# Patient Record
Sex: FEMALE | Race: WHITE | Employment: UNEMPLOYED | URBAN - METROPOLITAN AREA
[De-identification: names, ages, dates, MRNs, and addresses within clinical notes are randomized per-mention and may not be internally consistent; named-entity substitution may affect disease eponyms.]

---

## 2017-02-18 LAB
HEPATITIS B SURFACE ANTIGEN INTERPRETATION: NORMAL
HEPATITIS C ANTIBODY INTERPRETATION: NORMAL
RPR: NORMAL

## 2017-02-20 LAB — HIV-1 AND HIV-2 ANTIBODIES: NORMAL

## 2017-05-12 ENCOUNTER — OFFICE VISIT (OUTPATIENT)
Dept: FAMILY MEDICINE CLINIC | Age: 27
End: 2017-05-12

## 2017-05-12 VITALS
SYSTOLIC BLOOD PRESSURE: 112 MMHG | HEART RATE: 72 BPM | WEIGHT: 166 LBS | RESPIRATION RATE: 18 BRPM | DIASTOLIC BLOOD PRESSURE: 66 MMHG | TEMPERATURE: 98 F | BODY MASS INDEX: 28.34 KG/M2 | HEIGHT: 64 IN

## 2017-05-12 DIAGNOSIS — K21.9 GASTROESOPHAGEAL REFLUX DISEASE WITHOUT ESOPHAGITIS: ICD-10-CM

## 2017-05-12 DIAGNOSIS — R13.19 OTHER DYSPHAGIA: ICD-10-CM

## 2017-05-12 DIAGNOSIS — F32.89 ATYPICAL DEPRESSION: Primary | ICD-10-CM

## 2017-05-12 PROCEDURE — 99214 OFFICE O/P EST MOD 30 MIN: CPT | Performed by: FAMILY MEDICINE

## 2017-05-12 RX ORDER — QUETIAPINE 200 MG/1
200 TABLET, FILM COATED, EXTENDED RELEASE ORAL DAILY
Qty: 30 TABLET | Refills: 0 | Status: SHIPPED | OUTPATIENT
Start: 2017-05-12 | End: 2017-06-16

## 2017-06-16 DIAGNOSIS — F32.89 ATYPICAL DEPRESSION: ICD-10-CM

## 2017-06-16 RX ORDER — QUETIAPINE 150 MG/1
150 TABLET, FILM COATED, EXTENDED RELEASE ORAL DAILY
Qty: 30 TABLET | Refills: 5 | Status: SHIPPED | OUTPATIENT
Start: 2017-06-16 | End: 2017-09-15 | Stop reason: SDUPTHER

## 2017-06-16 RX ORDER — OMEPRAZOLE 20 MG/1
20 CAPSULE, DELAYED RELEASE ORAL DAILY
Qty: 30 CAPSULE | Refills: 5 | Status: SHIPPED | OUTPATIENT
Start: 2017-06-16 | End: 2018-01-19 | Stop reason: SDUPTHER

## 2017-09-15 ENCOUNTER — OFFICE VISIT (OUTPATIENT)
Dept: FAMILY MEDICINE CLINIC | Age: 27
End: 2017-09-15

## 2017-09-15 VITALS
HEART RATE: 90 BPM | RESPIRATION RATE: 16 BRPM | SYSTOLIC BLOOD PRESSURE: 114 MMHG | HEIGHT: 64 IN | BODY MASS INDEX: 29.53 KG/M2 | TEMPERATURE: 98.6 F | DIASTOLIC BLOOD PRESSURE: 76 MMHG | WEIGHT: 173 LBS

## 2017-09-15 DIAGNOSIS — F32.89 ATYPICAL DEPRESSION: ICD-10-CM

## 2017-09-15 DIAGNOSIS — R73.9 HYPERGLYCEMIA: ICD-10-CM

## 2017-09-15 DIAGNOSIS — R53.83 MALAISE AND FATIGUE: ICD-10-CM

## 2017-09-15 DIAGNOSIS — B36.0 TINEA VERSICOLOR: ICD-10-CM

## 2017-09-15 DIAGNOSIS — F32.A DEPRESSION, UNSPECIFIED DEPRESSION TYPE: ICD-10-CM

## 2017-09-15 DIAGNOSIS — R53.81 MALAISE AND FATIGUE: ICD-10-CM

## 2017-09-15 DIAGNOSIS — G47.10 HYPERSOMNIA: Primary | ICD-10-CM

## 2017-09-15 LAB
BASOPHILS ABSOLUTE: 0.1 K/UL (ref 0–0.2)
BASOPHILS RELATIVE PERCENT: 0.7 %
EOSINOPHILS ABSOLUTE: 0.2 K/UL (ref 0–0.6)
EOSINOPHILS RELATIVE PERCENT: 3 %
HCT VFR BLD CALC: 44.4 % (ref 36–48)
HEMOGLOBIN: 15 G/DL (ref 12–16)
LYMPHOCYTES ABSOLUTE: 2.2 K/UL (ref 1–5.1)
LYMPHOCYTES RELATIVE PERCENT: 32.6 %
MCH RBC QN AUTO: 30.1 PG (ref 26–34)
MCHC RBC AUTO-ENTMCNC: 33.9 G/DL (ref 31–36)
MCV RBC AUTO: 88.9 FL (ref 80–100)
MONOCYTES ABSOLUTE: 0.5 K/UL (ref 0–1.3)
MONOCYTES RELATIVE PERCENT: 7.6 %
NEUTROPHILS ABSOLUTE: 3.8 K/UL (ref 1.7–7.7)
NEUTROPHILS RELATIVE PERCENT: 56.1 %
PDW BLD-RTO: 13.7 % (ref 12.4–15.4)
PLATELET # BLD: 231 K/UL (ref 135–450)
PMV BLD AUTO: 9.5 FL (ref 5–10.5)
RBC # BLD: 4.99 M/UL (ref 4–5.2)
TSH SERPL DL<=0.05 MIU/L-ACNC: 7.2 UIU/ML (ref 0.27–4.2)
WBC # BLD: 6.8 K/UL (ref 4–11)

## 2017-09-15 PROCEDURE — 99214 OFFICE O/P EST MOD 30 MIN: CPT | Performed by: FAMILY MEDICINE

## 2017-09-15 RX ORDER — KETOCONAZOLE 20 MG/ML
SHAMPOO TOPICAL
Qty: 2 BOTTLE | Refills: 3 | Status: ON HOLD | OUTPATIENT
Start: 2017-09-15 | End: 2018-04-05 | Stop reason: ALTCHOICE

## 2017-09-15 RX ORDER — QUETIAPINE 150 MG/1
150 TABLET, FILM COATED, EXTENDED RELEASE ORAL DAILY
Qty: 30 TABLET | Refills: 5 | Status: SHIPPED | OUTPATIENT
Start: 2017-09-15 | End: 2018-01-19 | Stop reason: SDUPTHER

## 2017-09-15 ASSESSMENT — PATIENT HEALTH QUESTIONNAIRE - PHQ9
SUM OF ALL RESPONSES TO PHQ QUESTIONS 1-9: 0
1. LITTLE INTEREST OR PLEASURE IN DOING THINGS: 0
SUM OF ALL RESPONSES TO PHQ9 QUESTIONS 1 & 2: 0
2. FEELING DOWN, DEPRESSED OR HOPELESS: 0

## 2017-09-16 LAB
ESTIMATED AVERAGE GLUCOSE: 108.3 MG/DL
HBA1C MFR BLD: 5.4 %

## 2017-09-18 DIAGNOSIS — E03.9 ACQUIRED HYPOTHYROIDISM: Primary | ICD-10-CM

## 2017-09-18 RX ORDER — LEVOTHYROXINE SODIUM 0.05 MG/1
50 TABLET ORAL DAILY
Qty: 30 TABLET | Refills: 3 | Status: SHIPPED | OUTPATIENT
Start: 2017-09-18 | End: 2018-01-19 | Stop reason: SDUPTHER

## 2017-12-07 LAB
ABO/RH: NORMAL
ANTIBODY SCREEN: NORMAL
HEPATITIS C ANTIBODY INTERPRETATION: NORMAL

## 2017-12-08 LAB
BASOPHILS ABSOLUTE: 0 K/UL (ref 0–0.2)
BASOPHILS RELATIVE PERCENT: 0.5 %
EOSINOPHILS ABSOLUTE: 0.2 K/UL (ref 0–0.6)
EOSINOPHILS RELATIVE PERCENT: 1.7 %
HCT VFR BLD CALC: 41.3 % (ref 36–48)
HEMOGLOBIN: 13.5 G/DL (ref 12–16)
HEPATITIS B SURFACE ANTIGEN INTERPRETATION: NORMAL
HIV-1 AND HIV-2 ANTIBODIES: NORMAL
LYMPHOCYTES ABSOLUTE: 2 K/UL (ref 1–5.1)
LYMPHOCYTES RELATIVE PERCENT: 22.7 %
MCH RBC QN AUTO: 29.3 PG (ref 26–34)
MCHC RBC AUTO-ENTMCNC: 32.6 G/DL (ref 31–36)
MCV RBC AUTO: 89.9 FL (ref 80–100)
MONOCYTES ABSOLUTE: 0.8 K/UL (ref 0–1.3)
MONOCYTES RELATIVE PERCENT: 8.8 %
NEUTROPHILS ABSOLUTE: 5.8 K/UL (ref 1.7–7.7)
NEUTROPHILS RELATIVE PERCENT: 66.3 %
PDW BLD-RTO: 13.4 % (ref 12.4–15.4)
PLATELET # BLD: 258 K/UL (ref 135–450)
PMV BLD AUTO: 9.2 FL (ref 5–10.5)
RBC # BLD: 4.59 M/UL (ref 4–5.2)
RPR: NORMAL
RUBELLA ANTIBODY IGG: 107.3 IU/ML
WBC # BLD: 8.8 K/UL (ref 4–11)

## 2018-01-19 ENCOUNTER — OFFICE VISIT (OUTPATIENT)
Dept: FAMILY MEDICINE CLINIC | Age: 28
End: 2018-01-19

## 2018-01-19 VITALS
WEIGHT: 180 LBS | RESPIRATION RATE: 16 BRPM | SYSTOLIC BLOOD PRESSURE: 116 MMHG | TEMPERATURE: 98.3 F | HEART RATE: 104 BPM | BODY MASS INDEX: 28.93 KG/M2 | HEIGHT: 66 IN | DIASTOLIC BLOOD PRESSURE: 80 MMHG

## 2018-01-19 DIAGNOSIS — F32.89 ATYPICAL DEPRESSION: ICD-10-CM

## 2018-01-19 DIAGNOSIS — E03.9 ACQUIRED HYPOTHYROIDISM: ICD-10-CM

## 2018-01-19 DIAGNOSIS — Z33.1 PREGNANCY, INCIDENTAL: Primary | ICD-10-CM

## 2018-01-19 LAB
T4 FREE: 0.9 NG/DL (ref 0.9–1.8)
TSH SERPL DL<=0.05 MIU/L-ACNC: 0.48 UIU/ML (ref 0.27–4.2)

## 2018-01-19 PROCEDURE — G8419 CALC BMI OUT NRM PARAM NOF/U: HCPCS | Performed by: FAMILY MEDICINE

## 2018-01-19 PROCEDURE — 99214 OFFICE O/P EST MOD 30 MIN: CPT | Performed by: FAMILY MEDICINE

## 2018-01-19 PROCEDURE — 1036F TOBACCO NON-USER: CPT | Performed by: FAMILY MEDICINE

## 2018-01-19 PROCEDURE — G8482 FLU IMMUNIZE ORDER/ADMIN: HCPCS | Performed by: FAMILY MEDICINE

## 2018-01-19 PROCEDURE — G8427 DOCREV CUR MEDS BY ELIG CLIN: HCPCS | Performed by: FAMILY MEDICINE

## 2018-01-19 RX ORDER — LEVOTHYROXINE SODIUM 0.05 MG/1
50 TABLET ORAL DAILY
Qty: 90 TABLET | Refills: 1 | Status: SHIPPED | OUTPATIENT
Start: 2018-01-19 | End: 2018-07-25 | Stop reason: SDUPTHER

## 2018-01-19 RX ORDER — QUETIAPINE 150 MG/1
150 TABLET, FILM COATED, EXTENDED RELEASE ORAL DAILY
Qty: 30 TABLET | Refills: 5 | Status: SHIPPED | OUTPATIENT
Start: 2018-01-19 | End: 2018-05-02

## 2018-01-19 RX ORDER — OMEPRAZOLE 20 MG/1
20 CAPSULE, DELAYED RELEASE ORAL DAILY
Qty: 30 CAPSULE | Refills: 5 | Status: SHIPPED | OUTPATIENT
Start: 2018-01-19 | End: 2018-05-02

## 2018-01-19 NOTE — PROGRESS NOTES
Subjective:     Chief Complaint   Patient presents with    Check-Up     4 month check up       Glenn Salazar is a 32 y.o. female who presents for follow-up of thyroid, 10 weeks EGA, vomiting getting better, been to ER with dehydration    Review of systems:  Patient denies, chest pain, shortness of breath or significant leg swelling. Patient's medications, allergies, past medical, surgical, social and family histories were reviewed and updated as appropriate. Current Outpatient Prescriptions on File Prior to Visit   Medication Sig Dispense Refill    acetaminophen (APAP EXTRA STRENGTH) 500 MG tablet Take 2 tablets by mouth every 6 hours as needed for Pain DO NOT TAKE WITH OTHER MEDICATIONS CONTAINING ACETAMINOPHEN. 30 tablet 0    levothyroxine (SYNTHROID) 50 MCG tablet Take 1 tablet by mouth Daily 30 tablet 3    ketoconazole (NIZORAL) 2 % shampoo Apply to body, leave on about 15 minutes. May repeat in every 2 weeks. 2 Bottle 3    QUEtiapine (SEROQUEL XR) 150 MG TB24 extended release tablet Take 1 tablet by mouth daily 30 tablet 5    omeprazole (PRILOSEC) 20 MG delayed release capsule Take 1 capsule by mouth daily 30 capsule 5    albuterol sulfate HFA (VENTOLIN HFA) 108 (90 BASE) MCG/ACT inhaler Inhale 2 puffs into the lungs daily as needed for Wheezing or Shortness of Breath 1 Inhaler 3     No current facility-administered medications on file prior to visit. Allergies   Allergen Reactions    Latex Rash    Fluoxetine Nausea Only    Percocet [Oxycodone-Acetaminophen] Nausea Only    Tramadol Other (See Comments)     Hallucinations.  Wellbutrin [Bupropion Hcl] Nausea Only    Zoloft [Sertraline Hcl]      overstim    Shellfish-Derived Products Swelling and Rash     Past Medical History:   Diagnosis Date    Abnormal Pap smear     Colposcopy; HPV    ADHD (attention deficit hyperactivity disorder)     no meds currently.     Anxiety     Currently taking Fluoxitine    Asthma     Daily asthma attacks recently w/pregnancy;  uses Albuterol inhaler QID with pregnancy.  Back pain, chronic     Diabetes mellitus (HCC)     GDM on glyburide    Endometriosis     Febrile convulsions (simple), unspecified     Febrile seizure as an infant one time    Generalized anxiety disorder     was taking prozac early in pregnancy.  History of chicken pox 87    Irritable bowel     Positive PPD, treated     negative chest xray.     Pre-eclampsia in third trimester 2012     Past Surgical History:   Procedure Laterality Date    BREAST SURGERY      reduction     SECTION       Family History   Problem Relation Age of Onset    Mental Illness Father      bipolar    Heart Disease Father 43     MI x 3    Cancer Sister      cervical    Heart Failure Maternal Grandmother     Asthma Maternal Grandmother      Health Maintenance   Topic Date Due    TSH testing  09/15/2018    Cervical cancer screen  2020    DTaP/Tdap/Td vaccine (7 - Td) 12/10/2022    Flu vaccine  Completed    Pneumococcal med risk  Completed    HIV screen  Completed     TSH (uIU/mL)   Date Value   09/15/2017 7.20 (H)     Cholesterol, Total   Date Value Ref Range Status   2015 148 0 - 199 mg/dL Final     LDL Calculated   Date Value Ref Range Status   2015 93 <100 mg/dL Final     HDL   Date Value Ref Range Status   2015 41 40 - 60 mg/dL Final     Triglycerides   Date Value Ref Range Status   2015 69 0 - 150 mg/dL Final     Lab Results   Component Value Date    GLUCOSE 95 2018     Lab Results   Component Value Date     2018    K 3.8 2018    CREATININE <0.5 (L) 2018     Lab Results   Component Value Date    WBC 12.0 (H) 2018    HGB 13.3 2018    HCT 39.3 2018    MCV 86.5 2018     2018     Lab Results   Component Value Date    ALT 21 2018    AST 14 (L) 2018    ALKPHOS 56 2018    BILITOT 0.4 2018     Lab Results

## 2018-01-30 ENCOUNTER — HOSPITAL ENCOUNTER (OUTPATIENT)
Dept: OTHER | Age: 28
Discharge: HOME | End: 2018-01-30
Attending: OBSTETRICS & GYNECOLOGY | Admitting: OBSTETRICS & GYNECOLOGY

## 2018-01-30 LAB
A/G RATIO: 1.4 (ref 1.1–2.2)
ALBUMIN SERPL-MCNC: 3.8 G/DL (ref 3.4–5)
ALP BLD-CCNC: 47 U/L (ref 40–129)
ALT SERPL-CCNC: 11 U/L (ref 10–40)
ANION GAP SERPL CALCULATED.3IONS-SCNC: 13 MMOL/L (ref 3–16)
AST SERPL-CCNC: 13 U/L (ref 15–37)
BILIRUB SERPL-MCNC: <0.2 MG/DL (ref 0–1)
BUN BLDV-MCNC: 7 MG/DL (ref 7–20)
CALCIUM SERPL-MCNC: 8.7 MG/DL (ref 8.3–10.6)
CHLORIDE BLD-SCNC: 97 MMOL/L (ref 99–110)
CO2: 24 MMOL/L (ref 21–32)
CREAT SERPL-MCNC: <0.5 MG/DL (ref 0.6–1.1)
GFR AFRICAN AMERICAN: >60
GFR NON-AFRICAN AMERICAN: >60
GLOBULIN: 2.8 G/DL
GLUCOSE BLD-MCNC: 188 MG/DL (ref 70–99)
POTASSIUM SERPL-SCNC: 3.8 MMOL/L (ref 3.5–5.1)
PROTEIN 24 HOUR URINE: 0.13 G/24HR
SODIUM BLD-SCNC: 134 MMOL/L (ref 136–145)
TOTAL PROTEIN: 6.6 G/DL (ref 6.4–8.2)
URIC ACID, SERUM: 2.7 MG/DL (ref 2.6–6)

## 2018-01-31 LAB
24HR URINE VOLUME (ML): 2175 ML
CREAT SERPL-MCNC: <0.5 MG/DL (ref 0.6–1.2)
CREATININE 24 HOUR URINE: 1.5 G/24HR (ref 0.6–1.5)
CREATININE CLEARANCE: 187 ML/MIN (ref 88–128)
Lab: 24 HR

## 2018-02-23 ENCOUNTER — TELEPHONE (OUTPATIENT)
Dept: FAMILY MEDICINE CLINIC | Age: 28
End: 2018-02-23

## 2018-02-23 NOTE — TELEPHONE ENCOUNTER
Pt called and wanted to know how long the whooping cough vaccine is good for. She is currently pregnant and her ob said they are good for life and she also heard they wear good for 5 years and she just wants to know so if her family members need them they can get one.

## 2018-03-05 ENCOUNTER — HOSPITAL ENCOUNTER (OUTPATIENT)
Dept: OTHER | Age: 28
Discharge: HOME | End: 2018-03-05
Attending: OBSTETRICS & GYNECOLOGY | Admitting: OBSTETRICS & GYNECOLOGY

## 2018-03-05 LAB
GLUCOSE FASTING: 98 MG/DL
GLUCOSE TOLERANCE TEST 1 HOUR: 176 MG/DL
GLUCOSE TOLERANCE TEST 2 HOUR: 163 MG/DL
GLUCOSE TOLERANCE TEST 3 HOUR: 103 MG/DL

## 2018-03-27 ENCOUNTER — HOSPITAL ENCOUNTER (OUTPATIENT)
Dept: DIABETES SERVICES | Age: 28
Discharge: HOME | End: 2018-03-27
Attending: OBSTETRICS & GYNECOLOGY | Admitting: OBSTETRICS & GYNECOLOGY

## 2018-03-27 DIAGNOSIS — O99.810 ABNORMAL GLUCOSE COMPLICATING PREGNANCY: ICD-10-CM

## 2018-03-27 ASSESSMENT — PATIENT HEALTH QUESTIONNAIRE - PHQ9
SUM OF ALL RESPONSES TO PHQ9 QUESTIONS 1 & 2: 0
SUM OF ALL RESPONSES TO PHQ QUESTIONS 1-9: 0
1. LITTLE INTEREST OR PLEASURE IN DOING THINGS: 0

## 2018-04-05 PROBLEM — M54.9 BACK PAIN: Status: ACTIVE | Noted: 2018-04-05

## 2018-04-17 LAB
A/G RATIO: 1.6 (ref 1.1–2.2)
ALBUMIN SERPL-MCNC: 3.6 G/DL (ref 3.4–5)
ALP BLD-CCNC: 62 U/L (ref 40–129)
ALT SERPL-CCNC: 23 U/L (ref 10–40)
AMYLASE: 43 U/L (ref 25–115)
ANION GAP SERPL CALCULATED.3IONS-SCNC: 15 MMOL/L (ref 3–16)
AST SERPL-CCNC: 16 U/L (ref 15–37)
BILIRUB SERPL-MCNC: <0.2 MG/DL (ref 0–1)
BUN BLDV-MCNC: 7 MG/DL (ref 7–20)
CALCIUM SERPL-MCNC: 8.5 MG/DL (ref 8.3–10.6)
CHLORIDE BLD-SCNC: 101 MMOL/L (ref 99–110)
CO2: 23 MMOL/L (ref 21–32)
CREAT SERPL-MCNC: <0.5 MG/DL (ref 0.6–1.1)
GFR AFRICAN AMERICAN: >60
GFR NON-AFRICAN AMERICAN: >60
GLOBULIN: 2.3 G/DL
GLUCOSE BLD-MCNC: 136 MG/DL (ref 70–99)
LIPASE: 28 U/L (ref 13–60)
POTASSIUM SERPL-SCNC: 4.2 MMOL/L (ref 3.5–5.1)
SODIUM BLD-SCNC: 139 MMOL/L (ref 136–145)
TOTAL PROTEIN: 5.9 G/DL (ref 6.4–8.2)

## 2018-04-23 ENCOUNTER — HOSPITAL ENCOUNTER (OUTPATIENT)
Dept: ULTRASOUND IMAGING | Age: 28
Discharge: HOME | End: 2018-04-23
Attending: OBSTETRICS & GYNECOLOGY | Admitting: OBSTETRICS & GYNECOLOGY

## 2018-04-23 DIAGNOSIS — R10.11 RIGHT UPPER QUADRANT PAIN: ICD-10-CM

## 2018-05-02 ENCOUNTER — OFFICE VISIT (OUTPATIENT)
Dept: FAMILY MEDICINE CLINIC | Age: 28
End: 2018-05-02

## 2018-05-02 VITALS
HEART RATE: 106 BPM | WEIGHT: 203 LBS | HEIGHT: 66 IN | SYSTOLIC BLOOD PRESSURE: 116 MMHG | DIASTOLIC BLOOD PRESSURE: 78 MMHG | BODY MASS INDEX: 32.62 KG/M2 | RESPIRATION RATE: 16 BRPM | TEMPERATURE: 98.7 F

## 2018-05-02 DIAGNOSIS — E03.9 ACQUIRED HYPOTHYROIDISM: Primary | ICD-10-CM

## 2018-05-02 DIAGNOSIS — E03.9 ACQUIRED HYPOTHYROIDISM: ICD-10-CM

## 2018-05-02 DIAGNOSIS — J45.40 MODERATE PERSISTENT ASTHMA WITHOUT COMPLICATION: ICD-10-CM

## 2018-05-02 DIAGNOSIS — F32.89 ATYPICAL DEPRESSION: ICD-10-CM

## 2018-05-02 DIAGNOSIS — Z33.1 PREGNANCY, INCIDENTAL: ICD-10-CM

## 2018-05-02 LAB
T3 TOTAL: 1.82 NG/ML (ref 0.8–2)
T4 FREE: 1 NG/DL (ref 0.9–1.8)
TSH SERPL DL<=0.05 MIU/L-ACNC: 0.72 UIU/ML (ref 0.27–4.2)

## 2018-05-02 PROCEDURE — G8417 CALC BMI ABV UP PARAM F/U: HCPCS | Performed by: FAMILY MEDICINE

## 2018-05-02 PROCEDURE — 99214 OFFICE O/P EST MOD 30 MIN: CPT | Performed by: FAMILY MEDICINE

## 2018-05-02 PROCEDURE — 1036F TOBACCO NON-USER: CPT | Performed by: FAMILY MEDICINE

## 2018-05-02 PROCEDURE — G8427 DOCREV CUR MEDS BY ELIG CLIN: HCPCS | Performed by: FAMILY MEDICINE

## 2018-05-02 RX ORDER — QUETIAPINE FUMARATE 50 MG/1
TABLET, EXTENDED RELEASE ORAL
Qty: 21 TABLET | Refills: 0 | Status: SHIPPED | OUTPATIENT
Start: 2018-05-02 | End: 2018-06-13

## 2018-05-02 RX ORDER — NEBULIZER ACCESSORIES
1 KIT MISCELLANEOUS DAILY PRN
Qty: 1 KIT | Refills: 0 | Status: SHIPPED | OUTPATIENT
Start: 2018-05-02 | End: 2018-12-23

## 2018-05-03 ENCOUNTER — TELEPHONE (OUTPATIENT)
Dept: FAMILY MEDICINE CLINIC | Age: 28
End: 2018-05-03

## 2018-05-03 DIAGNOSIS — J45.20 ASTHMA, MILD INTERMITTENT, WELL-CONTROLLED: Primary | ICD-10-CM

## 2018-05-04 RX ORDER — ALBUTEROL SULFATE 2.5 MG/3ML
2.5 SOLUTION RESPIRATORY (INHALATION) EVERY 6 HOURS PRN
Qty: 50 VIAL | Refills: 3 | Status: SHIPPED | OUTPATIENT
Start: 2018-05-04 | End: 2018-12-23 | Stop reason: ALTCHOICE

## 2018-06-09 PROBLEM — R10.9 ABDOMINAL PAIN: Status: ACTIVE | Noted: 2018-06-09

## 2018-06-13 ENCOUNTER — OFFICE VISIT (OUTPATIENT)
Dept: FAMILY MEDICINE CLINIC | Age: 28
End: 2018-06-13

## 2018-06-13 VITALS
OXYGEN SATURATION: 98 % | SYSTOLIC BLOOD PRESSURE: 116 MMHG | WEIGHT: 204 LBS | RESPIRATION RATE: 16 BRPM | HEART RATE: 104 BPM | HEIGHT: 66 IN | DIASTOLIC BLOOD PRESSURE: 74 MMHG | BODY MASS INDEX: 32.78 KG/M2

## 2018-06-13 DIAGNOSIS — O24.414 INSULIN CONTROLLED GESTATIONAL DIABETES MELLITUS (GDM) IN SECOND TRIMESTER: ICD-10-CM

## 2018-06-13 DIAGNOSIS — F32.A DEPRESSION, UNSPECIFIED DEPRESSION TYPE: Primary | ICD-10-CM

## 2018-06-13 DIAGNOSIS — Z33.1 PREGNANCY, INCIDENTAL: ICD-10-CM

## 2018-06-13 DIAGNOSIS — F51.01 PRIMARY INSOMNIA: ICD-10-CM

## 2018-06-13 PROCEDURE — 1036F TOBACCO NON-USER: CPT | Performed by: FAMILY MEDICINE

## 2018-06-13 PROCEDURE — 99213 OFFICE O/P EST LOW 20 MIN: CPT | Performed by: FAMILY MEDICINE

## 2018-06-13 PROCEDURE — G8427 DOCREV CUR MEDS BY ELIG CLIN: HCPCS | Performed by: FAMILY MEDICINE

## 2018-06-13 PROCEDURE — G8417 CALC BMI ABV UP PARAM F/U: HCPCS | Performed by: FAMILY MEDICINE

## 2018-06-13 RX ORDER — INSULIN HUMAN 100 [IU]/ML
INJECTION, SUSPENSION SUBCUTANEOUS
COMMUNITY
Start: 2018-05-16 | End: 2018-10-10

## 2018-06-13 RX ORDER — BLOOD-GLUCOSE METER
KIT MISCELLANEOUS
COMMUNITY
Start: 2018-06-06 | End: 2018-10-10

## 2018-06-13 RX ORDER — INSULIN LISPRO 100 [IU]/ML
INJECTION, SOLUTION INTRAVENOUS; SUBCUTANEOUS
COMMUNITY
Start: 2018-05-16 | End: 2018-10-10

## 2018-06-29 DIAGNOSIS — J45.20 ASTHMA, MILD INTERMITTENT, WELL-CONTROLLED: ICD-10-CM

## 2018-06-29 RX ORDER — ALBUTEROL SULFATE 90 UG/1
2 AEROSOL, METERED RESPIRATORY (INHALATION) DAILY PRN
Qty: 1 INHALER | Refills: 3 | Status: SHIPPED | OUTPATIENT
Start: 2018-06-29 | End: 2019-04-18

## 2018-07-05 LAB — TSH SERPL DL<=0.05 MIU/L-ACNC: 0.91 UIU/ML (ref 0.27–4.2)

## 2018-07-16 ENCOUNTER — OFFICE VISIT (OUTPATIENT)
Dept: FAMILY MEDICINE CLINIC | Age: 28
End: 2018-07-16

## 2018-07-16 VITALS
SYSTOLIC BLOOD PRESSURE: 122 MMHG | DIASTOLIC BLOOD PRESSURE: 84 MMHG | OXYGEN SATURATION: 99 % | RESPIRATION RATE: 18 BRPM | BODY MASS INDEX: 34.39 KG/M2 | HEIGHT: 66 IN | WEIGHT: 214 LBS | HEART RATE: 116 BPM | TEMPERATURE: 99.1 F

## 2018-07-16 DIAGNOSIS — O24.414 INSULIN CONTROLLED GESTATIONAL DIABETES MELLITUS (GDM) IN SECOND TRIMESTER: ICD-10-CM

## 2018-07-16 DIAGNOSIS — Z23 NEED FOR TETANUS BOOSTER: ICD-10-CM

## 2018-07-16 DIAGNOSIS — F32.A DEPRESSION, UNSPECIFIED DEPRESSION TYPE: Primary | ICD-10-CM

## 2018-07-16 DIAGNOSIS — F41.9 ANXIETY: ICD-10-CM

## 2018-07-16 DIAGNOSIS — J45.40 MODERATE PERSISTENT ASTHMA WITHOUT COMPLICATION: ICD-10-CM

## 2018-07-16 PROBLEM — M54.9 BACK PAIN: Status: RESOLVED | Noted: 2018-04-05 | Resolved: 2018-07-16

## 2018-07-16 PROBLEM — R10.9 ABDOMINAL PAIN: Status: RESOLVED | Noted: 2018-06-09 | Resolved: 2018-07-16

## 2018-07-16 PROCEDURE — 99214 OFFICE O/P EST MOD 30 MIN: CPT | Performed by: FAMILY MEDICINE

## 2018-07-16 PROCEDURE — G8417 CALC BMI ABV UP PARAM F/U: HCPCS | Performed by: FAMILY MEDICINE

## 2018-07-16 PROCEDURE — 1036F TOBACCO NON-USER: CPT | Performed by: FAMILY MEDICINE

## 2018-07-16 PROCEDURE — G8427 DOCREV CUR MEDS BY ELIG CLIN: HCPCS | Performed by: FAMILY MEDICINE

## 2018-07-16 NOTE — PROGRESS NOTES
Scribe documentation   I, Fabrizio Howell , am scribing for Chely York MD. Electronically signed by Fabrizio Howell  on 7/16/2018 at 1:45 PM    CHART REVIEW  Health Maintenance   Topic Date Due    Flu vaccine (1) 09/01/2018    TSH testing  07/05/2019    Cervical cancer screen  02/17/2020    DTaP/Tdap/Td vaccine (7 - Td) 12/10/2022    Pneumococcal med risk  Completed    HIV screen  Completed     Social History     Social History    Marital status: Single     Spouse name: N/A    Number of children: N/A    Years of education: N/A     Social History Main Topics    Smoking status: Never Smoker    Smokeless tobacco: Never Used      Comment: congratulated on nonsmoking status.  Alcohol use No    Drug use: No    Sexual activity: Yes     Partners: Male     Birth control/ protection: IUD     Other Topics Concern    None     Social History Narrative    Exercise: walks 7 time(s) per week    Seatbelt use: Always    Sexual activity: single partner, contraception - IUD           Prior to Visit Medications    Medication Sig Taking?  Authorizing Provider   albuterol sulfate HFA (VENTOLIN HFA) 108 (90 Base) MCG/ACT inhaler Inhale 2 puffs into the lungs daily as needed for Wheezing or Shortness of Breath Yes Saurabh Crystal MD   FREESTYLE LITE strip  Yes Historical Provider, MD   HUMALOG KWIKPEN 100 UNIT/ML pen  Yes Historical Provider, MD   HUMULIN N KWIKPEN 100 UNIT/ML injection pen  Yes Historical Provider, MD   melatonin 3 MG TABS tablet Take 3 mg by mouth daily Yes Historical Provider, MD   albuterol (PROVENTIL) (2.5 MG/3ML) 0.083% nebulizer solution Take 3 mLs by nebulization every 6 hours as needed for Wheezing Yes Saurabh Crystal MD   mometasone (ASMANEX 30 METERED DOSES) 220 MCG/INH inhaler Inhale 1 puff into the lungs daily Yes Saurabh Crystal MD   Respiratory Therapy Supplies (NEBULIZER/TUBING/MOUTHPIECE) KIT 1 kit by Does not apply route daily as needed (wheeze) Machine and supplies Yes Saurabh Crystal MD   Prenatal Vit-Fe Fumarate-FA (PRENATAL VITAMIN) 27-0.8 MG TABS Take 1 tablet by mouth daily Yes Historical Provider, MD   levothyroxine (SYNTHROID) 50 MCG tablet Take 1 tablet by mouth Daily Yes Magaly Sy MD      Patient Active Problem List   Diagnosis    GERD (gastroesophageal reflux disease)    Contraceptive management    Irritable bowel    Depression    Insomnia    Back pain, chronic    ADHD (attention deficit hyperactivity disorder)    Allergic rhinitis    Needs flu shot    Dysmenorrhea    Menorrhagia    PMS (premenstrual syndrome)    Anxiety    Tinea versicolor    Atypical depression    Herpes stomatitis    Acquired hypothyroidism    Pregnancy, incidental    Moderate persistent asthma without complication    Insulin controlled gestational diabetes mellitus (GDM) in second trimester      Cholesterol, Total   Date Value Ref Range Status   08/06/2015 148 0 - 199 mg/dL Final     LDL Calculated   Date Value Ref Range Status   08/06/2015 93 <100 mg/dL Final     HDL   Date Value Ref Range Status   08/06/2015 41 40 - 60 mg/dL Final     Triglycerides   Date Value Ref Range Status   08/06/2015 69 0 - 150 mg/dL Final     Lab Results   Component Value Date    GLUCOSE 136 (H) 04/16/2018     Lab Results   Component Value Date     04/16/2018    K 4.2 04/16/2018    CREATININE <0.5 (L) 04/16/2018     Lab Results   Component Value Date    WBC 9.8 05/15/2018    HGB 12.8 05/15/2018    HCT 37.8 05/15/2018    MCV 87.1 05/15/2018     05/15/2018     Lab Results   Component Value Date    ALT 23 04/16/2018    AST 16 04/16/2018    ALKPHOS 62 04/16/2018    BILITOT <0.2 04/16/2018     TSH (uIU/mL)   Date Value   07/05/2018 0.91     Lab Results   Component Value Date    LABA1C 5.4 09/15/2017     VISIT NOTE  Subjective:     Chief Complaint   Patient presents with    Depression     1 mon f/u      Norberto Girard is a 25 y.o. female who presents for follow up of mood issue.    occ panic, uses distraction and breathing  Sleeps 4 h (pregnant)  EGA 37 weeks  On melatonin and insulin    HISTORY  Are you working with a psychologist / psychiatrist?  No  Have you felt your symptoms are better, worse, or unchanged since your last visit   unchanged  Mood is fair. Sleep is good. Stressors: custody issues  Current symptoms include: fatigue, worry more than necessary, irritable. Patient denies depression symptoms of: suicidal intention  Patient denies anxiety symptoms of: losing control. Review of Systems  Pertinent items are noted in HPI. Patient's medications, allergies, past medical, surgical, social and family histories were reviewed and updated as appropriate. Objective:   PHYSICAL EXAM  /84 (Site: Right Arm, Position: Sitting, Cuff Size: Small Adult)   Pulse 116   Temp 99.1 °F (37.3 °C) (Oral)   Resp 18   Ht 5' 6\" (1.676 m)   Wt 214 lb (97.1 kg)   LMP 10/15/2017 (Approximate)   SpO2 99%   BMI 34.54 kg/m² Weight is increased. Blood pressure is Excellent. BP Readings from Last 3 Encounters:   07/16/18 122/84   06/13/18 116/74   06/09/18 (!) 101/55     Wt Readings from Last 3 Encounters:   07/16/18 214 lb (97.1 kg)   07/10/18 210 lb (95.3 kg)   06/13/18 204 lb (92.5 kg)     GENERAL: well-developed, well-nourished, alert, no distress, calm  PSYCH:  full facial expressions, good grooming, good insight, normal perception, normal reasoning and normal speech pattern and content and normal thought patterns    The time spent counseling patient was 10 minutes of 15 minute appointment. Reviewed concept of anxiety as biochemical imbalance of neurotransmitters and rationale for treatment. Instructed patient to contact office or on-call physician promptly should condition worsen or any new symptoms appear and provided on-call telephone numbers. IF THE PATIENT HAS ANY SUICIDAL OR HOMICIDAL IDEATIONS, CALL THE OFFICE, DISCUSS WITH A SUPPORT MEMBER, OR GO TO THE ER IMMEDIATELY.  Patient was agreeable with this

## 2018-07-16 NOTE — PROGRESS NOTES
MEDICAL STUDENT VISIT NOTE  CHART REVIEW  Health Maintenance   Topic Date Due    Flu vaccine (1) 09/01/2018    TSH testing  07/05/2019    Cervical cancer screen  02/17/2020    DTaP/Tdap/Td vaccine (7 - Td) 12/10/2022    Pneumococcal med risk  Completed    HIV screen  Completed     Social History     Social History    Marital status: Single     Spouse name: N/A    Number of children: N/A    Years of education: N/A     Social History Main Topics    Smoking status: Never Smoker    Smokeless tobacco: Never Used      Comment: congratulated on nonsmoking status.  Alcohol use No    Drug use: No    Sexual activity: Yes     Partners: Male     Birth control/ protection: IUD     Other Topics Concern    None     Social History Narrative    Exercise: walks 7 time(s) per week    Seatbelt use: Always    Sexual activity: single partner, contraception - IUD           Prior to Visit Medications    Medication Sig Taking?  Authorizing Provider   albuterol sulfate HFA (VENTOLIN HFA) 108 (90 Base) MCG/ACT inhaler Inhale 2 puffs into the lungs daily as needed for Wheezing or Shortness of Breath Yes Nakia Pham MD   FREESTYLE LITE strip  Yes Historical Provider, MD   HUMALOG KWIKPEN 100 UNIT/ML pen  Yes Historical Provider, MD   HUMULIN N KWIKPEN 100 UNIT/ML injection pen  Yes Historical Provider, MD   melatonin 3 MG TABS tablet Take 3 mg by mouth daily Yes Historical Provider, MD   albuterol (PROVENTIL) (2.5 MG/3ML) 0.083% nebulizer solution Take 3 mLs by nebulization every 6 hours as needed for Wheezing Yes Nakia Pham MD   mometasone (ASMANEX 30 METERED DOSES) 220 MCG/INH inhaler Inhale 1 puff into the lungs daily Yes Nakia Pham MD   Respiratory Therapy Supplies (NEBULIZER/TUBING/MOUTHPIECE) KIT 1 kit by Does not apply route daily as needed (wheeze) Machine and supplies Yes Nakia Pham MD   Prenatal Vit-Fe Fumarate-FA (PRENATAL VITAMIN) 27-0.8 MG TABS Take 1 tablet by mouth daily Yes Historical Provider, MD levothyroxine (SYNTHROID) 50 MCG tablet Take 1 tablet by mouth Daily Yes Lior Harding MD      Patient Active Problem List   Diagnosis    GERD (gastroesophageal reflux disease)    Contraceptive management    Irritable bowel    Depression    Insomnia    Back pain, chronic    Asthma, mild intermittent, well-controlled    ADHD (attention deficit hyperactivity disorder)    Allergic rhinitis    Needs flu shot    Dysmenorrhea    Menorrhagia    PMS (premenstrual syndrome)    Anxiety    Tinea versicolor    Atypical depression    Herpes stomatitis    Acquired hypothyroidism    Back pain    Pregnancy, incidental    Moderate persistent asthma without complication    Abdominal pain    Insulin controlled gestational diabetes mellitus (GDM) in second trimester      Cholesterol, Total   Date Value Ref Range Status   08/06/2015 148 0 - 199 mg/dL Final     LDL Calculated   Date Value Ref Range Status   08/06/2015 93 <100 mg/dL Final     HDL   Date Value Ref Range Status   08/06/2015 41 40 - 60 mg/dL Final     Triglycerides   Date Value Ref Range Status   08/06/2015 69 0 - 150 mg/dL Final     Lab Results   Component Value Date    GLUCOSE 136 (H) 04/16/2018     Lab Results   Component Value Date     04/16/2018    K 4.2 04/16/2018    CREATININE <0.5 (L) 04/16/2018     Lab Results   Component Value Date    WBC 9.8 05/15/2018    HGB 12.8 05/15/2018    HCT 37.8 05/15/2018    MCV 87.1 05/15/2018     05/15/2018     Lab Results   Component Value Date    ALT 23 04/16/2018    AST 16 04/16/2018    ALKPHOS 62 04/16/2018    BILITOT <0.2 04/16/2018     TSH (uIU/mL)   Date Value   07/05/2018 0.91     Lab Results   Component Value Date    LABA1C 5.4 09/15/2017       Subjective:     Chief Complaint   Patient presents with    Depression     1 mon f/u      Guanako Dodson is a 25 y.o. female who presents for follow up of mood issue.      HISTORY  Are you working with a psychologist / psychiatrist?  No  Have you

## 2018-07-25 DIAGNOSIS — E03.9 ACQUIRED HYPOTHYROIDISM: ICD-10-CM

## 2018-07-26 RX ORDER — LEVOTHYROXINE SODIUM 0.05 MG/1
TABLET ORAL
Qty: 30 TABLET | Refills: 3 | Status: SHIPPED | OUTPATIENT
Start: 2018-07-26 | End: 2018-10-10 | Stop reason: SDUPTHER

## 2018-10-10 ENCOUNTER — OFFICE VISIT (OUTPATIENT)
Dept: FAMILY MEDICINE CLINIC | Age: 28
End: 2018-10-10
Payer: COMMERCIAL

## 2018-10-10 VITALS
HEIGHT: 66 IN | HEART RATE: 112 BPM | TEMPERATURE: 98.5 F | SYSTOLIC BLOOD PRESSURE: 116 MMHG | BODY MASS INDEX: 31.82 KG/M2 | RESPIRATION RATE: 16 BRPM | DIASTOLIC BLOOD PRESSURE: 70 MMHG | WEIGHT: 198 LBS

## 2018-10-10 DIAGNOSIS — K21.9 GASTROESOPHAGEAL REFLUX DISEASE WITHOUT ESOPHAGITIS: ICD-10-CM

## 2018-10-10 DIAGNOSIS — F33.42 RECURRENT MAJOR DEPRESSIVE DISORDER, IN FULL REMISSION (HCC): Primary | ICD-10-CM

## 2018-10-10 DIAGNOSIS — E03.9 ACQUIRED HYPOTHYROIDISM: ICD-10-CM

## 2018-10-10 DIAGNOSIS — F32.89 ATYPICAL DEPRESSION: ICD-10-CM

## 2018-10-10 DIAGNOSIS — F41.9 ANXIETY: ICD-10-CM

## 2018-10-10 LAB — TSH SERPL DL<=0.05 MIU/L-ACNC: 0.79 UIU/ML (ref 0.27–4.2)

## 2018-10-10 PROCEDURE — 1036F TOBACCO NON-USER: CPT | Performed by: FAMILY MEDICINE

## 2018-10-10 PROCEDURE — G8417 CALC BMI ABV UP PARAM F/U: HCPCS | Performed by: FAMILY MEDICINE

## 2018-10-10 PROCEDURE — 99213 OFFICE O/P EST LOW 20 MIN: CPT | Performed by: FAMILY MEDICINE

## 2018-10-10 PROCEDURE — G8484 FLU IMMUNIZE NO ADMIN: HCPCS | Performed by: FAMILY MEDICINE

## 2018-10-10 PROCEDURE — G8427 DOCREV CUR MEDS BY ELIG CLIN: HCPCS | Performed by: FAMILY MEDICINE

## 2018-10-10 RX ORDER — IBUPROFEN 800 MG/1
800 TABLET ORAL
COMMUNITY
Start: 2018-08-09 | End: 2018-10-10

## 2018-10-10 RX ORDER — LEVOTHYROXINE SODIUM 0.05 MG/1
TABLET ORAL
Qty: 90 TABLET | Refills: 1 | Status: SHIPPED | OUTPATIENT
Start: 2018-10-10 | End: 2019-01-15 | Stop reason: SDUPTHER

## 2018-10-10 RX ORDER — OXYCODONE HYDROCHLORIDE AND ACETAMINOPHEN 5; 325 MG/1; MG/1
1 TABLET ORAL
COMMUNITY
End: 2018-10-10

## 2018-10-10 RX ORDER — QUETIAPINE 150 MG/1
150 TABLET, FILM COATED, EXTENDED RELEASE ORAL DAILY
COMMUNITY
End: 2018-10-10 | Stop reason: SDUPTHER

## 2018-10-10 RX ORDER — QUETIAPINE 150 MG/1
150 TABLET, FILM COATED, EXTENDED RELEASE ORAL DAILY
Qty: 90 TABLET | Refills: 1 | Status: SHIPPED | OUTPATIENT
Start: 2018-10-10 | End: 2018-11-19

## 2018-10-10 RX ORDER — QUETIAPINE FUMARATE 50 MG/1
50 TABLET, FILM COATED ORAL
COMMUNITY
End: 2018-10-10

## 2018-10-10 NOTE — PROGRESS NOTES
Mood Visit  Scribe documentation: Idalmis Valente am scribing for Wendy Irwin MD. Electronically signed by Mary Bhatia  on 10/10/2018 at 2:37 PM.   MD Attestation: Carrie Arora,  personally performed the services described in this documentation, as scribed by the user listed above, and it is both accurate and complete. CHART REVIEW  Health Maintenance   Topic Date Due    Flu vaccine (1) 09/01/2018    TSH testing  07/05/2019    Cervical cancer screen  02/17/2020    DTaP/Tdap/Td vaccine (8 - Td) 08/11/2028    Pneumococcal med risk  Completed    HIV screen  Completed     Social History     Social History    Marital status: Single     Spouse name: N/A    Number of children: N/A    Years of education: N/A     Social History Main Topics    Smoking status: Never Smoker    Smokeless tobacco: Never Used      Comment: congratulated on nonsmoking status.  Alcohol use No    Drug use: No    Sexual activity: Yes     Partners: Male     Birth control/ protection: IUD     Other Topics Concern    None     Social History Narrative    Exercise: walks 7 time(s) per week    Seatbelt use: Always    Sexual activity: single partner, contraception - IUD           The ASCVD Risk score (Sara Jefferson. et al., 2013) failed to calculate for the following reasons: The 2013 ASCVD risk score is only valid for ages 36 to 78  Prior to Visit Medications    Medication Sig Taking?  Authorizing Provider   QUEtiapine (SEROQUEL XR) 150 MG TB24 extended release tablet Take 1 tablet by mouth daily Yes Raven Gutierres MD   levothyroxine (SYNTHROID) 50 MCG tablet TAKE ONE TABLET BY MOUTH DAILY Yes Raven Gutierres MD   albuterol sulfate HFA (VENTOLIN HFA) 108 (90 Base) MCG/ACT inhaler Inhale 2 puffs into the lungs daily as needed for Wheezing or Shortness of Breath Yes Raven Gutierres MD   albuterol (PROVENTIL) (2.5 MG/3ML) 0.083% nebulizer solution Take 3 mLs by nebulization every 6 hours as needed for Wheezing Yes Raven Gutierres MD

## 2018-10-15 ENCOUNTER — TELEPHONE (OUTPATIENT)
Dept: FAMILY MEDICINE CLINIC | Age: 28
End: 2018-10-15

## 2018-10-15 RX ORDER — OMEPRAZOLE 20 MG/1
CAPSULE, DELAYED RELEASE ORAL
Qty: 30 CAPSULE | Refills: 4 | Status: SHIPPED | OUTPATIENT
Start: 2018-10-15 | End: 2019-05-21 | Stop reason: SDUPTHER

## 2018-10-17 NOTE — TELEPHONE ENCOUNTER
Received PA for QUEtiapine Fumarate ER 150MG er tablets (Key: [id number]). Medication is a formulary medication and Prior Authorization is NOT required at this time. Please advise patient. Thank you.

## 2018-11-15 DIAGNOSIS — F32.89 ATYPICAL DEPRESSION: ICD-10-CM

## 2018-11-19 RX ORDER — QUETIAPINE 150 MG/1
TABLET, FILM COATED, EXTENDED RELEASE ORAL
Qty: 30 TABLET | Refills: 4 | Status: SHIPPED | OUTPATIENT
Start: 2018-11-19 | End: 2019-04-18 | Stop reason: SDUPTHER

## 2018-12-20 ENCOUNTER — TELEPHONE (OUTPATIENT)
Dept: FAMILY MEDICINE CLINIC | Age: 28
End: 2018-12-20

## 2018-12-23 ENCOUNTER — HOSPITAL ENCOUNTER (EMERGENCY)
Age: 28
Discharge: HOME | End: 2018-12-23
Attending: EMERGENCY MEDICINE
Payer: COMMERCIAL

## 2018-12-23 VITALS
HEART RATE: 106 BPM | RESPIRATION RATE: 14 BRPM | WEIGHT: 197.09 LBS | SYSTOLIC BLOOD PRESSURE: 120 MMHG | OXYGEN SATURATION: 97 % | BODY MASS INDEX: 31.68 KG/M2 | HEIGHT: 66 IN | DIASTOLIC BLOOD PRESSURE: 80 MMHG | TEMPERATURE: 98.4 F

## 2018-12-23 DIAGNOSIS — J02.9 ACUTE PHARYNGITIS, UNSPECIFIED ETIOLOGY: Primary | ICD-10-CM

## 2018-12-23 LAB — S PYO AG THROAT QL: NEGATIVE

## 2018-12-23 PROCEDURE — 87880 STREP A ASSAY W/OPTIC: CPT

## 2018-12-23 PROCEDURE — 87081 CULTURE SCREEN ONLY: CPT

## 2018-12-23 PROCEDURE — 99283 EMERGENCY DEPT VISIT LOW MDM: CPT

## 2018-12-23 ASSESSMENT — PAIN SCALES - GENERAL
PAINLEVEL_OUTOF10: 4
PAINLEVEL_OUTOF10: 4

## 2018-12-23 ASSESSMENT — PAIN DESCRIPTION - LOCATION: LOCATION: THROAT

## 2018-12-23 ASSESSMENT — PAIN DESCRIPTION - DESCRIPTORS: DESCRIPTORS: SORE

## 2018-12-23 ASSESSMENT — PAIN DESCRIPTION - FREQUENCY: FREQUENCY: CONTINUOUS

## 2018-12-23 ASSESSMENT — PAIN DESCRIPTION - PAIN TYPE: TYPE: ACUTE PAIN

## 2018-12-23 NOTE — ED PROVIDER NOTES
CHIEF COMPLAINT  Pharyngitis (onset of sore throat yesterday, dry cough,)      HISTORY OF PRESENT ILLNESS  Joy Florian  is a 25 y.o. female who presents to the ED at via private vehicle complaining of sore throat. Child is with same also is here. Symptoms started yesterday. There's been no fever or chills associated with this. Patient is able swallow without difficulty though it is sore. Patient had strep throat as a child multiple times was concerned just prior to Kyle. There are no other complaints, modifying factors or associated symptoms. Nursing notes reviewed. Past medical history:  has a past medical history of Abnormal Pap smear; Abnormal Pap smear of cervix; Abnormal umbilical cord; ADHD (attention deficit hyperactivity disorder); Anxiety; Asthma; Bipolar 1 disorder ([facility] [address] Diabetes mellitus ([facility] [address] Endometriosis; Febrile convulsions (simple), unspecified; History of chicken pox (87); HPV in female; Irritable bowel; Positive PPD, treated; Postpartum depression; Pre-eclampsia in third trimester (2012); and Thyroid disease. Past surgical history:  has a past surgical history that includes  section and Breast surgery (). Home medications:   Prior to Admission medications    Medication Sig Start Date End Date Taking?  Authorizing Provider   QUEtiapine (SEROQUEL XR) 150 MG TB24 extended release tablet TAKE ONE TABLET BY MOUTH DAILY 18  Yes Nadya Olvera MD   omeprazole (PRILOSEC) 20 MG delayed release capsule TAKE ONE CAPSULE BY MOUTH DAILY 10/15/18  Yes Nadya Olvera MD   levothyroxine (SYNTHROID) 50 MCG tablet TAKE ONE TABLET BY MOUTH DAILY 10/10/18  Yes Nadya Olvera MD   albuterol sulfate HFA (VENTOLIN HFA) 108 (90 Base) MCG/ACT inhaler Inhale 2 puffs into the lungs daily as needed for Wheezing or Shortness of Breath 18  Yes Nadya Olvera MD       Allergies   Allergen Reactions    Latex Swelling and Rash    Kiwi Extract Shortness Of Breath    Fluoxetine Nausea Only    Tramadol Other (See Comments)     Hallucinations.  Wellbutrin [Bupropion Hcl] Nausea Only    Zoloft [Sertraline Hcl]      overstim    Shellfish-Derived Products Swelling and Rash       Social history:  reports that she has never smoked. She has never used smokeless tobacco. She reports that she does not drink alcohol or use drugs. Family history:    Family History   Problem Relation Age of Onset    Mental Illness Father         bipolar    Heart Disease Father 43        MI x 3    Cancer Sister         cervical    Heart Failure Maternal Grandmother     Asthma Maternal Grandmother        REVIEW OF SYSTEMS  6 systems reviewed, pertinent positives per HPI otherwise noted to be negative    PHYSICAL EXAM  Vitals:    12/23/18 1630   BP: 120/80   Pulse: 106   Resp: 14   Temp: 98.4 °F (36.9 °C)   SpO2: 97%       GENERAL: Patient is well-developed, well-nourished,  no acute distress. No apparent discomfort. Non toxic appearing. HEENT:  Normocephalic, atraumatic. PERRL. Conjunctiva appear normal.  External ears are normal.  MMM posterior pharynx is without injection or exudate. Uvula is midline. Phonation is normal.  No tender lymphadenopathy of the anterior cervical nodes. NECK: Supple with normal ROM. Trachea midline  LUNGS:  Normal work of breathing. Speaking comfortably in full sentences. EXTREMITIES: 2+ distal pulses w/o edema. MUSCULOSKELETAL:  Atraumatic extremities with normal ROM grossly. No obvious bony deformities. SKIN: Warm/dry. No rashes/lesions noted. PSYCHIATRIC: Patient is alert and oriented with normal affect  NEUROLOGIC: Cranial nerves grossly intact. Moves all extremities with equal strength. No gross sensory deficits. Answers questions/follows commands appropriately. ED COURSE/MDM  Nursing notes reviewed.   Pt was given the following medications or treatments in the ED: Patient was seen examined and rapid strep test was obtained results Were

## 2018-12-26 LAB — S PYO THROAT QL CULT: NORMAL

## 2019-01-14 ENCOUNTER — OFFICE VISIT (OUTPATIENT)
Dept: FAMILY MEDICINE CLINIC | Age: 29
End: 2019-01-14
Payer: COMMERCIAL

## 2019-01-14 VITALS
BODY MASS INDEX: 31.15 KG/M2 | WEIGHT: 193 LBS | SYSTOLIC BLOOD PRESSURE: 118 MMHG | RESPIRATION RATE: 18 BRPM | DIASTOLIC BLOOD PRESSURE: 80 MMHG | HEART RATE: 112 BPM

## 2019-01-14 DIAGNOSIS — E03.9 ACQUIRED HYPOTHYROIDISM: ICD-10-CM

## 2019-01-14 DIAGNOSIS — R53.82 CHRONIC FATIGUE: Primary | ICD-10-CM

## 2019-01-14 DIAGNOSIS — R53.82 CHRONIC FATIGUE: ICD-10-CM

## 2019-01-14 LAB
BASOPHILS ABSOLUTE: 0 K/UL (ref 0–0.2)
BASOPHILS RELATIVE PERCENT: 0.6 %
EOSINOPHILS ABSOLUTE: 0.1 K/UL (ref 0–0.6)
EOSINOPHILS RELATIVE PERCENT: 2.4 %
FOLATE: >20 NG/ML (ref 4.78–24.2)
HCT VFR BLD CALC: 46.9 % (ref 36–48)
HEMOGLOBIN: 15.3 G/DL (ref 12–16)
LYMPHOCYTES ABSOLUTE: 1.7 K/UL (ref 1–5.1)
LYMPHOCYTES RELATIVE PERCENT: 27.9 %
MCH RBC QN AUTO: 28.3 PG (ref 26–34)
MCHC RBC AUTO-ENTMCNC: 32.6 G/DL (ref 31–36)
MCV RBC AUTO: 86.8 FL (ref 80–100)
MONOCYTES ABSOLUTE: 0.4 K/UL (ref 0–1.3)
MONOCYTES RELATIVE PERCENT: 6.9 %
NEUTROPHILS ABSOLUTE: 3.7 K/UL (ref 1.7–7.7)
NEUTROPHILS RELATIVE PERCENT: 62.2 %
PDW BLD-RTO: 13.1 % (ref 12.4–15.4)
PLATELET # BLD: 253 K/UL (ref 135–450)
PMV BLD AUTO: 10.1 FL (ref 5–10.5)
RBC # BLD: 5.4 M/UL (ref 4–5.2)
T4 FREE: 1.9 NG/DL (ref 0.9–1.8)
TSH REFLEX: <0.01 UIU/ML (ref 0.27–4.2)
VITAMIN B-12: 645 PG/ML (ref 211–911)
WBC # BLD: 6 K/UL (ref 4–11)

## 2019-01-14 PROCEDURE — 99213 OFFICE O/P EST LOW 20 MIN: CPT | Performed by: NURSE PRACTITIONER

## 2019-01-14 PROCEDURE — G8427 DOCREV CUR MEDS BY ELIG CLIN: HCPCS | Performed by: NURSE PRACTITIONER

## 2019-01-14 PROCEDURE — G8417 CALC BMI ABV UP PARAM F/U: HCPCS | Performed by: NURSE PRACTITIONER

## 2019-01-14 PROCEDURE — G8484 FLU IMMUNIZE NO ADMIN: HCPCS | Performed by: NURSE PRACTITIONER

## 2019-01-14 PROCEDURE — 1036F TOBACCO NON-USER: CPT | Performed by: NURSE PRACTITIONER

## 2019-01-14 ASSESSMENT — ENCOUNTER SYMPTOMS
DIARRHEA: 0
SHORTNESS OF BREATH: 0
CHEST TIGHTNESS: 0
COUGH: 0
CONSTIPATION: 0
NAUSEA: 0
VOMITING: 0

## 2019-01-15 DIAGNOSIS — E03.9 ACQUIRED HYPOTHYROIDISM: ICD-10-CM

## 2019-01-15 DIAGNOSIS — R53.82 CHRONIC FATIGUE: Primary | ICD-10-CM

## 2019-01-15 RX ORDER — LEVOTHYROXINE SODIUM 0.03 MG/1
TABLET ORAL
Qty: 30 TABLET | Refills: 1 | Status: SHIPPED | OUTPATIENT
Start: 2019-01-15 | End: 2019-05-21 | Stop reason: SDUPTHER

## 2019-02-26 ENCOUNTER — TELEPHONE (OUTPATIENT)
Dept: FAMILY MEDICINE CLINIC | Age: 29
End: 2019-02-26

## 2019-02-26 RX ORDER — FLUCONAZOLE 150 MG/1
150 TABLET ORAL ONCE
Qty: 2 TABLET | Refills: 0 | Status: SHIPPED | OUTPATIENT
Start: 2019-02-26 | End: 2019-02-26

## 2019-02-28 ENCOUNTER — TELEPHONE (OUTPATIENT)
Dept: FAMILY MEDICINE CLINIC | Age: 29
End: 2019-02-28

## 2019-03-01 ENCOUNTER — OFFICE VISIT (OUTPATIENT)
Dept: FAMILY MEDICINE CLINIC | Age: 29
End: 2019-03-01
Payer: COMMERCIAL

## 2019-03-01 VITALS
HEART RATE: 82 BPM | WEIGHT: 193 LBS | SYSTOLIC BLOOD PRESSURE: 122 MMHG | RESPIRATION RATE: 18 BRPM | DIASTOLIC BLOOD PRESSURE: 80 MMHG | BODY MASS INDEX: 31.15 KG/M2

## 2019-03-01 DIAGNOSIS — N76.0 ACUTE VAGINITIS: ICD-10-CM

## 2019-03-01 DIAGNOSIS — R35.0 URINARY FREQUENCY: Primary | ICD-10-CM

## 2019-03-01 LAB
BILIRUBIN, POC: NEGATIVE
BLOOD URINE, POC: NEGATIVE
CLARITY, POC: ABNORMAL
COLOR, POC: ABNORMAL
GLUCOSE URINE, POC: NEGATIVE
KETONES, POC: NEGATIVE
LEUKOCYTE EST, POC: ABNORMAL
NITRITE, POC: NEGATIVE
PH, POC: 7
PROTEIN, POC: NEGATIVE
SPECIFIC GRAVITY, POC: 1.02
UROBILINOGEN, POC: 0.2

## 2019-03-01 PROCEDURE — G8417 CALC BMI ABV UP PARAM F/U: HCPCS | Performed by: NURSE PRACTITIONER

## 2019-03-01 PROCEDURE — 81002 URINALYSIS NONAUTO W/O SCOPE: CPT | Performed by: NURSE PRACTITIONER

## 2019-03-01 PROCEDURE — G8427 DOCREV CUR MEDS BY ELIG CLIN: HCPCS | Performed by: NURSE PRACTITIONER

## 2019-03-01 PROCEDURE — 1036F TOBACCO NON-USER: CPT | Performed by: NURSE PRACTITIONER

## 2019-03-01 PROCEDURE — G8484 FLU IMMUNIZE NO ADMIN: HCPCS | Performed by: NURSE PRACTITIONER

## 2019-03-01 PROCEDURE — 99214 OFFICE O/P EST MOD 30 MIN: CPT | Performed by: NURSE PRACTITIONER

## 2019-03-01 ASSESSMENT — ENCOUNTER SYMPTOMS
NAUSEA: 0
SHORTNESS OF BREATH: 0
ABDOMINAL PAIN: 0
COUGH: 0
DIARRHEA: 0
VOMITING: 0

## 2019-03-02 LAB
CANDIDA SPECIES, DNA PROBE: NORMAL
GARDNERELLA VAGINALIS, DNA PROBE: NORMAL
TRICHOMONAS VAGINALIS DNA: NORMAL

## 2019-03-03 LAB — URINE CULTURE, ROUTINE: NORMAL

## 2019-03-04 ENCOUNTER — TELEPHONE (OUTPATIENT)
Dept: FAMILY MEDICINE CLINIC | Age: 29
End: 2019-03-04

## 2019-04-18 ENCOUNTER — OFFICE VISIT (OUTPATIENT)
Dept: FAMILY MEDICINE CLINIC | Age: 29
End: 2019-04-18
Payer: COMMERCIAL

## 2019-04-18 VITALS
HEART RATE: 93 BPM | HEIGHT: 66 IN | DIASTOLIC BLOOD PRESSURE: 80 MMHG | TEMPERATURE: 98.1 F | SYSTOLIC BLOOD PRESSURE: 118 MMHG | RESPIRATION RATE: 16 BRPM | BODY MASS INDEX: 30.37 KG/M2 | WEIGHT: 189 LBS

## 2019-04-18 DIAGNOSIS — F32.89 ATYPICAL DEPRESSION: ICD-10-CM

## 2019-04-18 DIAGNOSIS — F33.1 MODERATE EPISODE OF RECURRENT MAJOR DEPRESSIVE DISORDER (HCC): Primary | ICD-10-CM

## 2019-04-18 DIAGNOSIS — E03.9 ACQUIRED HYPOTHYROIDISM: ICD-10-CM

## 2019-04-18 DIAGNOSIS — H61.21 IMPACTED CERUMEN OF RIGHT EAR: ICD-10-CM

## 2019-04-18 LAB — TSH REFLEX: 3.77 UIU/ML (ref 0.27–4.2)

## 2019-04-18 PROCEDURE — 99214 OFFICE O/P EST MOD 30 MIN: CPT | Performed by: FAMILY MEDICINE

## 2019-04-18 PROCEDURE — G8427 DOCREV CUR MEDS BY ELIG CLIN: HCPCS | Performed by: FAMILY MEDICINE

## 2019-04-18 PROCEDURE — 1036F TOBACCO NON-USER: CPT | Performed by: FAMILY MEDICINE

## 2019-04-18 PROCEDURE — G8417 CALC BMI ABV UP PARAM F/U: HCPCS | Performed by: FAMILY MEDICINE

## 2019-04-18 RX ORDER — QUETIAPINE 300 MG/1
TABLET, FILM COATED, EXTENDED RELEASE ORAL
Qty: 30 TABLET | Refills: 1 | Status: SHIPPED | OUTPATIENT
Start: 2019-04-18 | End: 2019-05-23 | Stop reason: SDUPTHER

## 2019-04-18 NOTE — PATIENT INSTRUCTIONS
MA- irrigate R ear  INSTRUCTIONS  · NEXT APPOINTMENT: Please schedule check-up in 1 month. · PLEASE TAKE THIS FORM TO CHECK-OUT WINDOW TO SCHEDULE NEXT VISIT. · See Chris Alfredo CNP for psychiatry at Dr. Lawson Hernandez office. Schedule at the  when checking out or call [phone]. · PLEASE GET BLOODWORK DRAWN TODAY ON FIRST FLOOR in 170. Take orders with you. RESULTS- most blood tests back in couple days. We will call you if any problems. If bloodwork good, you will get letter in mail or notified thru [address] (if signed up) within 2 weeks. If you do not, please call office. · See sleep specialist if not better in a month. · INCREASE seroquel to 300 mg (150 and 150). · May need to add venlafaxine in a few weeks if not better. Call if needed.

## 2019-04-18 NOTE — PROGRESS NOTES
allergies, past medical, and social histories were reviewed and updated as appropriate. CHART REVIEW  Health Maintenance   Topic Date Due    TSH testing  01/14/2020    Cervical cancer screen  02/17/2020    DTaP/Tdap/Td vaccine (8 - Td) 08/11/2028    Flu vaccine  Completed    Pneumococcal 0-64 years Vaccine  Completed    HIV screen  Completed     The ASCVD Risk score (Brenden Mattson et al., 2013) failed to calculate for the following reasons: The 2013 ASCVD risk score is only valid for ages 36 to 78  Prior to Visit Medications    Medication Sig Taking? Authorizing Provider   QUEtiapine (SEROQUEL XR) 300 MG extended release tablet TAKE ONE TABLET BY MOUTH DAILY Yes Mandy Kimball MD   levothyroxine (SYNTHROID) 25 MCG tablet TAKE ONE TABLET BY MOUTH DAILY Yes YENNY Bravo - CNP   omeprazole (PRILOSEC) 20 MG delayed release capsule TAKE ONE CAPSULE BY MOUTH DAILY Yes Mandy Kimball MD      Family History   Problem Relation Age of Onset    Mental Illness Father         bipolar    Heart Disease Father 43        MI x 3    Cancer Sister         cervical    Heart Failure Maternal Grandmother     Asthma Maternal Grandmother      Social History     Tobacco Use    Smoking status: Never Smoker    Smokeless tobacco: Never Used    Tobacco comment: congratulated on nonsmoking status.    Substance Use Topics    Alcohol use: No     Alcohol/week: 0.0 oz    Drug use: No      LAST LABS  Cholesterol, Total   Date Value Ref Range Status   08/06/2015 148 0 - 199 mg/dL Final     LDL Calculated   Date Value Ref Range Status   08/06/2015 93 <100 mg/dL Final     HDL   Date Value Ref Range Status   08/06/2015 41 40 - 60 mg/dL Final     Triglycerides   Date Value Ref Range Status   08/06/2015 69 0 - 150 mg/dL Final     Lab Results   Component Value Date    GLUCOSE 136 (H) 04/16/2018     Lab Results   Component Value Date     04/16/2018    K 4.2 04/16/2018    CREATININE <0.5 (L) 04/16/2018     Lab Results Component Value Date    WBC 6.0 01/14/2019    HGB 15.3 01/14/2019    HCT 46.9 01/14/2019    MCV 86.8 01/14/2019     01/14/2019     Lab Results   Component Value Date    ALT 23 04/16/2018    AST 16 04/16/2018    ALKPHOS 62 04/16/2018    BILITOT <0.2 04/16/2018     TSH (uIU/mL)   Date Value   10/10/2018 0.79     Lab Results   Component Value Date    LABA1C 5.4 09/15/2017     Objective:   PHYSICAL EXAM  /80 (Site: Right Upper Arm, Position: Sitting, Cuff Size: Large Adult)   Pulse 93   Temp 98.1 °F (36.7 °C) (Oral)   Resp 16   Ht 5' 6\" (1.676 m)   Wt 189 lb (85.7 kg)   BMI 30.51 kg/m²   BP Readings from Last 5 Encounters:   04/18/19 118/80   03/01/19 122/80   01/14/19 118/80   12/23/18 120/80   10/10/18 116/70     Wt Readings from Last 5 Encounters:   04/18/19 189 lb (85.7 kg)   03/01/19 193 lb (87.5 kg)   01/14/19 193 lb (87.5 kg)   12/23/18 197 lb 1.5 oz (89.4 kg)   10/10/18 198 lb (89.8 kg)      GENERAL: well-developed, well-nourished, alert, no distress, calm  PSYCH:  full facial expressions, good grooming, good insight, normal perception, normal reasoning and normal speech pattern and content and normal thought patterns,   ENT:   · External nose and ears appear normal  · normal TM and external ear canal left ear and abnormal external canal right ear - wax flake covering eardrum  · Pharynx: normal. Exudates: None  · Lips, mucosa, and tongue normal   · Hearing grossly normal.     CARDIOVASC: regular rate and rhythm, S1, S2 normal, no murmur, click, rub or gallop  · Apical impulse normal  LUNGS:    · Breathing unlabored  · clear to auscultation bilaterally and good air movement    The time spent counseling patient was 10 minutes of 15 minute appointment. Reviewed concept of anxiety as biochemical imbalance of neurotransmitters and rationale for treatment.   Instructed patient to contact office or on-call physician promptly should condition worsen or any new symptoms appear and provided on-call telephone numbers. IF THE PATIENT HAS ANY SUICIDAL OR HOMICIDAL IDEATIONS, CALL THE OFFICE, DISCUSS WITH A SUPPORT MEMBER, OR GO TO THE ER IMMEDIATELY. Patient was agreeable with this plan. Assessment and Plan:      Diagnosis Orders   1. Moderate episode of recurrent major depressive disorder (HCC)  QUEtiapine (SEROQUEL XR) 300 MG extended release tablet   2. Atypical depression  QUEtiapine (SEROQUEL XR) 300 MG extended release tablet   3. Acquired hypothyroidism  TSH with Reflex   4. Impacted cerumen of right ear     Stable. Plan as above and below. Assistant irrigated ear(s) with good results. Tolerated well. COUNSELLING  Discussed use, benefit, and side effects of prescribed medications. Barriers to medication compliance addressed. All patient questions answered. Pt voiced understanding. MA- irrigate R ear  INSTRUCTIONS  · NEXT APPOINTMENT: Please schedule check-up in 1 month. · PLEASE TAKE THIS FORM TO CHECK-OUT WINDOW TO SCHEDULE NEXT VISIT. · See Layo Kruger CNP for psychiatry at Dr. Jamin Grant office. Schedule at the  when checking out or call [phone]. · PLEASE GET BLOODWORK DRAWN TODAY ON FIRST FLOOR in 170. Take orders with you. RESULTS- most blood tests back in couple days. We will call you if any problems. If bloodwork good, you will get letter in mail or notified thru [address] (if signed up) within 2 weeks. If you do not, please call office. · See sleep specialist if not better in a month. · INCREASE seroquel to 300 mg (150 and 150). · May need to add venlafaxine in a few weeks if not better. Call if needed.

## 2019-05-21 DIAGNOSIS — E03.9 ACQUIRED HYPOTHYROIDISM: ICD-10-CM

## 2019-05-22 RX ORDER — OMEPRAZOLE 20 MG/1
CAPSULE, DELAYED RELEASE ORAL
Qty: 30 CAPSULE | Refills: 3 | Status: SHIPPED | OUTPATIENT
Start: 2019-05-22 | End: 2019-09-25 | Stop reason: SDUPTHER

## 2019-05-23 ENCOUNTER — OFFICE VISIT (OUTPATIENT)
Dept: FAMILY MEDICINE CLINIC | Age: 29
End: 2019-05-23
Payer: COMMERCIAL

## 2019-05-23 VITALS
DIASTOLIC BLOOD PRESSURE: 80 MMHG | WEIGHT: 188 LBS | HEART RATE: 100 BPM | HEIGHT: 66 IN | BODY MASS INDEX: 30.22 KG/M2 | TEMPERATURE: 98.2 F | OXYGEN SATURATION: 98 % | SYSTOLIC BLOOD PRESSURE: 116 MMHG

## 2019-05-23 DIAGNOSIS — F33.1 MODERATE EPISODE OF RECURRENT MAJOR DEPRESSIVE DISORDER (HCC): ICD-10-CM

## 2019-05-23 DIAGNOSIS — F32.89 ATYPICAL DEPRESSION: ICD-10-CM

## 2019-05-23 DIAGNOSIS — E03.9 ACQUIRED HYPOTHYROIDISM: ICD-10-CM

## 2019-05-23 PROCEDURE — 99213 OFFICE O/P EST LOW 20 MIN: CPT | Performed by: FAMILY MEDICINE

## 2019-05-23 PROCEDURE — G8417 CALC BMI ABV UP PARAM F/U: HCPCS | Performed by: FAMILY MEDICINE

## 2019-05-23 PROCEDURE — G8427 DOCREV CUR MEDS BY ELIG CLIN: HCPCS | Performed by: FAMILY MEDICINE

## 2019-05-23 PROCEDURE — 1036F TOBACCO NON-USER: CPT | Performed by: FAMILY MEDICINE

## 2019-05-23 RX ORDER — LEVOTHYROXINE SODIUM 0.03 MG/1
TABLET ORAL
Qty: 30 TABLET | Refills: 0 | Status: SHIPPED | OUTPATIENT
Start: 2019-05-23 | End: 2019-05-23 | Stop reason: SDUPTHER

## 2019-05-23 RX ORDER — LEVOTHYROXINE SODIUM 0.03 MG/1
TABLET ORAL
Qty: 30 TABLET | Refills: 3 | Status: SHIPPED | OUTPATIENT
Start: 2019-05-23 | End: 2019-09-25 | Stop reason: SDUPTHER

## 2019-05-23 RX ORDER — QUETIAPINE 300 MG/1
TABLET, FILM COATED, EXTENDED RELEASE ORAL
Qty: 30 TABLET | Refills: 3 | Status: SHIPPED | OUTPATIENT
Start: 2019-05-23 | End: 2019-09-25 | Stop reason: SDUPTHER

## 2019-05-23 NOTE — PATIENT INSTRUCTIONS
INSTRUCTIONS  · NEXT APPOINTMENT: Please schedule annual complete physical (30 minutes) in 4 months. · PLEASE TAKE THIS FORM TO CHECK-OUT WINDOW TO SCHEDULE NEXT VISIT. · Try moving seroquel to dinner time to reduce morning grogginess.

## 2019-05-23 NOTE — PROGRESS NOTES
Mood Visit  Subjective:     Chief Complaint   Patient presents with    Anxiety     f/u check on anxiety and depression    Medication Refill      Allyn Buckley is a 25 y.o. female who presents for follow up of mood issue. Mood better. Little groggy with meds. Exercising. Down clothing size. More active and happy. HISTORY  Are you working with a psychologist / psychiatrist?  No  Have you felt your symptoms are better, worse, or unchanged since your last visit   better  Mood is good. Sleep is good. Patient denies depression symptoms of: suicidal intention  Patient denies anxiety symptoms of: losing control. HISTORY:  Patient's medications, allergies, past medical, and social histories were reviewed and updated as appropriate. CHART REVIEW  Health Maintenance   Topic Date Due    Cervical cancer screen  02/17/2020    TSH testing  04/18/2020    DTaP/Tdap/Td vaccine (8 - Td) 08/11/2028    Flu vaccine  Completed    Pneumococcal 0-64 years Vaccine  Completed    HIV screen  Completed     The ASCVD Risk score (Candido Chen et al., 2013) failed to calculate for the following reasons: The 2013 ASCVD risk score is only valid for ages 36 to 78  Prior to Visit Medications    Medication Sig Taking?  Authorizing Provider   levothyroxine (SYNTHROID) 25 MCG tablet Take 1 tablet daily Yes Talia Knight MD   QUEtiapine (SEROQUEL XR) 300 MG extended release tablet TAKE ONE TABLET BY MOUTH DAILY Yes Talia Knight MD   omeprazole (PRILOSEC) 20 MG delayed release capsule TAKE ONE CAPSULE BY MOUTH DAILY Yes Talia Knight MD      Family History   Problem Relation Age of Onset    Mental Illness Father         bipolar    Heart Disease Father 43        MI x 3    Cancer Sister         cervical    Heart Failure Maternal Grandmother     Asthma Maternal Grandmother      Social History     Tobacco Use    Smoking status: Never Smoker    Smokeless tobacco: Never Used    Tobacco comment: congratulated on nonsmoking status. Substance Use Topics    Alcohol use: No     Alcohol/week: 0.0 oz    Drug use: No      LAST LABS  Cholesterol, Total   Date Value Ref Range Status   08/06/2015 148 0 - 199 mg/dL Final     LDL Calculated   Date Value Ref Range Status   08/06/2015 93 <100 mg/dL Final     HDL   Date Value Ref Range Status   08/06/2015 41 40 - 60 mg/dL Final     Triglycerides   Date Value Ref Range Status   08/06/2015 69 0 - 150 mg/dL Final     Lab Results   Component Value Date    GLUCOSE 136 (H) 04/16/2018     Lab Results   Component Value Date     04/16/2018    K 4.2 04/16/2018    CREATININE <0.5 (L) 04/16/2018     Lab Results   Component Value Date    WBC 6.0 01/14/2019    HGB 15.3 01/14/2019    HCT 46.9 01/14/2019    MCV 86.8 01/14/2019     01/14/2019     Lab Results   Component Value Date    ALT 23 04/16/2018    AST 16 04/16/2018    ALKPHOS 62 04/16/2018    BILITOT <0.2 04/16/2018     TSH (uIU/mL)   Date Value   10/10/2018 0.79     Lab Results   Component Value Date    LABA1C 5.4 09/15/2017     Objective:   PHYSICAL EXAM  /80 (Site: Right Upper Arm, Position: Sitting, Cuff Size: Medium Adult)   Pulse 100   Temp 98.2 °F (36.8 °C) (Oral)   Ht 5' 6\" (1.676 m)   Wt 188 lb (85.3 kg)   LMP 05/12/2019   SpO2 98%   Breastfeeding? No   BMI 30.34 kg/m²   BP Readings from Last 5 Encounters:   05/23/19 116/80   04/18/19 118/80   03/01/19 122/80   01/14/19 118/80   12/23/18 120/80     Wt Readings from Last 5 Encounters:   05/23/19 188 lb (85.3 kg)   04/18/19 189 lb (85.7 kg)   03/01/19 193 lb (87.5 kg)   01/14/19 193 lb (87.5 kg)   12/23/18 197 lb 1.5 oz (89.4 kg)      GENERAL: well-developed, well-nourished, alert, no distress, calm  PSYCH:  full facial expressions, good grooming, good insight, normal perception, normal reasoning and normal speech pattern and content and normal thought patterns    The time spent counseling patient was 10 minutes of 15 minute appointment.     Reviewed concept of anxiety as

## 2019-05-27 ENCOUNTER — HOSPITAL ENCOUNTER (EMERGENCY)
Age: 29
Discharge: HOME | End: 2019-05-27
Attending: EMERGENCY MEDICINE
Payer: COMMERCIAL

## 2019-05-27 VITALS
BODY MASS INDEX: 30.4 KG/M2 | OXYGEN SATURATION: 95 % | HEIGHT: 66 IN | HEART RATE: 119 BPM | TEMPERATURE: 100 F | SYSTOLIC BLOOD PRESSURE: 117 MMHG | RESPIRATION RATE: 20 BRPM | DIASTOLIC BLOOD PRESSURE: 86 MMHG | WEIGHT: 189.15 LBS

## 2019-05-27 DIAGNOSIS — J02.0 STREP PHARYNGITIS: Primary | ICD-10-CM

## 2019-05-27 LAB — S PYO AG THROAT QL: POSITIVE

## 2019-05-27 PROCEDURE — 87880 STREP A ASSAY W/OPTIC: CPT

## 2019-05-27 PROCEDURE — 99283 EMERGENCY DEPT VISIT LOW MDM: CPT

## 2019-05-27 PROCEDURE — 6370000000 HC RX 637 (ALT 250 FOR IP): Performed by: EMERGENCY MEDICINE

## 2019-05-27 RX ORDER — AMOXICILLIN 250 MG/1
500 CAPSULE ORAL ONCE
Status: COMPLETED | OUTPATIENT
Start: 2019-05-27 | End: 2019-05-27

## 2019-05-27 RX ORDER — AMOXICILLIN 500 MG/1
500 CAPSULE ORAL 2 TIMES DAILY
Qty: 20 CAPSULE | Refills: 0 | Status: SHIPPED | OUTPATIENT
Start: 2019-05-27 | End: 2019-06-06

## 2019-05-27 RX ORDER — IBUPROFEN 600 MG/1
600 TABLET ORAL EVERY 8 HOURS PRN
Qty: 21 TABLET | Refills: 0 | Status: SHIPPED | OUTPATIENT
Start: 2019-05-27 | End: 2021-01-06

## 2019-05-27 RX ORDER — LANOLIN ALCOHOL/MO/W.PET/CERES
3 CREAM (GRAM) TOPICAL DAILY
COMMUNITY
End: 2022-03-30 | Stop reason: SDUPTHER

## 2019-05-27 RX ORDER — IBUPROFEN 600 MG/1
600 TABLET ORAL ONCE
Status: COMPLETED | OUTPATIENT
Start: 2019-05-27 | End: 2019-05-27

## 2019-05-27 RX ADMIN — AMOXICILLIN 500 MG: 250 CAPSULE ORAL at 05:10

## 2019-05-27 RX ADMIN — IBUPROFEN 600 MG: 600 TABLET ORAL at 05:10

## 2019-05-27 ASSESSMENT — PAIN SCALES - GENERAL
PAINLEVEL_OUTOF10: 7

## 2019-05-27 ASSESSMENT — PAIN DESCRIPTION - LOCATION
LOCATION: THROAT

## 2019-05-27 ASSESSMENT — ENCOUNTER SYMPTOMS
VOMITING: 0
VOICE CHANGE: 0
SORE THROAT: 1
DIARRHEA: 0
FACIAL SWELLING: 0
NAUSEA: 0
SHORTNESS OF BREATH: 0
TROUBLE SWALLOWING: 0
ABDOMINAL PAIN: 0

## 2019-05-27 ASSESSMENT — PAIN DESCRIPTION - DESCRIPTORS
DESCRIPTORS: ACHING
DESCRIPTORS: ACHING

## 2019-05-27 ASSESSMENT — PAIN DESCRIPTION - PAIN TYPE
TYPE: ACUTE PAIN

## 2019-05-27 ASSESSMENT — PAIN DESCRIPTION - ONSET
ONSET: ON-GOING
ONSET: ON-GOING

## 2019-05-27 ASSESSMENT — PAIN DESCRIPTION - DIRECTION: RADIATING_TOWARDS: EAR

## 2019-05-27 ASSESSMENT — PAIN - FUNCTIONAL ASSESSMENT
PAIN_FUNCTIONAL_ASSESSMENT: 0-10
PAIN_FUNCTIONAL_ASSESSMENT: ACTIVITIES ARE NOT PREVENTED

## 2019-05-27 ASSESSMENT — PAIN DESCRIPTION - PROGRESSION
CLINICAL_PROGRESSION: GRADUALLY WORSENING
CLINICAL_PROGRESSION: NOT CHANGED

## 2019-05-27 ASSESSMENT — PAIN DESCRIPTION - FREQUENCY
FREQUENCY: CONTINUOUS
FREQUENCY: CONTINUOUS

## 2019-05-27 ASSESSMENT — PAIN DESCRIPTION - ORIENTATION
ORIENTATION: RIGHT;POSTERIOR
ORIENTATION: RIGHT;POSTERIOR
ORIENTATION: POSTERIOR;RIGHT
ORIENTATION: RIGHT;POSTERIOR

## 2019-05-27 NOTE — ED TRIAGE NOTES
Patient to EMERGENCY DEPARTMENT ambulatory complains of sore throat for past 36 hours- states feels like strept throat.  Able to drink fluids

## 2019-05-27 NOTE — ED PROVIDER NOTES
[facility]  eMERGENCY dEPARTMENT eNCOUnter        Pt Name: Amada Loyola  MRN: [id number]  Nikk[place]fkeith 1990  Date of evaluation: 5/27/2019  Provider: Jackline Lombard, MD  PCP: Chris Westfall MD  ED Attending: Nanci Pham MD    CHIEF COMPLAINT       Chief Complaint   Patient presents with    Pharyngitis     sore thraot for approx 36 hours- able to drink fluids       HISTORY OF PRESENT ILLNESS   (Location/Symptom, Timing/Onset, Context/Setting, Quality, Duration, Modifying Factors, Severity)  Note limiting factors. Amada Loyola is a 25 y.o. female     Information obtained from patient, nursing staff, chart. Pain level VII/X  Pain medication offer. Patient comes in with complaints of sore throat times one to 2 days. She still able to drink fluids but has sore throat uvula is midline there is no peritonsillar abscess noted. Mouth soft no trismus no Jeffrey angina mucous membranes are moist. But she has some shotty anterior cervical nodes. Cellulitis patient will be treated. Patient also will have a strep screen obtained. See chart for details. Nursing Notes were all reviewed and agreed with or any disagreements were addressed  in the HPI. REVIEW OF SYSTEMS    (2-9 systems for level 4, 10 or more for level 5)     Review of Systems   Constitutional: Positive for fever. Negative for chills. HENT: Positive for sore throat. Negative for drooling, ear discharge, ear pain, facial swelling, mouth sores, trouble swallowing and voice change. Respiratory: Negative for shortness of breath. Cardiovascular: Negative for chest pain. Gastrointestinal: Negative for abdominal pain, diarrhea, nausea and vomiting. Skin: Negative for rash. Positives and Pertinent negatives as per HPI. Except as noted abovein the ROS, all other systems were reviewed and negative.        PAST MEDICAL HISTORY     Past Medical History:   Diagnosis Date    Abnormal Pap smear Colposcopy; HPV.  Abnormal Pap smear of cervix     Colposcopy positive for HPV.  Abnormal umbilical cord     peripheral cord insertion. monitor growth.  ADHD (attention deficit hyperactivity disorder)     no meds currently.  Anxiety     Currently taking Fluoxitine    Asthma     uses daily inhaler and rescue inhaler PRN.  Bipolar 1 disorder (HCC)     was taking prozac early in pregnancy. switched to Quetiapine, Class C, consider weaning in 3rd trimester.  Diabetes mellitus ([facility] [address]     GDM-insulin dependent    Endometriosis     Febrile convulsions (simple), unspecified     Febrile seizure as an infant one time    History of chicken pox 87    HPV in female     Irritable bowel     Positive PPD, treated     negative chest xray.  Postpartum depression     Pre-eclampsia in third trimester 2012    with G1. Baseline PIH labs and 24 hour urine WNL with G2.    Thyroid disease     Hypothyroid. taking Synthroid. SURGICAL HISTORY     Past Surgical History:   Procedure Laterality Date    BREAST SURGERY      reduction     SECTION      LTCS for suspected macrosomia and PIH. CURRENTMEDICATIONS       Previous Medications    LEVOTHYROXINE (SYNTHROID) 25 MCG TABLET    Take 1 tablet daily    MELATONIN 3 MG TABS TABLET    Take 3 mg by mouth daily    OMEPRAZOLE (PRILOSEC) 20 MG DELAYED RELEASE CAPSULE    TAKE ONE CAPSULE BY MOUTH DAILY    QUETIAPINE (SEROQUEL XR) 300 MG EXTENDED RELEASE TABLET    TAKE ONE TABLET BY MOUTH DAILY         ALLERGIES     Latex; Kiwi extract; Fluoxetine; Tramadol; Wellbutrin [bupropion hcl];  Zoloft [sertraline hcl]; and Shellfish-derived products    FAMILYHISTORY       Family History   Problem Relation Age of Onset    Mental Illness Father         bipolar    Heart Disease Father 43        MI x 3    Cancer Sister         cervical    Heart Failure Maternal Grandmother     Asthma Maternal Grandmother           SOCIAL HISTORY       Social Normocephalic and atraumatic. Right Ear: External ear normal.   Left Ear: External ear normal.   Nose: Nose normal.   Mouth/Throat: Oropharynx is clear and moist. No oropharyngeal exudate. No Jeffrey angina   Eyes: Pupils are equal, round, and reactive to light. Conjunctivae and EOM are normal.   Neck: Normal range of motion. Neck supple. Pulmonary/Chest: Effort normal.   Musculoskeletal: Normal range of motion. Lymphadenopathy:     She has cervical adenopathy. Neurological: She is alert and oriented to person, place, and time. She has normal reflexes. Skin: Skin is warm and dry. Capillary refill takes less than 2 seconds. No rash noted. Psychiatric: She has a normal mood and affect. Nursing note and vitals reviewed. DIAGNOSTIC RESULTS   LABS:    Labs Reviewed   STREP SCREEN GROUP A THROAT - Abnormal; Notable for the following components:       Result Value    Rapid Strep A Screen POSITIVE (*)     All other components within normal limits    Narrative:     Performed at:  [address]   Phone [phone]       All other labs were within normal range or not returned as of this dictation. EKG: All EKG's are interpreted by the Emergency Department Physician who either signs orCo-signs this chart in the absence of a cardiologist.  Please see their note for interpretation of EKG. RADIOLOGY:   Non-plain film images such as CT, Ultrasound and MRI are read by the radiologist. Plain radiographic images are visualized andpreliminarily interpreted by the  ED Provider with the below findings:        Interpretation per the Radiologist below, if available at the time ofthis note:    No orders to display     No results found.       PROCEDURES   Unless otherwise noted below, none     Procedures    CRITICAL CARE TIME   N/A    CONSULTS:  None      EMERGENCY DEPARTMENT COURSE and DIFFERENTIAL DIAGNOSIS/MDM:   Vitals:    Vitals: 05/27/19 0443   BP: 117/86   Resp: 20   Temp: 100 °F (37.8 °C)   TempSrc: Oral   SpO2: 95%   Weight: 189 lb 2.5 oz (85.8 kg)   Height: 5' 6\" (1.676 m)       Patient was given the following medications:  Medications   amoxicillin (AMOXIL) capsule 500 mg (500 mg Oral Given 5/27/19 0510)   ibuprofen (ADVIL;MOTRIN) tablet 600 mg (600 mg Oral Given 5/27/19 0510)         Sore throat for strep pharyngitis    Patient representative strep is positive. This patient will be placed on amoxicillin and Motrin. Patient will follow up with PMD given written and verbal instructions in stable comply discharged in stable interactive condition after reevaluation per ER M.D. see chart for details. The patient tolerated their visit well. Theywere seen and evaluated by the attending physician, Estephanie Blackburn MD who agreed with the assessment and plan. The patient and / or the family were informed of the results of any tests, a time was given to answer questions, aplan was proposed and they agreed with plan. FINAL IMPRESSION      1.  Strep pharyngitis          DISPOSITION/PLAN   DISPOSITION Decision To Discharge 05/27/2019 05:10:42 AM      PATIENT REFERRED TO:  Jack Ross MD  [address] [place] [address]  [phone]    In 1 week        DISCHARGE MEDICATIONS:  New Prescriptions    AMOXICILLIN (AMOXIL) 500 MG CAPSULE    Take 1 capsule by mouth 2 times daily for 10 days    IBUPROFEN (ADVIL;MOTRIN) 600 MG TABLET    Take 1 tablet by mouth every 8 hours as needed for Pain       DISCONTINUED MEDICATIONS:  Discontinued Medications    No medications on file              (Please note that portions of this note were completed with a voice recognition program.  Efforts were made to edit the dictations but occasionally words aremis-transcribed.)    Nayeli Jacobs MD (electronically signed)          Estephanie Blackburn MD  05/27/19 4561

## 2019-05-28 ENCOUNTER — HOSPITAL ENCOUNTER (EMERGENCY)
Age: 29
Discharge: HOME | End: 2019-05-28
Payer: COMMERCIAL

## 2019-05-28 VITALS
TEMPERATURE: 98.3 F | SYSTOLIC BLOOD PRESSURE: 117 MMHG | HEART RATE: 110 BPM | BODY MASS INDEX: 30.78 KG/M2 | RESPIRATION RATE: 16 BRPM | WEIGHT: 190.7 LBS | DIASTOLIC BLOOD PRESSURE: 79 MMHG | OXYGEN SATURATION: 97 %

## 2019-05-28 DIAGNOSIS — J02.0 STREP PHARYNGITIS: Primary | ICD-10-CM

## 2019-05-28 PROCEDURE — 6360000002 HC RX W HCPCS: Performed by: NURSE PRACTITIONER

## 2019-05-28 PROCEDURE — 99282 EMERGENCY DEPT VISIT SF MDM: CPT

## 2019-05-28 RX ORDER — DEXAMETHASONE SODIUM PHOSPHATE 4 MG/ML
10 INJECTION, SOLUTION INTRA-ARTICULAR; INTRALESIONAL; INTRAMUSCULAR; INTRAVENOUS; SOFT TISSUE ONCE
Status: COMPLETED | OUTPATIENT
Start: 2019-05-28 | End: 2019-05-28

## 2019-05-28 RX ADMIN — DEXAMETHASONE SODIUM PHOSPHATE 10 MG: 4 INJECTION, SOLUTION INTRAMUSCULAR; INTRAVENOUS at 07:13

## 2019-05-28 ASSESSMENT — PAIN DESCRIPTION - PAIN TYPE: TYPE: ACUTE PAIN

## 2019-05-28 ASSESSMENT — PAIN DESCRIPTION - ORIENTATION: ORIENTATION: ANTERIOR

## 2019-05-28 ASSESSMENT — ENCOUNTER SYMPTOMS
RESPIRATORY NEGATIVE: 1
TROUBLE SWALLOWING: 1
GASTROINTESTINAL NEGATIVE: 1
SORE THROAT: 1

## 2019-05-28 ASSESSMENT — PAIN SCALES - GENERAL
PAINLEVEL_OUTOF10: 7
PAINLEVEL_OUTOF10: 7

## 2019-05-28 ASSESSMENT — PAIN DESCRIPTION - DESCRIPTORS: DESCRIPTORS: CONSTANT

## 2019-05-28 ASSESSMENT — PAIN DESCRIPTION - FREQUENCY: FREQUENCY: CONTINUOUS

## 2019-05-28 ASSESSMENT — PAIN DESCRIPTION - LOCATION: LOCATION: THROAT

## 2019-05-28 ASSESSMENT — PAIN DESCRIPTION - ONSET: ONSET: ON-GOING

## 2019-05-28 NOTE — ED PROVIDER NOTES
[facility]  eMERGENCY dEPARTMENT eNCOUnter    Pt Name: @@  MRN: [id number]  Birthdate1990  Date of evaluation: 5/28/2019  Provider: YENNY Manjarrez CNP     Evaluated by the advance practice provider    [address] [place] [address]       Chief Complaint   Patient presents with    Pharyngitis         HISTORY OF PRESENT ILLNESS  (Location/Symptom, Timing/Onset, Context/Setting, Quality, Duration, Modifying Factors, Severity.)   Renetta Moore is a 25 y.o. female who presents to the emergency department complaining of sore throat this morning upon awakening. Was seen at [facility] emergency department yesterday tested positive for strep throat. Was started on antibiotics. States she came to this emergency Department this morning because she was still having pain and swelling. Nursing Notes were reviewed and I agree. REVIEW OF SYSTEMS    (2-9 systems for level 4, 10 or more for level 5)     Review of Systems   Constitutional: Positive for fever. HENT: Positive for sore throat and trouble swallowing. Respiratory: Negative. Cardiovascular: Negative. Gastrointestinal: Negative. Genitourinary: Negative. Allergic/Immunologic: Negative for immunocompromised state. Neurological: Negative. Except as noted above the remainder of the review of systems was reviewed and negative. PAST MEDICAL HISTORY         Diagnosis Date    Abnormal Pap smear     Colposcopy; HPV.  Abnormal Pap smear of cervix     Colposcopy positive for HPV.  Abnormal umbilical cord     peripheral cord insertion. monitor growth.  ADHD (attention deficit hyperactivity disorder)     no meds currently.  Anxiety     Currently taking Fluoxitine    Asthma     uses daily inhaler and rescue inhaler PRN.  Bipolar 1 disorder (HCC)     was taking prozac early in pregnancy. switched to Quetiapine, Class C, consider weaning in 3rd trimester.     Diabetes mellitus ([facility] [address] Relation Name Status    Mother  Alive    Father  Alive    Sister  (Not Specified)    MGM  (Not Specified)        SOCIAL HISTORY      reports that she has never smoked. She has never used smokeless tobacco. She reports that she does not drink alcohol or use drugs. PHYSICAL EXAM    (up to 7 for level 4, 8 or more for level 5)      ED Triage Vitals [05/28/19 0706]   BP Temp Temp Source Pulse Resp SpO2 Height Weight   117/79 98.3 °F (36.8 °C) Oral 110 16 97 % -- 190 lb 11.2 oz (86.5 kg)       Physical Exam   Constitutional: She is oriented to person, place, and time. She appears well-developed and well-nourished. She is cooperative. Non-toxic appearance. She does not have a sickly appearance. She does not appear ill. No distress. HENT:   Head: Normocephalic. Mouth/Throat: Uvula is midline and mucous membranes are normal. Oropharyngeal exudate, posterior oropharyngeal edema and posterior oropharyngeal erythema present. No tonsillar abscesses. Tonsils are 2+ on the right. Tonsils are 2+ on the left. Tonsillar exudate. Eyes: Pupils are equal, round, and reactive to light. EOM are normal.   Neck: Normal range of motion. Neck supple. Cardiovascular: Regular rhythm and normal heart sounds. Pulmonary/Chest: Effort normal.   Lymphadenopathy:     She has cervical adenopathy. Neurological: She is alert and oriented to person, place, and time. Skin: Skin is warm and dry. Capillary refill takes less than 2 seconds. She is not diaphoretic. Nursing note and vitals reviewed.         DIAGNOSTIC RESULTS     RADIOLOGY:   Non-plain film images such as CT, Ultrasound and MRI are read by the radiologist. Plain radiographic images are visualized and preliminarily interpreted by YENNY George - MARIELLE with the below findings:        Interpretation per the Radiologist below, if available at the time of this note:    No orders to display       LABS:  Labs Reviewed - No data to display    All other labs were within normal range or not returned as of this dictation. EMERGENCY DEPARTMENT COURSE and DIFFERENTIAL DIAGNOSIS/MDM:   Vitals:    Vitals:    05/28/19 0706   BP: 117/79   Pulse: 110   Resp: 16   Temp: 98.3 °F (36.8 °C)   TempSrc: Oral   SpO2: 97%   Weight: 190 lb 11.2 oz (86.5 kg)       MDM    Medications   Dexamethasone Sodium Phosphate injection 10 mg (10 mg Oral Given 5/28/19 0713)     Patient was seen and evaluated per myself. Dr. Samuel Nicolas was present and available for consultation as needed. Clinical exam the patient is afebrile able to speak and articulate. Voice is not muffled. Airway is patent. Uvula midline. No deviation. Posterior pharynx is positive for erythema exudate and edema. There is minimal if any reactive cervical lymphadenopathy. Tongue is midline. No evidence of Jeffrey angina. She is Mollie tachycardic but afebrile. She is immunocompetent. She has not been using Tylenol. She is not been using the hot water gargles or Cepacol throat lozenges. She is only been on antibiotics for less than 24 hours. I do not see an indication that would want hospitalization or IV antibiotics or any further workup at this time. She'll be given a dose of Decadron and she is encouraged to follow-up with ear nose and throat and try the other adjuncts for pain and discomfort. Patient verbalized understanding and she's discharged home in good condition. PROCEDURES:  Procedures    FINAL IMPRESSION      1.  Strep pharyngitis          DISPOSITION/PLAN   DISPOSITION Decision To Discharge 05/28/2019 07:08:02 AM      PATIENT REFERRED TO:  Gary Ross MD  [address] [place] [address]  [phone]    Schedule an appointment as soon as possible for a visit       [facility] [place] [place] ENT  [phone]  Schedule an appointment as soon as possible for a visit   As needed, If symptoms worsen      DISCHARGE MEDICATIONS:  Current Discharge Medication List          (Please note that portions of this note werecompleted with a voice recognition program.  Efforts were made to edit the dictations but occasionally words are mis-transcribed.)    Elinore Bloch Puthoff, [address] [facility]  05/28/19 0707

## 2019-06-13 ENCOUNTER — OFFICE VISIT (OUTPATIENT)
Dept: FAMILY MEDICINE CLINIC | Age: 29
End: 2019-06-13
Payer: COMMERCIAL

## 2019-06-13 VITALS
WEIGHT: 189 LBS | TEMPERATURE: 98.6 F | DIASTOLIC BLOOD PRESSURE: 82 MMHG | HEIGHT: 66 IN | HEART RATE: 111 BPM | BODY MASS INDEX: 30.37 KG/M2 | SYSTOLIC BLOOD PRESSURE: 118 MMHG | RESPIRATION RATE: 16 BRPM

## 2019-06-13 DIAGNOSIS — J02.0 ACUTE STREPTOCOCCAL PHARYNGITIS: Primary | ICD-10-CM

## 2019-06-13 LAB — S PYO AG THROAT QL: POSITIVE

## 2019-06-13 PROCEDURE — G8417 CALC BMI ABV UP PARAM F/U: HCPCS | Performed by: FAMILY MEDICINE

## 2019-06-13 PROCEDURE — G8427 DOCREV CUR MEDS BY ELIG CLIN: HCPCS | Performed by: FAMILY MEDICINE

## 2019-06-13 PROCEDURE — 1036F TOBACCO NON-USER: CPT | Performed by: FAMILY MEDICINE

## 2019-06-13 PROCEDURE — 87880 STREP A ASSAY W/OPTIC: CPT | Performed by: FAMILY MEDICINE

## 2019-06-13 PROCEDURE — 99213 OFFICE O/P EST LOW 20 MIN: CPT | Performed by: FAMILY MEDICINE

## 2019-06-13 RX ORDER — AMOXICILLIN 500 MG/1
500 CAPSULE ORAL 3 TIMES DAILY
Qty: 30 CAPSULE | Refills: 0 | Status: SHIPPED | OUTPATIENT
Start: 2019-06-13 | End: 2019-06-23

## 2019-06-13 NOTE — PATIENT INSTRUCTIONS
INSTRUCTIONS  Please finish taking ALL of the antibiotics. May take ibuprofen (Motrin, Advil) 200 mg tabs up to 4 tabs every 8 hours. May also take acetaminophen (Tylenol) as instructed on packaging.

## 2019-06-13 NOTE — PROGRESS NOTES
(SEROQUEL XR) 300 MG extended release tablet TAKE ONE TABLET BY MOUTH DAILY Yes Ene Le MD   omeprazole (PRILOSEC) 20 MG delayed release capsule TAKE ONE CAPSULE BY MOUTH DAILY Yes Ene Le MD      Family History   Problem Relation Age of Onset    Mental Illness Father         bipolar    Heart Disease Father 43        MI x 3    Cancer Sister         cervical    Heart Failure Maternal Grandmother     Asthma Maternal Grandmother      Social History     Tobacco Use    Smoking status: Never Smoker    Smokeless tobacco: Never Used    Tobacco comment: congratulated on nonsmoking status.    Substance Use Topics    Alcohol use: No     Alcohol/week: 0.0 oz    Drug use: No      LAST LABS  Cholesterol, Total   Date Value Ref Range Status   08/06/2015 148 0 - 199 mg/dL Final     LDL Calculated   Date Value Ref Range Status   08/06/2015 93 <100 mg/dL Final     HDL   Date Value Ref Range Status   08/06/2015 41 40 - 60 mg/dL Final     Triglycerides   Date Value Ref Range Status   08/06/2015 69 0 - 150 mg/dL Final     Lab Results   Component Value Date    GLUCOSE 136 (H) 04/16/2018     Lab Results   Component Value Date     04/16/2018    K 4.2 04/16/2018    CREATININE <0.5 (L) 04/16/2018     Lab Results   Component Value Date    WBC 6.0 01/14/2019    HGB 15.3 01/14/2019    HCT 46.9 01/14/2019    MCV 86.8 01/14/2019     01/14/2019     Lab Results   Component Value Date    ALT 23 04/16/2018    AST 16 04/16/2018    ALKPHOS 62 04/16/2018    BILITOT <0.2 04/16/2018     TSH (uIU/mL)   Date Value   10/10/2018 0.79     Lab Results   Component Value Date    LABA1C 5.4 09/15/2017      Objective:    PHYSICAL EXAM   /82 (Site: Right Upper Arm, Position: Sitting, Cuff Size: Large Adult)   Pulse 111   Temp 98.6 °F (37 °C) (Oral)   Resp 16   Ht 5' 6\" (1.676 m)   Wt 189 lb (85.7 kg)   LMP 05/12/2019   BMI 30.51 kg/m²   BP Readings from Last 5 Encounters:   06/13/19 118/82   05/28/19 117/79   05/27/19 117/86   05/23/19 116/80   04/18/19 118/80     Wt Readings from Last 5 Encounters:   06/13/19 189 lb (85.7 kg)   05/28/19 190 lb 11.2 oz (86.5 kg)   05/27/19 189 lb 2.5 oz (85.8 kg)   05/23/19 188 lb (85.3 kg)   04/18/19 189 lb (85.7 kg)      GENERAL:   · well-developed, well-nourished, alert, no distress. EYES:   · External findings: lids and lashes normal and conjunctivae and sclerae normal  · Eyes: no periorbital cellulitis. ENT:   · External nose and ears appear normal  · normal TM's and external ear canals both ears  · Pharynx: normal. Exudates: None  · Lips, mucosa, and tongue normal   · Hearing grossly normal.     NECK:   · No adenopathy, supple, symmetrical, trachea midline  · Thyroid not enlarged, symmetric, no tenderness/mass/nodules  LUNGS:    · Breathing unlabored  · clear to auscultation bilaterally and good air movement  CARDIOVASC:   · regular rate and rhythm, S1, S2 normal. No murmurs noted. SKIN: warm and dry  · No rashes or suspicious lesions  · No nodules or induration     Assessment and Plan:      Diagnosis Orders   1. Acute streptococcal pharyngitis  amoxicillin (AMOXIL) 500 MG capsule   Plan below. INSTRUCTIONS  Please finish taking ALL of the antibiotics. May take ibuprofen (Motrin, Advil) 200 mg tabs up to 4 tabs every 8 hours. May also take acetaminophen (Tylenol) as instructed on packaging.

## 2019-06-19 ENCOUNTER — TELEPHONE (OUTPATIENT)
Dept: FAMILY MEDICINE CLINIC | Age: 29
End: 2019-06-19

## 2019-06-19 NOTE — TELEPHONE ENCOUNTER
Called pt and got HOP first and last name and information to send to dr Glenn Hess he is a dr Glenn Hess pt

## 2019-09-25 ENCOUNTER — OFFICE VISIT (OUTPATIENT)
Dept: FAMILY MEDICINE CLINIC | Age: 29
End: 2019-09-25
Payer: COMMERCIAL

## 2019-09-25 VITALS
BODY MASS INDEX: 31.18 KG/M2 | WEIGHT: 194 LBS | SYSTOLIC BLOOD PRESSURE: 118 MMHG | HEIGHT: 66 IN | RESPIRATION RATE: 16 BRPM | DIASTOLIC BLOOD PRESSURE: 68 MMHG | HEART RATE: 99 BPM

## 2019-09-25 DIAGNOSIS — F32.89 ATYPICAL DEPRESSION: ICD-10-CM

## 2019-09-25 DIAGNOSIS — R53.83 MALAISE AND FATIGUE: ICD-10-CM

## 2019-09-25 DIAGNOSIS — R40.0 DAYTIME SOMNOLENCE: ICD-10-CM

## 2019-09-25 DIAGNOSIS — R53.81 MALAISE AND FATIGUE: ICD-10-CM

## 2019-09-25 DIAGNOSIS — E03.9 ACQUIRED HYPOTHYROIDISM: ICD-10-CM

## 2019-09-25 DIAGNOSIS — R73.9 HYPERGLYCEMIA: ICD-10-CM

## 2019-09-25 DIAGNOSIS — Z00.00 WELL ADULT HEALTH CHECK: Primary | ICD-10-CM

## 2019-09-25 DIAGNOSIS — F33.1 MODERATE EPISODE OF RECURRENT MAJOR DEPRESSIVE DISORDER (HCC): ICD-10-CM

## 2019-09-25 DIAGNOSIS — Z86.32 HISTORY OF GESTATIONAL DIABETES: ICD-10-CM

## 2019-09-25 DIAGNOSIS — G47.9 SLEEP DIFFICULTIES: ICD-10-CM

## 2019-09-25 DIAGNOSIS — K21.9 GASTROESOPHAGEAL REFLUX DISEASE WITHOUT ESOPHAGITIS: ICD-10-CM

## 2019-09-25 PROCEDURE — 99395 PREV VISIT EST AGE 18-39: CPT | Performed by: FAMILY MEDICINE

## 2019-09-25 RX ORDER — OMEPRAZOLE 20 MG/1
CAPSULE, DELAYED RELEASE ORAL
Qty: 30 CAPSULE | Refills: 3 | Status: SHIPPED | OUTPATIENT
Start: 2019-09-25 | End: 2019-09-25 | Stop reason: SDUPTHER

## 2019-09-25 RX ORDER — OMEPRAZOLE 40 MG/1
CAPSULE, DELAYED RELEASE ORAL
Qty: 30 CAPSULE | Refills: 5 | Status: SHIPPED | OUTPATIENT
Start: 2019-09-25 | End: 2020-04-27

## 2019-09-25 RX ORDER — LEVOTHYROXINE SODIUM 0.03 MG/1
TABLET ORAL
Qty: 30 TABLET | Refills: 3 | Status: SHIPPED | OUTPATIENT
Start: 2019-09-25 | End: 2020-04-27

## 2019-09-25 RX ORDER — QUETIAPINE 300 MG/1
TABLET, FILM COATED, EXTENDED RELEASE ORAL
Qty: 30 TABLET | Refills: 3 | Status: SHIPPED | OUTPATIENT
Start: 2019-09-25 | End: 2020-02-17

## 2019-09-25 NOTE — PROGRESS NOTES
TABS tablet Take 3 mg by mouth daily Yes Historical Provider, MD   ibuprofen (ADVIL;MOTRIN) 600 MG tablet Take 1 tablet by mouth every 8 hours as needed for Pain Yes Macon Callow., MD      Family History   Problem Relation Age of Onset    Mental Illness Father         bipolar    Heart Disease Father 43        MI x 3    Cancer Sister         cervical    Heart Failure Maternal Grandmother     Asthma Maternal Grandmother      Social History     Tobacco Use    Smoking status: Never Smoker    Smokeless tobacco: Never Used    Tobacco comment: congratulated on nonsmoking status.    Substance Use Topics    Alcohol use: No     Alcohol/week: 0.0 standard drinks    Drug use: No      LAST LABS  Cholesterol, Total   Date Value Ref Range Status   08/06/2015 148 0 - 199 mg/dL Final     LDL Calculated   Date Value Ref Range Status   08/06/2015 93 <100 mg/dL Final     HDL   Date Value Ref Range Status   08/06/2015 41 40 - 60 mg/dL Final     Triglycerides   Date Value Ref Range Status   08/06/2015 69 0 - 150 mg/dL Final     Lab Results   Component Value Date    GLUCOSE 136 (H) 04/16/2018     Lab Results   Component Value Date     04/16/2018    K 4.2 04/16/2018    CREATININE <0.5 (L) 04/16/2018     Lab Results   Component Value Date    WBC 6.0 01/14/2019    HGB 15.3 01/14/2019    HCT 46.9 01/14/2019    MCV 86.8 01/14/2019     01/14/2019     Lab Results   Component Value Date    ALT 23 04/16/2018    AST 16 04/16/2018    ALKPHOS 62 04/16/2018    BILITOT <0.2 04/16/2018     TSH (uIU/mL)   Date Value   10/10/2018 0.79     Lab Results   Component Value Date    LABA1C 5.4 09/15/2017     Objective:   PHYSICAL EXAM   /68 (Site: Right Upper Arm, Position: Sitting, Cuff Size: Large Adult)   Pulse 99   Resp 16   Ht 5' 6\" (1.676 m)   Wt 194 lb (88 kg)   BMI 31.31 kg/m²   BP Readings from Last 5 Encounters:   09/25/19 118/68   06/13/19 118/82   05/28/19 117/79   05/27/19 117/86   05/23/19 116/80     Wt Readings Medicine   5. Sleep difficulties  CBC Auto Differential    Ambulatory referral to Sleep Medicine   6. Hyperglycemia  Hemoglobin A1C    Comprehensive Metabolic Panel   7. History of gestational diabetes     8. Malaise and fatigue  CBC Auto Differential    Comprehensive Metabolic Panel    TSH without Reflex   9. Acquired hypothyroidism  TSH without Reflex    levothyroxine (SYNTHROID) 25 MCG tablet   10. Moderate episode of recurrent major depressive disorder (HCC)  QUEtiapine (SEROQUEL XR) 300 MG extended release tablet   Stable. Plan as above and below. INSTRUCTIONS  · NEXT APPOINTMENT: Please schedule check-up in 6 months. · PLEASE TAKE THIS FORM TO CHECK-OUT WINDOW TO SCHEDULE NEXT VISIT. PLEASE GET FASTING BLOODWORK DRAWN SOON. Lab is on first floor in [address]. Hours Monday to Friday 7 AM to 5 PM.  Take orders with you. · RESULTS- most blood tests back in couple days. We will call you if any problems. If bloodwork good, you will get letter in mail or notified thru [address] (if signed up) within 2 weeks. If you do not, please call office. · See sleep specialist.  · INCREASE prilosec to 40 mg daily.  Use up the 20 mg by taking twice a day

## 2019-10-01 DIAGNOSIS — G47.9 SLEEP DIFFICULTIES: ICD-10-CM

## 2019-10-01 DIAGNOSIS — Z00.00 WELL ADULT HEALTH CHECK: ICD-10-CM

## 2019-10-01 DIAGNOSIS — R73.9 HYPERGLYCEMIA: ICD-10-CM

## 2019-10-01 DIAGNOSIS — F32.89 ATYPICAL DEPRESSION: ICD-10-CM

## 2019-10-01 DIAGNOSIS — K21.9 GASTROESOPHAGEAL REFLUX DISEASE WITHOUT ESOPHAGITIS: ICD-10-CM

## 2019-10-01 DIAGNOSIS — E03.9 ACQUIRED HYPOTHYROIDISM: ICD-10-CM

## 2019-10-01 DIAGNOSIS — R53.81 MALAISE AND FATIGUE: ICD-10-CM

## 2019-10-01 DIAGNOSIS — R53.83 MALAISE AND FATIGUE: ICD-10-CM

## 2019-10-01 LAB
A/G RATIO: 1.6 (ref 1.1–2.2)
ALBUMIN SERPL-MCNC: 4.3 G/DL (ref 3.4–5)
ALP BLD-CCNC: 83 U/L (ref 40–129)
ALT SERPL-CCNC: 15 U/L (ref 10–40)
ANION GAP SERPL CALCULATED.3IONS-SCNC: 13 MMOL/L (ref 3–16)
AST SERPL-CCNC: 14 U/L (ref 15–37)
BASOPHILS ABSOLUTE: 0 K/UL (ref 0–0.2)
BASOPHILS RELATIVE PERCENT: 0.8 %
BILIRUB SERPL-MCNC: <0.2 MG/DL (ref 0–1)
BUN BLDV-MCNC: 8 MG/DL (ref 7–20)
CALCIUM SERPL-MCNC: 9.2 MG/DL (ref 8.3–10.6)
CHLORIDE BLD-SCNC: 102 MMOL/L (ref 99–110)
CHOLESTEROL, TOTAL: 175 MG/DL (ref 0–199)
CO2: 24 MMOL/L (ref 21–32)
CREAT SERPL-MCNC: <0.5 MG/DL (ref 0.6–1.1)
EOSINOPHILS ABSOLUTE: 0.2 K/UL (ref 0–0.6)
EOSINOPHILS RELATIVE PERCENT: 3.6 %
ESTIMATED AVERAGE GLUCOSE: 105.4 MG/DL
GFR AFRICAN AMERICAN: >60
GFR NON-AFRICAN AMERICAN: >60
GLOBULIN: 2.7 G/DL
GLUCOSE BLD-MCNC: 106 MG/DL (ref 70–99)
HBA1C MFR BLD: 5.3 %
HCT VFR BLD CALC: 43.4 % (ref 36–48)
HDLC SERPL-MCNC: 42 MG/DL (ref 40–60)
HEMOGLOBIN: 14.5 G/DL (ref 12–16)
LDL CHOLESTEROL CALCULATED: ABNORMAL MG/DL
LDL CHOLESTEROL DIRECT: 109 MG/DL
LYMPHOCYTES ABSOLUTE: 2 K/UL (ref 1–5.1)
LYMPHOCYTES RELATIVE PERCENT: 34.4 %
MCH RBC QN AUTO: 29.4 PG (ref 26–34)
MCHC RBC AUTO-ENTMCNC: 33.4 G/DL (ref 31–36)
MCV RBC AUTO: 87.9 FL (ref 80–100)
MONOCYTES ABSOLUTE: 0.4 K/UL (ref 0–1.3)
MONOCYTES RELATIVE PERCENT: 7.3 %
NEUTROPHILS ABSOLUTE: 3.1 K/UL (ref 1.7–7.7)
NEUTROPHILS RELATIVE PERCENT: 53.9 %
PDW BLD-RTO: 13.6 % (ref 12.4–15.4)
PLATELET # BLD: 229 K/UL (ref 135–450)
PMV BLD AUTO: 9.2 FL (ref 5–10.5)
POTASSIUM SERPL-SCNC: 4.4 MMOL/L (ref 3.5–5.1)
RBC # BLD: 4.94 M/UL (ref 4–5.2)
SODIUM BLD-SCNC: 139 MMOL/L (ref 136–145)
TOTAL PROTEIN: 7 G/DL (ref 6.4–8.2)
TRIGL SERPL-MCNC: 301 MG/DL (ref 0–150)
TSH SERPL DL<=0.05 MIU/L-ACNC: 1.92 UIU/ML (ref 0.27–4.2)
VLDLC SERPL CALC-MCNC: ABNORMAL MG/DL
WBC # BLD: 5.7 K/UL (ref 4–11)

## 2019-10-03 ENCOUNTER — TELEPHONE (OUTPATIENT)
Dept: FAMILY MEDICINE CLINIC | Age: 29
End: 2019-10-03

## 2019-10-08 ENCOUNTER — OFFICE VISIT (OUTPATIENT)
Dept: SLEEP MEDICINE | Age: 29
End: 2019-10-08
Payer: COMMERCIAL

## 2019-10-08 VITALS
DIASTOLIC BLOOD PRESSURE: 84 MMHG | HEIGHT: 66 IN | OXYGEN SATURATION: 96 % | SYSTOLIC BLOOD PRESSURE: 118 MMHG | BODY MASS INDEX: 31.82 KG/M2 | WEIGHT: 198 LBS | RESPIRATION RATE: 16 BRPM | HEART RATE: 94 BPM

## 2019-10-08 DIAGNOSIS — E66.9 OBESITY (BMI 30.0-34.9): ICD-10-CM

## 2019-10-08 DIAGNOSIS — G47.53 SLEEP PARALYSIS, RECURRENT ISOLATED: ICD-10-CM

## 2019-10-08 DIAGNOSIS — G47.33 OSA (OBSTRUCTIVE SLEEP APNEA): Primary | ICD-10-CM

## 2019-10-08 PROCEDURE — G8427 DOCREV CUR MEDS BY ELIG CLIN: HCPCS | Performed by: PSYCHIATRY & NEUROLOGY

## 2019-10-08 PROCEDURE — G8484 FLU IMMUNIZE NO ADMIN: HCPCS | Performed by: PSYCHIATRY & NEUROLOGY

## 2019-10-08 PROCEDURE — G8417 CALC BMI ABV UP PARAM F/U: HCPCS | Performed by: PSYCHIATRY & NEUROLOGY

## 2019-10-08 PROCEDURE — 99204 OFFICE O/P NEW MOD 45 MIN: CPT | Performed by: PSYCHIATRY & NEUROLOGY

## 2019-10-08 PROCEDURE — 1036F TOBACCO NON-USER: CPT | Performed by: PSYCHIATRY & NEUROLOGY

## 2019-10-08 ASSESSMENT — SLEEP AND FATIGUE QUESTIONNAIRES
NECK CIRCUMFERENCE (INCHES): 14.5
HOW LIKELY ARE YOU TO NOD OFF OR FALL ASLEEP WHILE WATCHING TV: 2
HOW LIKELY ARE YOU TO NOD OFF OR FALL ASLEEP WHILE SITTING QUIETLY AFTER LUNCH WITHOUT ALCOHOL: 2
HOW LIKELY ARE YOU TO NOD OFF OR FALL ASLEEP WHILE SITTING AND TALKING TO SOMEONE: 0
ESS TOTAL SCORE: 13
HOW LIKELY ARE YOU TO NOD OFF OR FALL ASLEEP WHILE LYING DOWN TO REST IN THE AFTERNOON WHEN CIRCUMSTANCES PERMIT: 3
HOW LIKELY ARE YOU TO NOD OFF OR FALL ASLEEP WHEN YOU ARE A PASSENGER IN A CAR FOR AN HOUR WITHOUT A BREAK: 3
HOW LIKELY ARE YOU TO NOD OFF OR FALL ASLEEP WHILE SITTING AND READING: 2
HOW LIKELY ARE YOU TO NOD OFF OR FALL ASLEEP WHILE SITTING INACTIVE IN A PUBLIC PLACE: 1
HOW LIKELY ARE YOU TO NOD OFF OR FALL ASLEEP IN A CAR, WHILE STOPPED FOR A FEW MINUTES IN TRAFFIC: 0

## 2019-10-08 ASSESSMENT — ENCOUNTER SYMPTOMS
CHOKING: 1
ALLERGIC/IMMUNOLOGIC NEGATIVE: 1
GASTROINTESTINAL NEGATIVE: 1
EYES NEGATIVE: 1

## 2019-10-09 ENCOUNTER — HOSPITAL ENCOUNTER (OUTPATIENT)
Dept: SLEEP CENTER | Age: 29
Discharge: HOME | End: 2019-10-09
Payer: COMMERCIAL

## 2019-10-09 DIAGNOSIS — G47.33 OSA (OBSTRUCTIVE SLEEP APNEA): ICD-10-CM

## 2019-10-09 DIAGNOSIS — E66.9 OBESITY (BMI 30.0-34.9): ICD-10-CM

## 2019-10-09 PROCEDURE — 95810 POLYSOM 6/> YRS 4/> PARAM: CPT | Performed by: PSYCHIATRY & NEUROLOGY

## 2019-10-09 PROCEDURE — 95810 POLYSOM 6/> YRS 4/> PARAM: CPT

## 2019-10-14 ENCOUNTER — TELEPHONE (OUTPATIENT)
Dept: PULMONOLOGY | Age: 29
End: 2019-10-14

## 2019-10-26 ENCOUNTER — HOSPITAL ENCOUNTER (OUTPATIENT)
Dept: SLEEP CENTER | Age: 29
Discharge: HOME | End: 2019-10-26
Payer: COMMERCIAL

## 2019-10-26 DIAGNOSIS — G47.33 OSA (OBSTRUCTIVE SLEEP APNEA): ICD-10-CM

## 2019-10-26 PROCEDURE — 95811 POLYSOM 6/>YRS CPAP 4/> PARM: CPT

## 2019-10-26 PROCEDURE — 95811 POLYSOM 6/>YRS CPAP 4/> PARM: CPT | Performed by: PSYCHIATRY & NEUROLOGY

## 2019-10-29 ENCOUNTER — TELEPHONE (OUTPATIENT)
Dept: SLEEP MEDICINE | Age: 29
End: 2019-10-29

## 2019-10-29 DIAGNOSIS — J45.20 ASTHMA, MILD INTERMITTENT, WELL-CONTROLLED: ICD-10-CM

## 2019-12-16 ENCOUNTER — OFFICE VISIT (OUTPATIENT)
Dept: SLEEP MEDICINE | Age: 29
End: 2019-12-16
Payer: COMMERCIAL

## 2019-12-16 VITALS
DIASTOLIC BLOOD PRESSURE: 89 MMHG | BODY MASS INDEX: 32.47 KG/M2 | RESPIRATION RATE: 16 BRPM | HEART RATE: 100 BPM | SYSTOLIC BLOOD PRESSURE: 130 MMHG | OXYGEN SATURATION: 97 % | HEIGHT: 66 IN | WEIGHT: 202 LBS

## 2019-12-16 DIAGNOSIS — G47.33 OSA ON CPAP: Primary | ICD-10-CM

## 2019-12-16 DIAGNOSIS — Z99.89 OSA ON CPAP: Primary | ICD-10-CM

## 2019-12-16 DIAGNOSIS — Z99.89 DEPENDENCE ON OTHER ENABLING MACHINES AND DEVICES: ICD-10-CM

## 2019-12-16 PROCEDURE — G8484 FLU IMMUNIZE NO ADMIN: HCPCS | Performed by: PSYCHIATRY & NEUROLOGY

## 2019-12-16 PROCEDURE — 1036F TOBACCO NON-USER: CPT | Performed by: PSYCHIATRY & NEUROLOGY

## 2019-12-16 PROCEDURE — G8427 DOCREV CUR MEDS BY ELIG CLIN: HCPCS | Performed by: PSYCHIATRY & NEUROLOGY

## 2019-12-16 PROCEDURE — 99213 OFFICE O/P EST LOW 20 MIN: CPT | Performed by: PSYCHIATRY & NEUROLOGY

## 2019-12-16 PROCEDURE — G8417 CALC BMI ABV UP PARAM F/U: HCPCS | Performed by: PSYCHIATRY & NEUROLOGY

## 2019-12-16 ASSESSMENT — SLEEP AND FATIGUE QUESTIONNAIRES
HOW LIKELY ARE YOU TO NOD OFF OR FALL ASLEEP IN A CAR, WHILE STOPPED FOR A FEW MINUTES IN TRAFFIC: 0
HOW LIKELY ARE YOU TO NOD OFF OR FALL ASLEEP WHEN YOU ARE A PASSENGER IN A CAR FOR AN HOUR WITHOUT A BREAK: 3
HOW LIKELY ARE YOU TO NOD OFF OR FALL ASLEEP WHILE SITTING AND READING: 2
HOW LIKELY ARE YOU TO NOD OFF OR FALL ASLEEP WHILE SITTING AND TALKING TO SOMEONE: 0
HOW LIKELY ARE YOU TO NOD OFF OR FALL ASLEEP WHILE SITTING QUIETLY AFTER LUNCH WITHOUT ALCOHOL: 2
HOW LIKELY ARE YOU TO NOD OFF OR FALL ASLEEP WHILE SITTING INACTIVE IN A PUBLIC PLACE: 1
HOW LIKELY ARE YOU TO NOD OFF OR FALL ASLEEP WHILE WATCHING TV: 3
ESS TOTAL SCORE: 14
HOW LIKELY ARE YOU TO NOD OFF OR FALL ASLEEP WHILE LYING DOWN TO REST IN THE AFTERNOON WHEN CIRCUMSTANCES PERMIT: 3

## 2019-12-16 ASSESSMENT — ENCOUNTER SYMPTOMS
CHOKING: 0
APNEA: 0

## 2020-01-28 ENCOUNTER — OFFICE VISIT (OUTPATIENT)
Dept: SLEEP MEDICINE | Age: 30
End: 2020-01-28
Payer: COMMERCIAL

## 2020-01-28 VITALS
RESPIRATION RATE: 18 BRPM | HEIGHT: 66 IN | HEART RATE: 99 BPM | SYSTOLIC BLOOD PRESSURE: 114 MMHG | WEIGHT: 203 LBS | OXYGEN SATURATION: 97 % | BODY MASS INDEX: 32.62 KG/M2 | DIASTOLIC BLOOD PRESSURE: 90 MMHG

## 2020-01-28 PROCEDURE — 99213 OFFICE O/P EST LOW 20 MIN: CPT | Performed by: PSYCHIATRY & NEUROLOGY

## 2020-01-28 PROCEDURE — G8484 FLU IMMUNIZE NO ADMIN: HCPCS | Performed by: PSYCHIATRY & NEUROLOGY

## 2020-01-28 PROCEDURE — G8427 DOCREV CUR MEDS BY ELIG CLIN: HCPCS | Performed by: PSYCHIATRY & NEUROLOGY

## 2020-01-28 PROCEDURE — 1036F TOBACCO NON-USER: CPT | Performed by: PSYCHIATRY & NEUROLOGY

## 2020-01-28 PROCEDURE — G8417 CALC BMI ABV UP PARAM F/U: HCPCS | Performed by: PSYCHIATRY & NEUROLOGY

## 2020-01-28 ASSESSMENT — SLEEP AND FATIGUE QUESTIONNAIRES
HOW LIKELY ARE YOU TO NOD OFF OR FALL ASLEEP WHILE LYING DOWN TO REST IN THE AFTERNOON WHEN CIRCUMSTANCES PERMIT: 2
HOW LIKELY ARE YOU TO NOD OFF OR FALL ASLEEP WHILE SITTING AND TALKING TO SOMEONE: 0
HOW LIKELY ARE YOU TO NOD OFF OR FALL ASLEEP WHILE WATCHING TV: 1
HOW LIKELY ARE YOU TO NOD OFF OR FALL ASLEEP IN A CAR, WHILE STOPPED FOR A FEW MINUTES IN TRAFFIC: 0
HOW LIKELY ARE YOU TO NOD OFF OR FALL ASLEEP WHILE SITTING AND READING: 0
ESS TOTAL SCORE: 4
HOW LIKELY ARE YOU TO NOD OFF OR FALL ASLEEP WHILE SITTING QUIETLY AFTER LUNCH WITHOUT ALCOHOL: 0
HOW LIKELY ARE YOU TO NOD OFF OR FALL ASLEEP WHEN YOU ARE A PASSENGER IN A CAR FOR AN HOUR WITHOUT A BREAK: 1
HOW LIKELY ARE YOU TO NOD OFF OR FALL ASLEEP WHILE SITTING INACTIVE IN A PUBLIC PLACE: 0

## 2020-01-28 ASSESSMENT — ENCOUNTER SYMPTOMS: APNEA: 0

## 2020-01-28 NOTE — PATIENT INSTRUCTIONS
Some machines have air pressure that adjusts on its own. Others have air pressures that are different when you breathe in than when you breathe out. This may reduce discomfort caused by too much pressure in your nose. Where can you learn more? Go to [email or web address] org and sign in to your yoone account. Enter Z164 in the [employer]enue box to learn more about \"Learning About CPAP for Sleep Apnea. \"     If you do not have an account, please click on the \"Sign Up Now\" link. Current as of: June 9, 2019  Content Version: 12.3  © 8913-0038 Healthwise, Incorporated. Care instructions adapted under license by [facility] ([facility]). If you have questions about a medical condition or this instruction, always ask your healthcare professional. [address] any warranty or liability for your use of this information.

## 2020-01-28 NOTE — PROGRESS NOTES
nonsmoking status. Substance and Sexual Activity    Alcohol use: No     Alcohol/week: 0.0 standard drinks    Drug use: No    Sexual activity: Yes     Partners: Male     Birth control/protection: I.U.D. Lifestyle    Physical activity:     Days per week: Not on file     Minutes per session: Not on file    Stress: Not on file   Relationships    Social connections:     Talks on phone: Not on file     Gets together: Not on file     Attends Yazidi service: Not on file     Active member of club or organization: Not on file     Attends meetings of clubs or organizations: Not on file     Relationship status: Not on file    Intimate partner violence:     Fear of current or ex partner: Not on file     Emotionally abused: Not on file     Physically abused: Not on file     Forced sexual activity: Not on file   Other Topics Concern    Not on file   Social History Narrative    Exercise: walks 7 time(s) per week    Seatbelt use: Always    Sexual activity: single partner, contraception - IUD             Prior to Admission medications    Medication Sig Start Date End Date Taking?  Authorizing Provider   PROAIR  (40 Base) MCG/ACT inhaler INHALE TWO PUFFS BY MOUTH DAILY AS NEEDED FOR WHEEZING OR SHORTNESS OF BREATH 10/31/19  Yes Mirian Echeverria MD   levothyroxine (SYNTHROID) 25 MCG tablet Take 1 tablet daily 9/25/19  Yes Mirian Echeverria MD   QUEtiapine (SEROQUEL XR) 300 MG extended release tablet TAKE ONE TABLET BY MOUTH DAILY 9/25/19  Yes Mirian Echeverria MD   omeprazole (PRILOSEC) 40 MG delayed release capsule TAKE ONE CAPSULE BY MOUTH DAILY 9/25/19  Yes Mirian Echeverria MD   melatonin 3 MG TABS tablet Take 3 mg by mouth daily   Yes Historical Provider, MD   ibuprofen (ADVIL;MOTRIN) 600 MG tablet Take 1 tablet by mouth every 8 hours as needed for Pain  Patient not taking: Reported on 1/28/2020 5/27/19   Marcus Elkins MD       Allergies as of 01/28/2020 - Review Complete 01/28/2020   Allergen Reaction Noted    Latex trimester 2012    with G1. Baseline PIH labs and 24 hour urine WNL with G2.    Thyroid disease     Hypothyroid. taking Synthroid. Past Surgical History:   Procedure Laterality Date    BREAST SURGERY  2007    reduction     SECTION      LTCS for suspected macrosomia and PIH. Family History   Problem Relation Age of Onset    Mental Illness Father         bipolar    Heart Disease Father 43        MI x 3    Cancer Sister         cervical    Heart Failure Maternal Grandmother     Asthma Maternal Grandmother        Review of Systems   Constitutional: Negative for fatigue. Respiratory: Negative for apnea. Cardiovascular: Negative for leg swelling. Genitourinary: Negative for frequency. Objective:     Vitals:  Weight BMI Neck circumference    Wt Readings from Last 3 Encounters:   20 203 lb (92.1 kg)   19 202 lb (91.6 kg)   10/08/19 198 lb (89.8 kg)    Body mass index is 32.77 kg/m². BP HR SaO2   BP Readings from Last 3 Encounters:   20 (!) 140/96   19 130/89   10/08/19 118/84    Pulse Readings from Last 3 Encounters:   20 99   19 100   10/08/19 94    SpO2 Readings from Last 3 Encounters:   20 97%   19 97%   10/08/19 96%      Themandibular molar Class :   [x]1 []2 []3      Mallampati I Airway Classification:   []1 []2 [x]3 []4      Physical Exam  Vitals signs and nursing note reviewed. Constitutional:       Appearance: Normal appearance. HENT:      Head: Atraumatic. Nose: Nose normal.      Mouth/Throat:      Mouth: Mucous membranes are moist.   Eyes:      Extraocular Movements: Extraocular movements intact. Neck:      Musculoskeletal: Normal range of motion and neck supple. Cardiovascular:      Rate and Rhythm: Normal rate and regular rhythm. Heart sounds: Normal heart sounds. Pulmonary:      Effort: Pulmonary effort is normal.      Breath sounds: Normal breath sounds.    Musculoskeletal: Normal range of motion. General: No swelling. Skin:     General: Skin is warm. Neurological:      General: No focal deficit present. Psychiatric:         Mood and Affect: Mood normal.         Assessment:   Mild Obstructive Sleep Apnea/Hypopnea Syndrome under good control on PAP at 9.3 cmwp. Diagnosis Orders   1. MARV on CPAP     2. Dependence on other enabling machines and devices       Plan: Will continue the APAP at 9 and 11 cmwp, I will order PAP supplies, mask, filters. ... Weight loss      No orders of the defined types were placed in this encounter. Return in about 1 year (around 1/28/2021) for Reveiwing CPAP usage and compliance report and tro.     Reji Hill MD  Medical Director - [facility]

## 2020-02-17 RX ORDER — QUETIAPINE 300 MG/1
TABLET, FILM COATED, EXTENDED RELEASE ORAL
Qty: 30 TABLET | Refills: 2 | Status: SHIPPED | OUTPATIENT
Start: 2020-02-17 | End: 2020-05-14

## 2020-03-30 RX ORDER — ALBUTEROL SULFATE 90 UG/1
2 AEROSOL, METERED RESPIRATORY (INHALATION) DAILY PRN
Qty: 1 INHALER | Refills: 3 | Status: SHIPPED | OUTPATIENT
Start: 2020-03-30 | End: 2020-04-29 | Stop reason: SDUPTHER

## 2020-03-31 ENCOUNTER — TELEPHONE (OUTPATIENT)
Dept: FAMILY MEDICINE CLINIC | Age: 30
End: 2020-03-31

## 2020-04-01 ENCOUNTER — TELEMEDICINE (OUTPATIENT)
Dept: FAMILY MEDICINE CLINIC | Age: 30
End: 2020-04-01
Payer: COMMERCIAL

## 2020-04-01 PROCEDURE — G8427 DOCREV CUR MEDS BY ELIG CLIN: HCPCS | Performed by: FAMILY MEDICINE

## 2020-04-01 PROCEDURE — 99213 OFFICE O/P EST LOW 20 MIN: CPT | Performed by: FAMILY MEDICINE

## 2020-04-01 NOTE — PROGRESS NOTES
UPPER RESPIRATORY VISIT  Subjective:      Chief Complaint   Patient presents with   Pedro Lauri Severino is a 30 y.o. female who presents for evaluation of symptoms of URI. Onset of symptoms was 1 day ago. Symptoms include right ear pressure/pain, non productive cough, shortness of breath and can't catch her breathe and used rescue inhaler 6 times yesterday  and are gradually worsening since that time. Other symptoms: extreme fatigue. When she breathes really deep it feels like pins and needles     Denies: low grade fever, sinus pressure, sneezing and wheezing. Tried OTC: albuterol    Review of Systems   General ROS: fever? No,    Night sweats? No  Ophthalmic ROS: change in vision? No  Endocrine ROS: fatigue? Yes   Unexpected weight changes? No  Hematological and Lymphatic ROS: easy bruising? No   Swollen lymph nodes? No  ENT ROS: headaches? Yes   Sore throat? No  Respiratory ROS: cough? Yes   Wheezing? No  Cardiovascular ROS: chest pain? No   Shortness of breath? Yes  Gastrointestinal ROS: abdominal pain? No   Change in stools? No  TELEHEALTH EVALUATION -- Audio/Visual (During [facility]- public health emergency)    *Chief complaint, HPI and History provided by the medical assistant has been reviewed and verified by provider Amee Bunn MD    HISTORY:  Patient's medications, allergies, past medical, and social histories were reviewed and updated as appropriate.      CHART REVIEW  Health Maintenance   Topic Date Due    Varicella vaccine (1 of 2 - 2-dose childhood series) 07/04/1989    Cervical cancer screen  02/17/2020    TSH testing  10/01/2020    DTaP/Tdap/Td vaccine (8 - Td) 08/11/2028    Hepatitis B vaccine  Completed    Hib vaccine  Completed    Flu vaccine  Completed    Pneumococcal 0-64 years Vaccine  Completed    HIV screen  Completed    Hepatitis A vaccine  Aged Out    Meningococcal (ACWY) vaccine  Aged Out     The ASCVD Risk score (Rolly Yin., et al., 2013) failed to calculate for

## 2020-04-03 ENCOUNTER — TELEPHONE (OUTPATIENT)
Dept: FAMILY MEDICINE CLINIC | Age: 30
End: 2020-04-03

## 2020-04-03 RX ORDER — PREDNISONE 10 MG/1
TABLET ORAL
Qty: 21 TABLET | Refills: 0 | Status: SHIPPED | OUTPATIENT
Start: 2020-04-03 | End: 2020-04-14 | Stop reason: SDUPTHER

## 2020-04-14 ENCOUNTER — TELEPHONE (OUTPATIENT)
Dept: FAMILY MEDICINE CLINIC | Age: 30
End: 2020-04-14

## 2020-04-14 RX ORDER — PREDNISONE 10 MG/1
TABLET ORAL
Qty: 21 TABLET | Refills: 0 | Status: SHIPPED | OUTPATIENT
Start: 2020-04-14 | End: 2020-07-16

## 2020-04-14 NOTE — TELEPHONE ENCOUNTER
Sent in another round of steroids. Covid seems to be the worst around 2 weeks out. Do not go to ER unless breathing is limiting ability to speak or walk across room. Keep using inhalers.

## 2020-04-20 ENCOUNTER — PATIENT MESSAGE (OUTPATIENT)
Dept: FAMILY MEDICINE CLINIC | Age: 30
End: 2020-04-20

## 2020-04-20 RX ORDER — AZITHROMYCIN 250 MG/1
TABLET, FILM COATED ORAL
Qty: 1 PACKET | Refills: 0 | Status: SHIPPED | OUTPATIENT
Start: 2020-04-20 | End: 2020-04-25

## 2020-04-20 NOTE — TELEPHONE ENCOUNTER
From: Luis Izquierdo  To: Sandra Pena MD  Sent: 4/20/2020 11:42 AM EDT  Subject: Prescription Question    It still hurts to breath. I'm starting to get back pain. I didn't know if you could call me in an antibiotic.

## 2020-04-27 RX ORDER — LEVOTHYROXINE SODIUM 0.03 MG/1
TABLET ORAL
Qty: 30 TABLET | Refills: 2 | Status: SHIPPED | OUTPATIENT
Start: 2020-04-27 | End: 2020-10-08

## 2020-04-27 RX ORDER — OMEPRAZOLE 40 MG/1
CAPSULE, DELAYED RELEASE ORAL
Qty: 30 CAPSULE | Refills: 2 | Status: SHIPPED | OUTPATIENT
Start: 2020-04-27 | End: 2020-09-25 | Stop reason: SDUPTHER

## 2020-04-29 RX ORDER — KETOCONAZOLE 20 MG/ML
SHAMPOO TOPICAL
Qty: 2 BOTTLE | Refills: 3 | Status: SHIPPED | OUTPATIENT
Start: 2020-04-29 | End: 2021-04-28

## 2020-04-29 RX ORDER — ALBUTEROL SULFATE 90 UG/1
2 AEROSOL, METERED RESPIRATORY (INHALATION) DAILY PRN
Qty: 1 INHALER | Refills: 3 | Status: SHIPPED | OUTPATIENT
Start: 2020-04-29 | End: 2020-12-07 | Stop reason: SDUPTHER

## 2020-05-13 ENCOUNTER — TELEPHONE (OUTPATIENT)
Dept: FAMILY MEDICINE CLINIC | Age: 30
End: 2020-05-13

## 2020-05-13 ENCOUNTER — TELEMEDICINE (OUTPATIENT)
Dept: FAMILY MEDICINE CLINIC | Age: 30
End: 2020-05-13
Payer: COMMERCIAL

## 2020-05-13 PROCEDURE — G8427 DOCREV CUR MEDS BY ELIG CLIN: HCPCS | Performed by: FAMILY MEDICINE

## 2020-05-13 PROCEDURE — 99213 OFFICE O/P EST LOW 20 MIN: CPT | Performed by: FAMILY MEDICINE

## 2020-05-14 DIAGNOSIS — R53.81 MALAISE AND FATIGUE: ICD-10-CM

## 2020-05-14 DIAGNOSIS — G62.9 PERIPHERAL POLYNEUROPATHY: ICD-10-CM

## 2020-05-14 DIAGNOSIS — N92.6 IRREGULAR MENSES: ICD-10-CM

## 2020-05-14 DIAGNOSIS — E65 CENTRAL OBESITY: ICD-10-CM

## 2020-05-14 DIAGNOSIS — R53.83 MALAISE AND FATIGUE: ICD-10-CM

## 2020-05-14 LAB
ESTRADIOL LEVEL: 47 PG/ML
FOLLICLE STIMULATING HORMONE: 5.7 MIU/ML
GLUCOSE BLD-MCNC: 106 MG/DL (ref 70–99)
PROLACTIN: 9.5 NG/ML
T3 TOTAL: 1.24 NG/ML (ref 0.8–2)
T4 FREE: 0.9 NG/DL (ref 0.9–1.8)
TSH SERPL DL<=0.05 MIU/L-ACNC: 0.97 UIU/ML (ref 0.27–4.2)
VITAMIN B-12: 593 PG/ML (ref 211–911)
VITAMIN D 25-HYDROXY: 13.8 NG/ML

## 2020-05-14 RX ORDER — QUETIAPINE 300 MG/1
TABLET, FILM COATED, EXTENDED RELEASE ORAL
Qty: 30 TABLET | Refills: 1 | Status: SHIPPED | OUTPATIENT
Start: 2020-05-14 | End: 2020-07-15

## 2020-05-15 ENCOUNTER — TELEPHONE (OUTPATIENT)
Dept: FAMILY MEDICINE CLINIC | Age: 30
End: 2020-05-15

## 2020-05-15 PROBLEM — E55.9 VITAMIN D DEFICIENCY: Status: ACTIVE | Noted: 2020-05-15

## 2020-05-15 NOTE — TELEPHONE ENCOUNTER
Pt wants to know if her labs were normal? She has one lab that could not be done because she wasn't there before ten am, so she is going to get that done Monday.   But the ones that were done she wants to know if they were normal

## 2020-05-15 NOTE — TELEPHONE ENCOUNTER
3 results still pending. All normal so far except very low vitamin D. Please start taking vitamin D 10,000 IU daily. Will recheck in 2 months.

## 2020-05-18 LAB — DHEAS (DHEA SULFATE): 68 UG/DL (ref 45–270)

## 2020-05-20 ENCOUNTER — PATIENT MESSAGE (OUTPATIENT)
Dept: FAMILY MEDICINE CLINIC | Age: 30
End: 2020-05-20

## 2020-05-20 RX ORDER — QUETIAPINE 300 MG/1
TABLET, FILM COATED, EXTENDED RELEASE ORAL
Qty: 30 TABLET | Refills: 1 | OUTPATIENT
Start: 2020-05-20

## 2020-05-21 RX ORDER — ALBUTEROL SULFATE 2.5 MG/3ML
2.5 SOLUTION RESPIRATORY (INHALATION) EVERY 6 HOURS PRN
Qty: 50 VIAL | Refills: 1 | Status: SHIPPED | OUTPATIENT
Start: 2020-05-21 | End: 2021-09-27 | Stop reason: SDUPTHER

## 2020-05-21 NOTE — TELEPHONE ENCOUNTER
From: Bret Goodman  To: Sherman Hooker MD  Sent: 5/20/2020 11:45 PM EDT  Subject: Prescription Question    I need my medicine for nebulizer please

## 2020-05-27 ENCOUNTER — TELEMEDICINE (OUTPATIENT)
Dept: FAMILY MEDICINE CLINIC | Age: 30
End: 2020-05-27
Payer: COMMERCIAL

## 2020-05-27 VITALS — BODY MASS INDEX: 33.75 KG/M2 | WEIGHT: 210 LBS | HEIGHT: 66 IN

## 2020-05-27 LAB — CORTISOL - AM: 16.1 UG/DL (ref 4.3–22.4)

## 2020-05-27 PROCEDURE — 99213 OFFICE O/P EST LOW 20 MIN: CPT | Performed by: FAMILY MEDICINE

## 2020-05-27 PROCEDURE — G8427 DOCREV CUR MEDS BY ELIG CLIN: HCPCS | Performed by: FAMILY MEDICINE

## 2020-05-27 NOTE — PROGRESS NOTES
TELEHEALTH EVALUATION -- Audio/Visual (During [facility]-20 public health emergency)  VIDEO VISIT- patient and provider not at same location  Also present:none. Mood Visit  Subjective:     Chief Complaint   Patient presents with   [address] Javier is a 30 y.o. female who presents for follow up of mood issue. Has been flores and irritable recently. Having nightmares. MA recently Dx cancer  Sleep latency over and hour  Feeling anxious and jumpy and then down and can't get out of bed. HISTORY  Are you working with a psychologist / psychiatrist?  No  Have you felt your symptoms are better, worse, or unchanged since your last visit   worse  Mood is fair. Sleep is poor. Stressors: problems with primary support group, problems related to the social environment and grandmother has stage 4th bladder cancer   Current symptoms include: depressed mood, difficulty concentrating, fatigue, hopelessness, impaired memory and insomnia,difficulty concentrating, fatigue, insomnia, irritable, racing thoughts. Patient denies depression symptoms of: suicidal intention  Patient denies anxiety symptoms of: losing control. Other issues: pt just wants to sit and cry has a whats the point kind of feeling, a lot of nightmares, thinks she may need a med change, she isn't sleeping anymore either     Review of Systems   General ROS: fever? No,    Night sweats? No  Ophthalmic ROS:blurry vision or decreased vision? No  Endocrine ROS:lethargy? Yes   Unexpected weight changes? No  Respiratory ROS: cough? No   Shortness of breath? No  Cardiovascular ROS:chest pain? No   Shortness of breath with exertion? No  Gastrointestinal ROS: abdominal pain? No   Change in stools? No    HISTORY:  Patient's medications, allergies, past medical, and social histories were reviewed and updated as appropriate.     *Chief complaint, HPI, History and ROS provided by the medical assistant has been reviewed and verified by provider- Lucila Samayoa distress. EYES:   · External findings: lids and lashes normal and conjunctivae and sclerae normal  · Eyes: no periorbital cellulitis. HENT:   · Normocephalic, atraumatic  · External nose and ears appear normal  · Mucous membranes are moist  · Hearing grossly normal.     NECK: No visible masses  LUNGS:    · Respiratory effort normal.  · No visualized signs of difficulty breathing or respiratory distress  SKIN: warm and dry  · No significant exanthematous lesions or discoloration noted on facial skin  PSYCH:    · Alert and oriented, able to follow commands  · Normal reasoning, insight good  · Normal affect  · No memory disturbance noted  NEURO:   No Facial Asymmetry (Cranial nerve 7 motor function) (limited exam to video visit)      No gaze palsy      Assessment and Plan:      Diagnosis Orders   1. Atypical depression  Ambulatory referral to Psychology   2. PMS (premenstrual syndrome)     3. Anxiety  Ambulatory referral to Psychology   4. Moderate episode of recurrent major depressive disorder ([facility]     Plan below. INSTRUCTIONS  · NEXT APPOINTMENT: Please schedule check-up in 6 weeks. · Get in with counselling  · Consider psych referral if not better. Virtual Visit (video visit) encounter employed to address concerns as mentioned above. A caregiver was present when appropriate. Due to this being a TeleHealth encounter (During [facility] public health emergency), evaluation of the following organ systems was limited. Pursuant to the emergency declaration under the [address] [place] [address] authority and the [employer] and [employer] General Act, this Virtual Visit was conducted with patient's (and/or legal guardian's) consent, to reduce the patient's risk of exposure to COVID-19 and provide necessary medical care.   The patient (and/or legal guardian) has also been advised to contact this office for worsening conditions or problems, and seek emergency medical treatment and/or call 911 if deemed necessary. Services were provided through a video synchronous discussion virtually to substitute for in-person clinic visit. Patient and provider were located at their individual homes. minutes: 11-20 minutes were spent on the digital evaluation and management of this patient. --Anisha Logan MD on 5/27/2020 at 1:27 PM    An electronic signature was used to authenticate this note.

## 2020-05-29 LAB
INSULIN COMMENT: NORMAL
INSULIN REFERENCE RANGE:: NORMAL
INSULIN: 23.6 MU/L

## 2020-06-03 LAB
ESTRADIOL LEVEL: 184 PG/ML
FOLLICLE STIMULATING HORMONE: 2.5 MIU/ML
TSH SERPL DL<=0.05 MIU/L-ACNC: 1.47 UIU/ML (ref 0.27–4.2)

## 2020-06-04 LAB
HPV COMMENT: NORMAL
HPV TYPE 16: NOT DETECTED
HPV TYPE 18: NOT DETECTED
HPVOH (OTHER TYPES): NOT DETECTED

## 2020-06-05 LAB
SEX HORMONE BINDING GLOBULIN: 33 NMOL/L (ref 30–135)
TESTOSTERONE FREE-NONMALE: 3.7 PG/ML (ref 1.3–9.2)
TESTOSTERONE TOTAL: 21 NG/DL (ref 20–70)

## 2020-06-08 LAB — DHEAS (DHEA SULFATE): 122 UG/DL (ref 45–270)

## 2020-07-15 RX ORDER — QUETIAPINE 300 MG/1
TABLET, FILM COATED, EXTENDED RELEASE ORAL
Qty: 30 TABLET | Refills: 0 | Status: SHIPPED | OUTPATIENT
Start: 2020-07-15 | End: 2020-08-17

## 2020-07-16 ENCOUNTER — OFFICE VISIT (OUTPATIENT)
Dept: FAMILY MEDICINE CLINIC | Age: 30
End: 2020-07-16
Payer: COMMERCIAL

## 2020-07-16 ENCOUNTER — HOSPITAL ENCOUNTER (EMERGENCY)
Age: 30
Discharge: HOME | End: 2020-07-16
Payer: COMMERCIAL

## 2020-07-16 VITALS
OXYGEN SATURATION: 99 % | RESPIRATION RATE: 16 BRPM | BODY MASS INDEX: 33.59 KG/M2 | TEMPERATURE: 97.8 F | HEIGHT: 66 IN | HEART RATE: 104 BPM | SYSTOLIC BLOOD PRESSURE: 122 MMHG | DIASTOLIC BLOOD PRESSURE: 76 MMHG | WEIGHT: 209 LBS

## 2020-07-16 VITALS
DIASTOLIC BLOOD PRESSURE: 82 MMHG | TEMPERATURE: 98 F | HEIGHT: 66 IN | WEIGHT: 209 LBS | BODY MASS INDEX: 33.59 KG/M2 | OXYGEN SATURATION: 99 % | SYSTOLIC BLOOD PRESSURE: 130 MMHG | HEART RATE: 88 BPM | RESPIRATION RATE: 17 BRPM

## 2020-07-16 PROCEDURE — 12001 RPR S/N/AX/GEN/TRNK 2.5CM/<: CPT

## 2020-07-16 PROCEDURE — G8427 DOCREV CUR MEDS BY ELIG CLIN: HCPCS | Performed by: FAMILY MEDICINE

## 2020-07-16 PROCEDURE — G8417 CALC BMI ABV UP PARAM F/U: HCPCS | Performed by: FAMILY MEDICINE

## 2020-07-16 PROCEDURE — 99282 EMERGENCY DEPT VISIT SF MDM: CPT

## 2020-07-16 PROCEDURE — 99213 OFFICE O/P EST LOW 20 MIN: CPT | Performed by: FAMILY MEDICINE

## 2020-07-16 PROCEDURE — 1036F TOBACCO NON-USER: CPT | Performed by: FAMILY MEDICINE

## 2020-07-16 RX ORDER — PREDNISONE 10 MG/1
TABLET ORAL
Qty: 39 TABLET | Refills: 0 | Status: SHIPPED | OUTPATIENT
Start: 2020-07-16 | End: 2020-07-28

## 2020-07-16 ASSESSMENT — ENCOUNTER SYMPTOMS
COLOR CHANGE: 1
ABDOMINAL PAIN: 0
SHORTNESS OF BREATH: 0
VOMITING: 0
DIARRHEA: 0
NAUSEA: 0
CHEST TIGHTNESS: 0

## 2020-07-16 ASSESSMENT — PAIN DESCRIPTION - ORIENTATION: ORIENTATION: RIGHT

## 2020-07-16 ASSESSMENT — PAIN SCALES - GENERAL: PAINLEVEL_OUTOF10: 6

## 2020-07-16 ASSESSMENT — PAIN DESCRIPTION - LOCATION: LOCATION: FINGER (COMMENT WHICH ONE)

## 2020-07-16 ASSESSMENT — PAIN DESCRIPTION - DESCRIPTORS: DESCRIPTORS: THROBBING

## 2020-07-16 NOTE — LETTER
[facility] Emergency Department      [address] [place] [address], [address]            PROOF OF PRESENCE      To Whom It May Concern:    Britt Coulter was present in the Emergency Department at [facility] on 7/16/2020. Sincerely,        IMELDA Hathaway

## 2020-07-16 NOTE — PATIENT INSTRUCTIONS
Take prednisone taper. For allergies, patient may take OTC antihistamines such as Claritin/Allovert/loratidine or Zyrtec/certrizine 10 mg daily. If that does not work, add daily Flonase or Nasacort nasal spray. Patient Education       POISON IVY    If you have any of the following, go to the emergency room right away:  Trouble breathing or swallowing. Rash covers most of your body. You have many rashes or blisters. Swelling, especially if an eyelid swells shut. Rash develops anywhere on your face or genitals. Much of your skin itches or nothing seems to ease the itch. You may be able to treat your rash at home if:  The rash appears on a small section of your skin. You are absolutely certain that your rash is due to poison ivy, poison oak, or poison sumac. If you are not certain, see a dermatologist and do not try to treat the rash at home. The following tips from dermatologists will help you:  Treat a rash at home   Stop the itch   Prevent a rash from poison ivy, oak, or sumac     Treat a rash at home  If you have a rash from poison ivy, poison oak, or poison sumac, dermatologists recommend the followin. Rinse your skin right away with lukewarm water. If you can rinse your skin immediately after touching the poison ivy, oak, or sumac, you may be able to rinse off some of the oil. 2. Wash all of the clothes you were wearing when you came into contact with the poisonous plant. The oil can stick to clothing. If this oil touches your skin, you can get another rash. 3. Wash everything that may have oil on its surface. The oil can stick to many surfaces, including gardening tools, golf clubs, leashes, and even a pets fur. Be sure to rinse your pets fur. Wash tools and other objects with warm, soapy water. Caution:  When treating this rash, dermatologists recommend that you:  Do not apply an antihistamine to your skin.  Applying an antihistamine to your skin can worsen the itch and the rash.   Do not use a hydrocortisone cream or lotion. These relieve only very mild cases. How long does the rash from poison ivy last?  The rash from poison ivy, oak, or sumac usually lasts 1 to 3 weeks. During this time, your skin can itch. Stop the itch  As the skin heals, it can itch. It is best not to scratch. Scratching can cause an infection. To ease the itch at home, dermatologists recommend that their patients try one or more of the following:   Oatmeal bath: Take short lukewarm baths in a colloidal oatmeal preparation, which you can buy at your local drugstore. Baking soda bath: Draw a warm, not hot, bath, and add 1 cup of baking soda to the running water. Calamine lotion: Apply this to skin that itches. Cool showers: Short cool showers can help. Cool compresses: You can make a cool compress by running a clean washcloth under cold water and wringing it out so that it does not drip. Then apply the cool cloth to the itchy skin. Antihistamine pills: These pills can help, but you should not apply an antihistamine to your skin. Doing so can worsen the rash and the itch. Prevent a rash from poison ivy, oak, or sumac  There are two ways to prevent a rash:  1. Avoid these poisonous plants. 2. Protect your skin. The following explains how you can identify these plants so that you can avoid them and how to protect your skin when you cannot avoid these plants. What poison ivy looks like  Each leaf has 3 small leaflets. It grows as a shrub (low woody plant) in the far [place] and [facility], [place] [facility] and around the [facility]. It grows as a vine in the [place], [facility], and [address] [place] [address]. In spring, it grows yellow-green flowers. It may have green berries that turn off-white in early fall. Poison ivy. This plant grows as a vine (pictured) in some areas of the [place]. In other areas, it is a shrub.    What poison oak looks like  Each leaf has 3 small leaflets. It most often grows as a shrub. It can grow as a vine in the [facility]. It may have yellow-white berries. Poison oak. This plant grows as a vine (pictured) in some areas of the [place]. In other regions, it grows as a shrub. What poison sumac looks like  Each leaf has a row of paired leaflets and another leaflet at the end. It grows as a tall shrub or small tree. In the [facility], it grows in standing water in peat bogs. In the [place], it grows in swampy areas. Often the leaves have spots that look like blotches of black paint. These spots are urushiol, which when exposed to air turn brownish black. Before urushiol hits the air, it is clear or a pale yellow. It may have yellow-white berries. Poison sumac. This plant has 7 to 13 leaflets on each leaf. It grows in standing water as a tall shrub or small tree. How to protect your skin from poison ivy, oak, and sumac   Sometimes you cannot avoid these plants. When you find yourself in this situation, there are some precautions you can take:  Use a skin care product called an ivy block barrier. This helps prevent the skin from absorbing the oil (urushiol), which causes the rash. These products usually contain bentoquatam. You can buy these products without a prescription. Be sure to apply the block before going outdoors. Wear long pants, long sleeves, boots, and gloves. Even when you apply an ivy block barrier that contains bentoquatam, you need to cover your skin with clothing. If you find yourself in an area with poison ivy, oak, or sumac, it helps to know the following: All parts of these plants contain urushiol. The leaves, the stems, and even the roots contain urushiol. Touching any part of the plant can cause an allergic reaction. Touching anything that has urushiol on it can cause an allergic reaction.  You can have an allergic reaction from touching gardening tools, sporting equipment, and even a pets fur. Burning these plants releases urushiol into the air. You can have an allergic reaction if airborne particles land on your skin. If you get a rash from poison ivy, poison oak, or poison sumac, you can usually treat the rash at home. If you have a serious reaction, seek immediate medical care by going to the emergency room or calling 911.

## 2020-07-16 NOTE — PROGRESS NOTES
RASH  CHART REVIEW  Subjective:     Chief Complaint   Patient presents with   Nicol Almendarez. is a 26 y.o. female who presents for evaluation of rash involving the ear, face, finger, hand, trunk, upper body, upper extremity and wrist. Rash started 1 day ago. Rash itchy . Associated symptoms: no associated symptoms. Patient denies: fever, sore throat. Patient has not had contacts with similar rash. Patient has had new exposures (soaps, lotions, laundry detergents, foods, medications, plants, insects or animals.) Patient has not had any unusual travels. Review of Systems   General ROS: fever? No,    Night sweats? No  Endocrine ROS: fatigue? No   Unexpected weight changes? No  Hematological and Lymphatic ROS: easy bruising? No   Swollen lymph nodes? No  Respiratory ROS: cough? No   Wheezing? No  Cardiovascular ROS: chest pain? No   Shortness of breath? No  Gastrointestinal ROS: abdominal pain? No   Change in stools? No        *Chief complaint, HPI and History provided by the medical assistant has been reviewed and verified by provider Mayra Sampson MD    HISTORY:  Patient's medications, allergies, past medical, and social histories were reviewed and updated as appropriate. CHART REVIEW  Health Maintenance   Topic Date Due    Varicella vaccine (1 of 2 - 2-dose childhood series) 07/04/1989    Flu vaccine (1) 09/01/2020    TSH testing  06/03/2021    Cervical cancer screen  06/03/2025    DTaP/Tdap/Td vaccine (8 - Td) 08/11/2028    Hepatitis B vaccine  Completed    Hib vaccine  Completed    Pneumococcal 0-64 years Vaccine  Completed    HIV screen  Completed    Hepatitis A vaccine  Aged Out    Meningococcal (ACWY) vaccine  Aged Out     The ASCVD Risk score (Keily Torrez., et al., 2013) failed to calculate for the following reasons: The 2013 ASCVD risk score is only valid for ages 36 to 78  Prior to Visit Medications    Medication Sig Taking?  Authorizing Provider   predniSONE (DELTASONE) 10 MG tablet Take 3 twice a day for 3 days, then 2 twice a day for 3 days, the 1 twice a day for 3 days, then 1 daily for 3 days, then STOP. Yes Claudine Saba MD   QUEtiapine (SEROQUEL XR) 300 MG extended release tablet TAKE ONE TABLET BY MOUTH DAILY *APPOINTMENT NEEDED FOR FURTHER REFILLS* Yes Claudine Saba MD   albuterol (PROVENTIL) (2.5 MG/3ML) 0.083% nebulizer solution Take 3 mLs by nebulization every 6 hours as needed for Wheezing Yes Claudine Saba MD   Cholecalciferol (VITAMIN D3) 250 MCG (66255 UT) CAPS Take 1 capsule by mouth daily Yes Claudine Saba MD   Cholecalciferol (VITAMIN D3) 250 MCG (98843 UT) CAPS Take 1 capsule by mouth daily Yes Claudine Saba MD   albuterol sulfate HFA (VENTOLIN HFA) 108 (90 Base) MCG/ACT inhaler Inhale 2 puffs into the lungs daily as needed for Wheezing or Shortness of Breath Yes Claudine Saba MD   ketoconazole (NIZORAL) 2 % shampoo Apply to body, leave on about 15 minutes. May repeat in every 2 weeks. Yes Claudine Saba MD   levothyroxine (SYNTHROID) 25 MCG tablet TAKE ONE TABLET BY MOUTH DAILY Yes Claudine Saba MD   omeprazole (PRILOSEC) 40 MG delayed release capsule TAKE ONE CAPSULE BY MOUTH DAILY Yes Claudine Saba MD   PROAIR  (01 Base) MCG/ACT inhaler INHALE TWO PUFFS BY MOUTH DAILY AS NEEDED FOR WHEEZING OR SHORTNESS OF BREATH Yes Claudine Saba MD   melatonin 3 MG TABS tablet Take 3 mg by mouth daily Yes Shaina Lombardi MD   ibuprofen (ADVIL;MOTRIN) 600 MG tablet Take 1 tablet by mouth every 8 hours as needed for Pain Yes Mike Pabon MD      Family History   Problem Relation Age of Onset    Mental Illness Father         bipolar    Heart Disease Father 43        MI x 3    Cancer Sister         cervical    Heart Failure Maternal Grandmother     Asthma Maternal Grandmother      Social History     Tobacco Use    Smoking status: Never Smoker    Smokeless tobacco: Never Used    Tobacco comment: congratulated on nonsmoking status.    Substance Use Topics    Alcohol use: No     Alcohol/week: 0.0 standard drinks    Drug use: No      LAST LABS  Cholesterol, Total   Date Value Ref Range Status   10/01/2019 175 0 - 199 mg/dL Final     LDL Calculated   Date Value Ref Range Status   10/01/2019 see below <100 mg/dL Final     Comment:     When the triglyceride is <30 mg/dL or >300 mg/dL the  calculated LDL and  VLDL are not valid and a measured  LDL is performed. HDL   Date Value Ref Range Status   10/01/2019 42 40 - 60 mg/dL Final     Triglycerides   Date Value Ref Range Status   10/01/2019 301 (H) 0 - 150 mg/dL Final     Lab Results   Component Value Date    GLUCOSE 106 (H) 05/14/2020     Lab Results   Component Value Date     10/01/2019    K 4.4 10/01/2019    CREATININE <0.5 (L) 10/01/2019     Lab Results   Component Value Date    WBC 5.7 10/01/2019    HGB 14.5 10/01/2019    HCT 43.4 10/01/2019    MCV 87.9 10/01/2019     10/01/2019     Lab Results   Component Value Date    ALT 15 10/01/2019    AST 14 (L) 10/01/2019    ALKPHOS 83 10/01/2019    BILITOT <0.2 10/01/2019     TSH (uIU/mL)   Date Value   06/03/2020 1.47     Lab Results   Component Value Date    LABA1C 5.3 10/01/2019     Objective:   PHYSICAL EXAM  /76 (Site: Right Upper Arm, Position: Sitting, Cuff Size: Large Adult)   Pulse 104   Temp 97.8 °F (36.6 °C) (Temporal)   Resp 16   Ht 5' 6\" (1.676 m)   Wt 209 lb (94.8 kg)   SpO2 99%   BMI 33.73 kg/m²   Wt Readings from Last 3 Encounters:   07/16/20 209 lb (94.8 kg)   05/27/20 210 lb (95.3 kg)   01/28/20 203 lb (92.1 kg)     BP Readings from Last 3 Encounters:   07/16/20 122/76   01/28/20 (!) 114/90   12/16/19 130/89     GENERAL: well-developed, well-nourished, alert, no distress  SKIN:  Rash located on entire body. Lesions is/are red linear on chest, vesicular torso, face micro vesicular over erythematous base, diffuse with swelling. .   Sclera white and clear    Assessment/Plan:      Diagnosis Orders   1.  Allergic contact dermatitis due to plants, except food  predniSONE (DELTASONE) 10 MG tablet   Plan as below. Take prednisone taper. For allergies, patient may take OTC antihistamines such as Claritin/Allovert/loratidine or Zyrtec/certrizine 10 mg daily. If that does not work, add daily Flonase or Nasacort nasal spray.

## 2020-07-17 NOTE — ED PROVIDER NOTES
[facility]        Pt Name: Kalani Stephens  MRN: [id number]  [place] 1990  Date of evaluation: 7/16/2020  Provider: Layo Huber PA-C  PCP: Nilesh Winter MD    Evaluation by SHAYY. My supervising physician was available for consultation. CHIEF COMPLAINT       Chief Complaint   Patient presents with    Laceration     Pt says,' I iwas cutting canteloupe and and I cut my tip of my middle finger on thr left hand side. HISTORY OF PRESENT ILLNESS   (Location, Timing/Onset, Context/Setting, Quality, Duration, Modifying Factors, Severity, Associated Signs and Symptoms)  Note limiting factors. Kalani Stephens is a 26 y.o. female presents the emergency department with in the hour prior to an injury that occurred when she was cutting a cantaloupe and nearly avulsed the tip of her middle finger on her nondominant left hand. Patient states she initially had a very difficult time getting bleeding controlled. She states she tried to apply pressure but was having too much pain. Patient states she initially thought that the area after the bleeding had let up was actually numb in feeling. She states she actually gently bumped it and states that it is now causing pain again. Patient states that she is currently up-to-date with her tetanus vaccination. She states this is an isolated complaint to the middle finger of her left hand. Patient states she is found nothing to make the symptoms better and or worsen with this she presents for evaluation and treatment. Nursing Notes were all reviewed and agreed with or any disagreements were addressed in the HPI. REVIEW OF SYSTEMS    (2-9 systems for level 4, 10 or more for level 5)     Review of Systems   Constitutional: Negative for activity change, chills and fever. Respiratory: Negative for chest tightness and shortness of breath. Cardiovascular: Negative for chest pain. Gastrointestinal: Negative for abdominal pain, diarrhea, nausea and vomiting. Genitourinary: Negative for dysuria and flank pain. Skin: Positive for color change and wound. Positives and Pertinent negatives as per HPI. Except as noted above in the ROS, all other systems were reviewed and negative. PAST MEDICAL HISTORY     Past Medical History:   Diagnosis Date    Abnormal Pap smear     Colposcopy; HPV.  Abnormal Pap smear of cervix     Colposcopy positive for HPV.  Abnormal umbilical cord     peripheral cord insertion. monitor growth.  ADHD (attention deficit hyperactivity disorder)     no meds currently.  Anxiety     Currently taking Fluoxitine    Asthma     uses daily inhaler and rescue inhaler PRN.  Bipolar 1 disorder (HCC)     was taking prozac early in pregnancy. switched to Quetiapine, Class C, consider weaning in 3rd trimester.  Diabetes mellitus ([facility] [address]     GDM-insulin dependent    Endometriosis     Febrile convulsions (simple), unspecified     Febrile seizure as an infant one time    History of chicken pox 87    HPV in female     Irritable bowel     Positive PPD, treated     negative chest xray.  Postpartum depression     Pre-eclampsia in third trimester 2012    with G1. Baseline PIH labs and 24 hour urine WNL with G2.    Thyroid disease     Hypothyroid. taking Synthroid. SURGICAL HISTORY     Past Surgical History:   Procedure Laterality Date    BREAST SURGERY      reduction     SECTION      LTCS for suspected macrosomia and PIH.          CURRENTMEDICATIONS       Previous Medications    ALBUTEROL (PROVENTIL) (2.5 MG/3ML) 0.083% NEBULIZER SOLUTION    Take 3 mLs by nebulization every 6 hours as needed for Wheezing    ALBUTEROL SULFATE HFA (VENTOLIN HFA) 108 (90 BASE) MCG/ACT INHALER    Inhale 2 puffs into the lungs daily as needed for Wheezing or Shortness of Breath    CHOLECALCIFEROL (VITAMIN D3) 250 MCG (09057 UT) CAPS    Take 1 noted below, none     Lac Repair    Date/Time: 7/16/2020 8:44 PM  Performed by: Callie Mercer PA-C  Authorized by: Callie Mercer PA-C     Consent:     Consent obtained:  Verbal    Consent given by:  Patient  Anesthesia (see MAR for exact dosages): Anesthesia method:  Nerve block    Block needle gauge:  25 G    Block anesthetic:  Bupivacaine 0.5% w/o epi and lidocaine 1% w/o epi  Laceration details:     Location:  Finger    Finger location:  L long finger  Post-procedure details:     Dressing:  Non-adherent dressing and sterile dressing  Comments:      Digital nerve block was necessary to fully cleanse as well as evaluate the wound. Unfortunately the avulsive piece is not repairable. It is also noted to be superficial.  Utilizing iris scissors the remainder of the piece was then removed. Pressure was applied with minimal bleeding. Patient tolerated this well with no immediate evidence of complication. CRITICAL CARE TIME   N/A    CONSULTS:  None      EMERGENCY DEPARTMENT COURSE and DIFFERENTIAL DIAGNOSIS/MDM:   Vitals:    Vitals:    07/16/20 1844   BP: 133/89   Pulse: 113   Resp: 18   Temp: 98 °F (36.7 °C)   TempSrc: Oral   SpO2: 96%   Weight: 209 lb (94.8 kg)   Height: 5' 6\" (1.676 m)       Patient was given the following medications:  Medications - No data to display        *The patient's detailed history of present illness is documented as above. Upon arrival to the emergency department the patient's vital signs are as documented. The patient is noted to be hemodynamically stable and afebrile. Physical examination findings are as above. Patient's tetanus vaccination is up-to-date. Procedures performed as above for the patient's avulsive injury of the skin over the middle finger of her nondominant left hand. Wound care in the form of a non-adherent dressing was applied. I discussed ongoing care management on outpatient basis and follow-up with her primary care physician as needed.   Unfortunately she is aware this will likely be a nuisance for her until it can completely heal and that will likely take as much as 3 weeks. Strict potential instructions for return. Antibiotics not currently indicated. The patient has been made aware of the signs and symptoms which would necessitate an immediate return to the emergency department and verbalizes an understanding of these signs and symptoms. I estimate there is low risk for compartment syndrome, deep venous thrombosis, limb-threatening infectious, tendon and/or neurovascular injury and therefore I consider the discharge disposition reasonable. Andie Gonzalez and I have discussed the diagnosis and risks, and we agree with discharging home to follow-up with their primary care provider and/or the referring orthopedist on call. We also discussed returning to the emergency department immediately if new or worsening symptoms occur. We have discussed the symptoms which are most concerning (e.g., changing or worsening pain, numbness, weakness) that necessitate immediate return. FINAL IMPRESSION      1.  Avulsion of fingertip, initial encounter          DISPOSITION/PLAN   DISPOSITION Decision To Discharge 07/16/2020 08:47:22 PM      PATIENT REFERREDTO:  Savannah Corrigan MD  [address] [place] [address]  [phone]      As needed    [facility]  [address] [facility]  [phone]    If symptoms worsen      DISCHARGE MEDICATIONS:  New Prescriptions    No medications on file       DISCONTINUED MEDICATIONS:  Discontinued Medications    No medications on file              (Please note that portions of this note were completed with a voice recognition program.  Efforts were made to edit the dictations but occasionally words are mis-transcribed.)    Yvette Cardona PA-C (electronically signed)           Sae Esposito PA-C  07/16/20 2209

## 2020-07-22 ENCOUNTER — TELEPHONE (OUTPATIENT)
Dept: FAMILY MEDICINE CLINIC | Age: 30
End: 2020-07-22

## 2020-07-22 RX ORDER — MONTELUKAST SODIUM 10 MG/1
10 TABLET ORAL NIGHTLY
Qty: 10 TABLET | Refills: 0 | Status: SHIPPED | OUTPATIENT
Start: 2020-07-22 | End: 2020-07-29

## 2020-07-22 RX ORDER — FAMOTIDINE 20 MG/1
20 TABLET, FILM COATED ORAL 2 TIMES DAILY
Qty: 20 TABLET | Refills: 0 | Status: SHIPPED | OUTPATIENT
Start: 2020-07-22 | End: 2020-07-29

## 2020-07-22 NOTE — TELEPHONE ENCOUNTER
Please notify patient that prescription was e-scribed to pharmacy for [address]. Please get her in tomorrow with me. Can add on at end if needed or double book with her family member.

## 2020-07-22 NOTE — TELEPHONE ENCOUNTER
PT WAS SEEN LAST WEEK FOR POISON IVY  SHE WAS PUT ON ANTIBIOTIC (PREDNISONE 10MG)   THE PREDNISONE ISNT WORKING- SHE IS STILL ITCHING, RED, HOT, HANDS ARE LITTLE SWOLLEN  THE POISON IVY IS OFF HER FACE NOW  SHE SAID SHE WOULD COME IN AND BE SEEN AGAIN TO GET A STEROID SHOT OR SOMETHING    PLEASE ADVISE

## 2020-07-23 ENCOUNTER — OFFICE VISIT (OUTPATIENT)
Dept: FAMILY MEDICINE CLINIC | Age: 30
End: 2020-07-23
Payer: COMMERCIAL

## 2020-07-23 VITALS
SYSTOLIC BLOOD PRESSURE: 124 MMHG | BODY MASS INDEX: 33.85 KG/M2 | WEIGHT: 210.6 LBS | RESPIRATION RATE: 14 BRPM | HEART RATE: 93 BPM | OXYGEN SATURATION: 97 % | DIASTOLIC BLOOD PRESSURE: 76 MMHG | HEIGHT: 66 IN | TEMPERATURE: 97.5 F

## 2020-07-23 PROCEDURE — 99213 OFFICE O/P EST LOW 20 MIN: CPT | Performed by: FAMILY MEDICINE

## 2020-07-23 PROCEDURE — G8417 CALC BMI ABV UP PARAM F/U: HCPCS | Performed by: FAMILY MEDICINE

## 2020-07-23 PROCEDURE — G8427 DOCREV CUR MEDS BY ELIG CLIN: HCPCS | Performed by: FAMILY MEDICINE

## 2020-07-23 PROCEDURE — 1036F TOBACCO NON-USER: CPT | Performed by: FAMILY MEDICINE

## 2020-07-23 NOTE — PROGRESS NOTES
FOLLOW-UP VISIT   Subjective:   HPI:   Chief Complaint   Patient presents with    Rash     x8 days. arms. chest. hands. red itchy but doing better    Patient here for follow-up of:poison ivy   Prednisone help some but face redness not better until added singulair and H2 blocker yesterday. HISTORY:  Patient's medications, allergies, past medical, and social histories were reviewed and updated as appropriate. CHART REVIEW  Health Maintenance   Topic Date Due    Varicella vaccine (1 of 2 - 2-dose childhood series) 07/04/1989    Flu vaccine (1) 09/01/2020    TSH testing  06/03/2021    Cervical cancer screen  06/03/2025    DTaP/Tdap/Td vaccine (8 - Td) 08/11/2028    Hepatitis B vaccine  Completed    Hib vaccine  Completed    Pneumococcal 0-64 years Vaccine  Completed    HIV screen  Completed    Hepatitis A vaccine  Aged Out    Meningococcal (ACWY) vaccine  Aged Out     The ASCVD Risk score (Ples Mcardle., et al., 2013) failed to calculate for the following reasons: The 2013 ASCVD risk score is only valid for ages 36 to 78  Prior to Visit Medications    Medication Sig Taking? Authorizing Provider   montelukast (SINGULAIR) 10 MG tablet Take 1 tablet by mouth nightly for 10 days Yes Jordon Busby MD   famotidine (PEPCID) 20 MG tablet Take 1 tablet by mouth 2 times daily for 10 days Yes Jordon Busby MD   predniSONE (DELTASONE) 10 MG tablet Take 3 twice a day for 3 days, then 2 twice a day for 3 days, the 1 twice a day for 3 days, then 1 daily for 3 days, then STOP.  Yes Jordon Busby MD   QUEtiapine (SEROQUEL XR) 300 MG extended release tablet TAKE ONE TABLET BY MOUTH DAILY *APPOINTMENT NEEDED FOR FURTHER REFILLS* Yes Jordon Busby MD   albuterol (PROVENTIL) (2.5 MG/3ML) 0.083% nebulizer solution Take 3 mLs by nebulization every 6 hours as needed for Wheezing Yes Jordon Busby MD   Cholecalciferol (VITAMIN D3) 250 MCG (59138 UT) CAPS Take 1 capsule by mouth daily Yes Jordon Busby MD   albuterol sulfate HFA (VENTOLIN HFA) 108 (90 Base) MCG/ACT inhaler Inhale 2 puffs into the lungs daily as needed for Wheezing or Shortness of Breath Yes Savannah Corrigan MD   ketoconazole (NIZORAL) 2 % shampoo Apply to body, leave on about 15 minutes. May repeat in every 2 weeks. Yes Savannah Corrigan MD   levothyroxine (SYNTHROID) 25 MCG tablet TAKE ONE TABLET BY MOUTH DAILY Yes Savannah Corrigan MD   omeprazole (PRILOSEC) 40 MG delayed release capsule TAKE ONE CAPSULE BY MOUTH DAILY Yes Savannah Corrigan MD   PROAIR  (49 Base) MCG/ACT inhaler INHALE TWO PUFFS BY MOUTH DAILY AS NEEDED FOR WHEEZING OR SHORTNESS OF BREATH Yes Savannah Corrigan MD   melatonin 3 MG TABS tablet Take 3 mg by mouth daily Yes Shaina Lombardi MD   ibuprofen (ADVIL;MOTRIN) 600 MG tablet Take 1 tablet by mouth every 8 hours as needed for Pain Yes Jimbo Gao MD      Family History   Problem Relation Age of Onset    Mental Illness Father         bipolar    Heart Disease Father 43        MI x 3    Cancer Sister         cervical    Heart Failure Maternal Grandmother     Asthma Maternal Grandmother      Social History     Tobacco Use    Smoking status: Never Smoker    Smokeless tobacco: Never Used    Tobacco comment: congratulated on nonsmoking status. Substance Use Topics    Alcohol use: No     Alcohol/week: 0.0 standard drinks    Drug use: No      LAST LABS  Cholesterol, Total   Date Value Ref Range Status   10/01/2019 175 0 - 199 mg/dL Final     LDL Calculated   Date Value Ref Range Status   10/01/2019 see below <100 mg/dL Final     Comment:     When the triglyceride is <30 mg/dL or >300 mg/dL the  calculated LDL and  VLDL are not valid and a measured  LDL is performed.        HDL   Date Value Ref Range Status   10/01/2019 42 40 - 60 mg/dL Final     Triglycerides   Date Value Ref Range Status   10/01/2019 301 (H) 0 - 150 mg/dL Final     Lab Results   Component Value Date    GLUCOSE 106 (H) 05/14/2020     Lab Results   Component Value Date

## 2020-07-29 RX ORDER — MONTELUKAST SODIUM 10 MG/1
TABLET ORAL
Qty: 10 TABLET | Refills: 0 | Status: SHIPPED | OUTPATIENT
Start: 2020-07-29 | End: 2020-12-07

## 2020-07-29 RX ORDER — FAMOTIDINE 20 MG/1
TABLET, FILM COATED ORAL
Qty: 20 TABLET | Refills: 0 | Status: SHIPPED | OUTPATIENT
Start: 2020-07-29 | End: 2020-09-25

## 2020-08-17 RX ORDER — QUETIAPINE 300 MG/1
TABLET, FILM COATED, EXTENDED RELEASE ORAL
Qty: 30 TABLET | Refills: 1 | Status: SHIPPED | OUTPATIENT
Start: 2020-08-17 | End: 2020-10-19

## 2020-09-23 ENCOUNTER — TELEPHONE (OUTPATIENT)
Dept: FAMILY MEDICINE CLINIC | Age: 30
End: 2020-09-23

## 2020-09-25 ENCOUNTER — TELEMEDICINE (OUTPATIENT)
Dept: FAMILY MEDICINE CLINIC | Age: 30
End: 2020-09-25
Payer: COMMERCIAL

## 2020-09-25 PROCEDURE — G8427 DOCREV CUR MEDS BY ELIG CLIN: HCPCS | Performed by: FAMILY MEDICINE

## 2020-09-25 PROCEDURE — 99213 OFFICE O/P EST LOW 20 MIN: CPT | Performed by: FAMILY MEDICINE

## 2020-09-25 RX ORDER — GABAPENTIN 100 MG/1
100-300 CAPSULE ORAL NIGHTLY
Qty: 90 CAPSULE | Refills: 1 | Status: SHIPPED | OUTPATIENT
Start: 2020-09-25 | End: 2020-11-23

## 2020-09-25 RX ORDER — OMEPRAZOLE 40 MG/1
40 CAPSULE, DELAYED RELEASE ORAL DAILY
Qty: 30 CAPSULE | Refills: 5 | Status: SHIPPED | OUTPATIENT
Start: 2020-09-25 | End: 2021-03-29

## 2020-09-25 NOTE — PROGRESS NOTES
TELEHEALTH EVALUATION -- Audio/Visual (During SSVID-35 public health emergency)  VIDEO VISIT- patient and provider not at same location  Also present:none. PROBLEM VISIT NOTE     Subjective:     Chief Complaint   Patient presents with   Doris Downing is a 26 y.o. female   who presents pt is not going to bed till 3 am, she thinks she has restless leg, feels like needing to move legs x 2-3 mos    · her father passed away suddenly and her grandma is in hospice,   · she is waking up at 7 or 8 so not getting much sleep. She was taking melatonin before but now she is taking melatonin and nyquil to sleep, lDuration month or so   · Feeling really down, denies SI. Only gets out of bed for kids. Associated with dad passing away   Tried melatonin and nyquil     Patient's medications, allergies, past medical, and social histories were reviewed and updated as appropriate (see below). Review of Systems   General ROS: fever? No,    Night sweats? No  Endocrine ROS:malaise/lethargy? No   Unexpected weight changes? No  Respiratory ROS: cough? No   Shortness of breath? No  Cardiovascular ROS:chest pain? Yes    Shortness of breath with exertion? No  Gastrointestinal ROS: abdominal pain? Yes   Change in stools? No  Genito-Urinary ROS: painful urination? No   Trouble voiding? No  Neurological ROS: TIA or stroke symptoms? No   Numbness/tingling in feet?  No  Health Maintenance Due   Topic Date Due    Varicella vaccine (1 of 2 - 2-dose childhood series) 07/04/1989    Flu vaccine (1) 09/01/2020     *Chief complaint, HPI, History and ROS provided by the medical assistant has been reviewed and verified by provider- Wilmer Hernandez MD    CHART REVIEW  Health Maintenance   Topic Date Due    Varicella vaccine (1 of 2 - 2-dose childhood series) 07/04/1989    Flu vaccine (1) 09/01/2020    TSH testing  06/03/2021    Cervical cancer screen  06/03/2025    DTaP/Tdap/Td vaccine (8 - Td) 08/11/2028    Hepatitis B vaccine Completed    Hib vaccine  Completed    Pneumococcal 0-64 years Vaccine  Completed    HIV screen  Completed    Hepatitis A vaccine  Aged Out    Meningococcal (ACWY) vaccine  Aged Out     The ASCVD Risk score (Jim Hernandez, et al., 2013) failed to calculate for the following reasons: The 2013 ASCVD risk score is only valid for ages 36 to 78  Prior to Visit Medications    Medication Sig Taking? Authorizing Provider   gabapentin (NEURONTIN) 100 MG capsule Take 1-3 capsules by mouth nightly for 90 days. Yes William Mauricio MD   omeprazole (PRILOSEC) 40 MG delayed release capsule Take 1 capsule by mouth daily Yes William Mauricio MD   QUEtiapine (SEROQUEL XR) 300 MG extended release tablet TAKE ONE TABLET BY MOUTH DAILY Yes William Mauricio MD   montelukast (SINGULAIR) 10 MG tablet TAKE ONE TABLET BY MOUTH ONCE NIGHTLY FOR 10 DAYS Yes William Mauricio MD   albuterol (PROVENTIL) (2.5 MG/3ML) 0.083% nebulizer solution Take 3 mLs by nebulization every 6 hours as needed for Wheezing Yes William Mauricio MD   Cholecalciferol (VITAMIN D3) 250 MCG (95541 UT) CAPS Take 1 capsule by mouth daily Yes William Mauricio MD   albuterol sulfate HFA (VENTOLIN HFA) 108 (90 Base) MCG/ACT inhaler Inhale 2 puffs into the lungs daily as needed for Wheezing or Shortness of Breath Yes William Mauricio MD   ketoconazole (NIZORAL) 2 % shampoo Apply to body, leave on about 15 minutes. May repeat in every 2 weeks.  Yes William Mauricio MD   levothyroxine (SYNTHROID) 25 MCG tablet TAKE ONE TABLET BY MOUTH DAILY Yes William Mauricio MD   PROAIR  (47 Base) MCG/ACT inhaler INHALE TWO PUFFS BY MOUTH DAILY AS NEEDED FOR WHEEZING OR SHORTNESS OF BREATH Yes William Mauricio MD   melatonin 3 MG TABS tablet Take 3 mg by mouth daily Yes Historical Provider, MD   ibuprofen (ADVIL;MOTRIN) 600 MG tablet Take 1 tablet by mouth every 8 hours as needed for Pain Yes Crys Albright MD      Family History   Problem Relation Age of Onset    Mental Illness Father         bipolar distress. EYES:   · External findings: lids and lashes normal and conjunctivae and sclerae normal  · Eyes: no periorbital cellulitis. HENT:   · Normocephalic, atraumatic  · External nose and ears appear normal  · Mucous membranes are moist  · Hearing grossly normal.     NECK: No visible masses  LUNGS:    · Respiratory effort normal.  · No visualized signs of difficulty breathing or respiratory distress  SKIN: warm and dry  · No significant exanthematous lesions or discoloration noted on facial skin  PSYCH:    · Alert and oriented, able to follow commands  · Normal reasoning, insight good  · Normal affect  · No memory disturbance noted  NEURO:   No Facial Asymmetry (Cranial nerve 7 motor function) (limited exam to video visit)      No gaze palsy      Assessment and Plan:      Diagnosis Orders   1. Restless legs syndrome (RLS)  gabapentin (NEURONTIN) 100 MG capsule   2. Gastroesophageal reflux disease without esophagitis  omeprazole (PRILOSEC) 40 MG delayed release capsule   Plan below. INSTRUCTIONS  · NEXT APPOINTMENT: Please schedule check-up in 6 weeks. · See Candida Hernandez CNP for psychiatry at Dr. Mason Chinchilla office. Call [phone] to schedule. · Start gabapentin for RLS      Virtual Visit (video visit) encounter employed to address concerns as mentioned above. A caregiver was present when appropriate. Due to this being a TeleHealth encounter (During [facility]- public health emergency), evaluation of the following organ systems was limited. Pursuant to the emergency declaration under the [facility], [address] [place] [address] authority and the [employer] Act, this Virtual Visit was conducted with patient's (and/or legal guardian's) consent, to reduce the patient's risk of exposure to COVID-19 and provide necessary medical care.   The patient (and/or legal guardian) has also been advised to contact this office for worsening conditions or problems, and seek emergency medical treatment and/or call 911 if deemed necessary. Services were provided through a video synchronous discussion virtually to substitute for in-person clinic visit. Patient and provider were located at their individual homes. minutes: 11-20 minutes were spent on the digital evaluation and management of this patient. --Asia Rudd MD on 9/25/2020 at 9:16 AM    An electronic signature was used to authenticate this note.

## 2020-10-08 RX ORDER — LEVOTHYROXINE SODIUM 0.03 MG/1
TABLET ORAL
Qty: 30 TABLET | Refills: 3 | Status: SHIPPED | OUTPATIENT
Start: 2020-10-08 | End: 2021-03-04

## 2020-10-23 ENCOUNTER — TELEPHONE (OUTPATIENT)
Dept: FAMILY MEDICINE CLINIC | Age: 30
End: 2020-10-23

## 2020-10-23 ENCOUNTER — TELEMEDICINE (OUTPATIENT)
Dept: FAMILY MEDICINE CLINIC | Age: 30
End: 2020-10-23
Payer: COMMERCIAL

## 2020-10-23 PROCEDURE — G8427 DOCREV CUR MEDS BY ELIG CLIN: HCPCS | Performed by: FAMILY MEDICINE

## 2020-10-23 PROCEDURE — 99213 OFFICE O/P EST LOW 20 MIN: CPT | Performed by: FAMILY MEDICINE

## 2020-10-23 NOTE — PROGRESS NOTES
TELEHEALTH EVALUATION -- Audio/Visual (During ZPJEB-39 public health emergency)  VIDEO VISIT- patient and provider not at same location  Also present:none. FOLLOW-UP VISIT   Subjective:   HPI:   Chief Complaint   Patient presents with    Insomnia    Patient here for follow-up of:  1. Primary insomnia    2. Anxiety    3. Atypical depression    4. Asthma, mild intermittent, well-controlled    5. Moderate episode of recurrent major depressive disorder (HCC)         Complaints: pt states she is sleeping much better, mentally she doing a lot better. Her grandma passed away and so did dad. Exposed to [employer] at [address] [place] [address] she is still able to sleep with marilu 200 mg..  She thinks her household has covid or the flu. Her and her  have it. Staying quarantined. · Exercise: walking three times a week  · Taking medicines daily as directed? Yes  · Any side effects of medications? No    Review of Systems   General ROS: fever? No,    Night sweats? No  Ophthalmic ROS: change in vision? No  Endocrine ROS: fatigue? Yes   Unexpected weight changes? No  Respiratory ROS: cough? Yes   Wheezing? No  Cardiovascular ROS: chest pain? No   Shortness of breath? No  Neurological ROS: TIA or stroke symptoms? No   Numbness/tingling?  No  Health Maintenance Due   Topic Date Due    Varicella vaccine (1 of 2 - 2-dose childhood series) 07/04/1989    Flu vaccine (1) 09/01/2020     *Chief complaint, HPI, History and ROS provided by the medical assistant has been reviewed and verified by provider- Esvin Durán MD    CHART REVIEW  Health Maintenance   Topic Date Due    Varicella vaccine (1 of 2 - 2-dose childhood series) 07/04/1989    Flu vaccine (1) 09/01/2020    TSH testing  06/03/2021    Cervical cancer screen  06/03/2025    DTaP/Tdap/Td vaccine (8 - Td) 08/11/2028    Hepatitis B vaccine  Completed    Hib vaccine  Completed    Pneumococcal 0-64 years Vaccine  Completed    HIV screen  Completed    Hepatitis A vaccine  Aged Out    Meningococcal (ACWY) vaccine  Aged Out     The ASCVD Risk score (Debra Patrick, et al., 2013) failed to calculate for the following reasons: The 2013 ASCVD risk score is only valid for ages 36 to 78  Prior to Visit Medications    Medication Sig Taking? Authorizing Provider   QUEtiapine (SEROQUEL XR) 300 MG extended release tablet TAKE ONE TABLET BY MOUTH DAILY Yes Sim Amador MD   levothyroxine (SYNTHROID) 25 MCG tablet TAKE ONE TABLET BY MOUTH DAILY Yes Sim Amador MD   gabapentin (NEURONTIN) 100 MG capsule Take 1-3 capsules by mouth nightly for 90 days. Yes Sim Amador MD   omeprazole (PRILOSEC) 40 MG delayed release capsule Take 1 capsule by mouth daily Yes Sim Amador MD   montelukast (SINGULAIR) 10 MG tablet TAKE ONE TABLET BY MOUTH ONCE NIGHTLY FOR 10 DAYS Yes Sim Amador MD   albuterol (PROVENTIL) (2.5 MG/3ML) 0.083% nebulizer solution Take 3 mLs by nebulization every 6 hours as needed for Wheezing Yes Sim Amador MD   Cholecalciferol (VITAMIN D3) 250 MCG (84146 UT) CAPS Take 1 capsule by mouth daily Yes Sim Amador MD   albuterol sulfate HFA (VENTOLIN HFA) 108 (90 Base) MCG/ACT inhaler Inhale 2 puffs into the lungs daily as needed for Wheezing or Shortness of Breath Yes Sim Amador MD   ketoconazole (NIZORAL) 2 % shampoo Apply to body, leave on about 15 minutes. May repeat in every 2 weeks.  Yes Sim Amador MD   PROAIR  (78 Base) MCG/ACT inhaler INHALE TWO PUFFS BY MOUTH DAILY AS NEEDED FOR WHEEZING OR SHORTNESS OF BREATH Yes Sim Amador MD   melatonin 3 MG TABS tablet Take 3 mg by mouth daily Yes Historical Provider, MD   ibuprofen (ADVIL;MOTRIN) 600 MG tablet Take 1 tablet by mouth every 8 hours as needed for Pain Yes Verlie Holstein., MD      Family History   Problem Relation Age of Onset    Mental Illness Father         bipolar    Heart Disease Father 43        MI x 3    Cancer Sister         cervical    Heart Failure Maternal Grandmother     Asthma Maternal Grandmother      Social History     Tobacco Use    Smoking status: Never Smoker    Smokeless tobacco: Never Used    Tobacco comment: congratulated on nonsmoking status. Substance Use Topics    Alcohol use: No     Alcohol/week: 0.0 standard drinks    Drug use: No      LAST LABS  Cholesterol, Total   Date Value Ref Range Status   10/01/2019 175 0 - 199 mg/dL Final     LDL Calculated   Date Value Ref Range Status   10/01/2019 see below <100 mg/dL Final     Comment:     When the triglyceride is <30 mg/dL or >300 mg/dL the  calculated LDL and  VLDL are not valid and a measured  LDL is performed. HDL   Date Value Ref Range Status   10/01/2019 42 40 - 60 mg/dL Final     Triglycerides   Date Value Ref Range Status   10/01/2019 301 (H) 0 - 150 mg/dL Final     Lab Results   Component Value Date    GLUCOSE 106 (H) 05/14/2020     Lab Results   Component Value Date     10/01/2019    K 4.4 10/01/2019    CREATININE <0.5 (L) 10/01/2019     Lab Results   Component Value Date    WBC 5.7 10/01/2019    HGB 14.5 10/01/2019    HCT 43.4 10/01/2019    MCV 87.9 10/01/2019     10/01/2019     Lab Results   Component Value Date    ALT 15 10/01/2019    AST 14 (L) 10/01/2019    ALKPHOS 83 10/01/2019    BILITOT <0.2 10/01/2019     TSH (uIU/mL)   Date Value   06/03/2020 1.47     Lab Results   Component Value Date    LABA1C 5.3 10/01/2019      Objective:   PHYSICAL EXAM:  There were no vitals taken for this visit. BP Readings from Last 5 Encounters:   07/23/20 124/76   07/16/20 130/82   07/16/20 122/76   01/28/20 (!) 114/90   12/16/19 130/89     Wt Readings from Last 5 Encounters:   07/23/20 210 lb 9.6 oz (95.5 kg)   07/16/20 209 lb (94.8 kg)   07/16/20 209 lb (94.8 kg)   05/27/20 210 lb (95.3 kg)   01/28/20 203 lb (92.1 kg)      GENERAL:   · well-developed, well-nourished, alert, no distress.      EYES:   · External findings: lids and lashes normal and conjunctivae and sclerae normal  · Eyes: no periorbital cellulitis. HENT:   · Normocephalic, atraumatic  · External nose and ears appear normal  · Mucous membranes are moist  · Hearing grossly normal.     NECK: No visible masses  LUNGS:    · Respiratory effort normal.  · No visualized signs of difficulty breathing or respiratory distress  SKIN: warm and dry  · No significant exanthematous lesions or discoloration noted on facial skin  PSYCH:    · Alert and oriented, able to follow commands  · Normal reasoning, insight good  · Normal affect  · No memory disturbance noted  NEURO:   No Facial Asymmetry (Cranial nerve 7 motor function) (limited exam to video visit)      No gaze palsy      Assessment and Plan:      Diagnosis Orders   1. Primary insomnia     2. Anxiety     3. Atypical depression     4. Asthma, mild intermittent, well-controlled     5. Moderate episode of recurrent major depressive disorder ([facility] [address]     Plan below. INSTRUCTIONS  · NEXT APPOINTMENT: Please schedule annual complete physical (30 minutes) in 3 months. Virtual Visit (video visit) encounter employed to address concerns as mentioned above. A caregiver was present when appropriate. Due to this being a TeleHealth encounter (During [place] [address] public health emergency), evaluation of the following organ systems was limited. Pursuant to the emergency declaration under the [facility], [address] [place] [address] authority and the [employer] Act, this Virtual Visit was conducted with patient's (and/or legal guardian's) consent, to reduce the patient's risk of exposure to COVID-19 and provide necessary medical care. The patient (and/or legal guardian) has also been advised to contact this office for worsening conditions or problems, and seek emergency medical treatment and/or call 911 if deemed necessary. Services were provided through a video synchronous discussion virtually to substitute for in-person clinic visit.  Patient and provider were located at their individual homes. minutes: 11-20 minutes were spent on the digital evaluation and management of this patient. --Vick Campbell MD on 10/23/2020 at 11:40 AM    An electronic signature was used to authenticate this note.

## 2020-10-28 ENCOUNTER — OFFICE VISIT (OUTPATIENT)
Dept: FAMILY MEDICINE CLINIC | Age: 30
End: 2020-10-28
Payer: COMMERCIAL

## 2020-10-28 VITALS
RESPIRATION RATE: 16 BRPM | SYSTOLIC BLOOD PRESSURE: 118 MMHG | DIASTOLIC BLOOD PRESSURE: 82 MMHG | HEART RATE: 94 BPM | BODY MASS INDEX: 34.55 KG/M2 | OXYGEN SATURATION: 99 % | WEIGHT: 215 LBS | TEMPERATURE: 97.4 F | HEIGHT: 66 IN

## 2020-10-28 LAB
BILIRUBIN, POC: NEGATIVE
BLOOD URINE, POC: NORMAL
CLARITY, POC: CLEAR
COLOR, POC: YELLOW
GLUCOSE URINE, POC: NEGATIVE
KETONES, POC: NEGATIVE
LEUKOCYTE EST, POC: NEGATIVE
NITRITE, POC: NEGATIVE
PH, POC: 6
PROTEIN, POC: NEGATIVE
SPECIFIC GRAVITY, POC: 1.02
UROBILINOGEN, POC: NORMAL

## 2020-10-28 PROCEDURE — 81002 URINALYSIS NONAUTO W/O SCOPE: CPT | Performed by: FAMILY MEDICINE

## 2020-10-28 PROCEDURE — 1036F TOBACCO NON-USER: CPT | Performed by: FAMILY MEDICINE

## 2020-10-28 PROCEDURE — G8427 DOCREV CUR MEDS BY ELIG CLIN: HCPCS | Performed by: FAMILY MEDICINE

## 2020-10-28 PROCEDURE — G8484 FLU IMMUNIZE NO ADMIN: HCPCS | Performed by: FAMILY MEDICINE

## 2020-10-28 PROCEDURE — G8417 CALC BMI ABV UP PARAM F/U: HCPCS | Performed by: FAMILY MEDICINE

## 2020-10-28 PROCEDURE — 99213 OFFICE O/P EST LOW 20 MIN: CPT | Performed by: FAMILY MEDICINE

## 2020-10-28 RX ORDER — PHENAZOPYRIDINE HYDROCHLORIDE 200 MG/1
200 TABLET, FILM COATED ORAL 3 TIMES DAILY PRN
Qty: 9 TABLET | Refills: 0 | Status: SHIPPED | OUTPATIENT
Start: 2020-10-28 | End: 2020-10-31

## 2020-10-28 RX ORDER — SULFAMETHOXAZOLE AND TRIMETHOPRIM 800; 160 MG/1; MG/1
1 TABLET ORAL 2 TIMES DAILY
Qty: 14 TABLET | Refills: 0 | Status: SHIPPED | OUTPATIENT
Start: 2020-10-28 | End: 2020-11-04

## 2020-10-28 NOTE — PATIENT INSTRUCTIONS
Please finish taking ALL of the antibiotics. Take pyridium Rx or Azo OTC for discomfort. Our office will call with urine culture results. Call If not better in  3 days.

## 2020-10-28 NOTE — PROGRESS NOTES
URINARY SYMPTOMS  Subjective:     Chief Complaint   Patient presents with    Urinary Tract Infection      Fawn Nevarez is a 26 y.o. female who complains of dysuria, frequency, urgency, burning with urination, incomplete bladder emptying, incontinence, suprapubic pressure. She has had symptoms for 1 week. Patient denies backpain and diarrhea, no vaginal discharge. Patient does have a history of recurrent UTI. Blood with wiping once but due for period. LMP 1 mo ago. S/P BTL. Review of Systems   General ROS: fever? No,    Night sweats? No  Respiratory ROS: cough? No   Shortness of breath? No  Cardiovascular ROS:chest pain? No   Shortness of breath with exertion? No  Gastrointestinal ROS: abdominal pain? No   Change in stools? No  Genito-Urinary ROS: painful urination? Yes   Trouble voiding? yes    *Chief complaint, HPI and History provided by the medical assistant has been reviewed and verified by provider Garret Garcia MD    HISTORY:  Patient's medications, allergies, past medical, and social histories were reviewed and updated as appropriate. CHART REVIEW  Health Maintenance   Topic Date Due    Varicella vaccine (1 of 2 - 2-dose childhood series) 07/04/1989    Flu vaccine (1) 09/01/2020    TSH testing  06/03/2021    Cervical cancer screen  06/03/2025    DTaP/Tdap/Td vaccine (8 - Td) 08/11/2028    Hepatitis B vaccine  Completed    Hib vaccine  Completed    Pneumococcal 0-64 years Vaccine  Completed    HIV screen  Completed    Hepatitis A vaccine  Aged Out    Meningococcal (ACWY) vaccine  Aged Out     The ASCVD Risk score (Eder Millan., et al., 2013) failed to calculate for the following reasons: The 2013 ASCVD risk score is only valid for ages 36 to 78  Prior to Visit Medications    Medication Sig Taking?  Authorizing Provider   sulfamethoxazole-trimethoprim (BACTRIM DS) 800-160 MG per tablet Take 1 tablet by mouth 2 times daily for 7 days Yes Garret Garcia MD   phenazopyridine (PYRIDIUM) 200 Alcohol use: No     Alcohol/week: 0.0 standard drinks    Drug use: No      LAST LABS  Cholesterol, Total   Date Value Ref Range Status   10/01/2019 175 0 - 199 mg/dL Final     LDL Calculated   Date Value Ref Range Status   10/01/2019 see below <100 mg/dL Final     Comment:     When the triglyceride is <30 mg/dL or >300 mg/dL the  calculated LDL and  VLDL are not valid and a measured  LDL is performed. HDL   Date Value Ref Range Status   10/01/2019 42 40 - 60 mg/dL Final     Triglycerides   Date Value Ref Range Status   10/01/2019 301 (H) 0 - 150 mg/dL Final     Lab Results   Component Value Date    GLUCOSE 106 (H) 05/14/2020     Lab Results   Component Value Date     10/01/2019    K 4.4 10/01/2019    CREATININE <0.5 (L) 10/01/2019     Lab Results   Component Value Date    WBC 5.7 10/01/2019    HGB 14.5 10/01/2019    HCT 43.4 10/01/2019    MCV 87.9 10/01/2019     10/01/2019     Lab Results   Component Value Date    ALT 15 10/01/2019    AST 14 (L) 10/01/2019    ALKPHOS 83 10/01/2019    BILITOT <0.2 10/01/2019     TSH (uIU/mL)   Date Value   06/03/2020 1.47     Lab Results   Component Value Date    LABA1C 5.3 10/01/2019     Objective:   PHYSICAL EXAM  /82 (Site: Right Upper Arm, Position: Sitting, Cuff Size: Large Adult)   Pulse 94   Temp 97.4 °F (36.3 °C) (Temporal)   Resp 16   Ht 5' 6\" (1.676 m)   Wt 215 lb (97.5 kg)   SpO2 99%   BMI 34.70 kg/m²   Wt Readings from Last 3 Encounters:   10/28/20 215 lb (97.5 kg)   07/23/20 210 lb 9.6 oz (95.5 kg)   07/16/20 209 lb (94.8 kg)     BP Readings from Last 3 Encounters:   10/28/20 118/82   07/23/20 124/76   07/16/20 130/82     GENERAL: alert, appears stated age and no distress  BACK: symmetric, no CVA tenderness. ABDOMEN: soft, mild-tender RLQ     Assessment/Plan:      Diagnosis Orders   1.  Urinary frequency  POCT Urinalysis no Micro    Culture, Urine    sulfamethoxazole-trimethoprim (BACTRIM DS) 800-160 MG per tablet    phenazopyridine

## 2020-10-29 LAB — URINE CULTURE, ROUTINE: NORMAL

## 2020-11-23 RX ORDER — GABAPENTIN 100 MG/1
CAPSULE ORAL
Qty: 90 CAPSULE | Refills: 0 | Status: SHIPPED | OUTPATIENT
Start: 2020-11-23 | End: 2021-01-25

## 2020-12-04 ENCOUNTER — NURSE TRIAGE (OUTPATIENT)
Dept: OTHER | Facility: CLINIC | Age: 30
End: 2020-12-04

## 2020-12-04 ENCOUNTER — TELEPHONE (OUTPATIENT)
Dept: FAMILY MEDICINE CLINIC | Age: 30
End: 2020-12-04

## 2020-12-04 NOTE — TELEPHONE ENCOUNTER
PT HAS A COUGH  SHE HAS HAD IT FOR A MONTH  SHE SAID IT IS MORE OF AN ANNOYING COUGH  DRY COUGH  SHE SPOKE TO NURSE TRIAGE TODAY AND THEY STATED SHE NEEDED TO BE SEEN TODAY  PT STATED THAT SHE CAN WAIT TO BE SEEN    PLEASE ADVISE

## 2020-12-04 NOTE — TELEPHONE ENCOUNTER
Reason for Disposition   SEVERE coughing spells (e.g., whooping sound after coughing, vomiting after coughing)    Answer Assessment - Initial Assessment Questions  1. ONSET: \"When did the cough begin? \"       Over a month ago     2. SEVERITY: \"How bad is the cough today? \"         Dry     3. RESPIRATORY DISTRESS: \"Describe your breathing. \"        Denies SOB and wheezing     4. FEVER: \"Do you have a fever? \" If so, ask: \"What is your temperature, how was it measured, and when did it start? \"      Denies    5. HEMOPTYSIS: \"Are you coughing up any blood? \" If so ask: \"How much? \" (flecks, streaks, tablespoons, etc.)         Denies    6. TREATMENT: \"What have you done so far to treat the cough? \" (e.g., meds, fluids, humidifier)         OTC nyquil     7. CARDIAC HISTORY: \"Do you have any history of heart disease? \" (e.g., heart attack, congestive heart failure)           Denies    8. LUNG HISTORY: \"Do you have any history of lung disease? \"  (e.g., pulmonary embolus, asthma, emphysema)         Asthma- denies this as typical asthma symptoms with pins and needles or heaviness in chest     9. PE RISK FACTORS: \"Do you have a history of blood clots? \" (or: recent major surgery, recent prolonged travel, bedridden)          Denies    10. OTHER SYMPTOMS: \"Do you have any other symptoms? (e.g., runny nose, wheezing, chest pain)           Denies    11. PREGNANCY: \"Is there any chance you are pregnant? \" \"When was your last menstrual period? \"           Denies    12. TRAVEL: \"Have you traveled out of the country in the last month? \" (e.g., travel history, exposures)          Denies    Protocols used: [id number]    Patient called pre-service center [facility] with red flag complaint.      Brief description of triage: see above     Triage indicates for patient to be seen today for cough for one month     Care advice provided, patient verbalizes understanding; denies any other questions or concerns; instructed to call back for any new or worsening symptoms. Writer provided warm transfer to [place] [facility] for appointment scheduling. Attention Provider: Thank you for allowing me to participate in the care of your patient. The patient was connected to triage in response to information provided to the ECC. Please do not respond through this encounter as the response is not directed to a shared pool.

## 2020-12-07 ENCOUNTER — VIRTUAL VISIT (OUTPATIENT)
Dept: FAMILY MEDICINE CLINIC | Age: 30
End: 2020-12-07
Payer: COMMERCIAL

## 2020-12-07 ENCOUNTER — TELEPHONE (OUTPATIENT)
Dept: FAMILY MEDICINE CLINIC | Age: 30
End: 2020-12-07

## 2020-12-07 PROCEDURE — 99213 OFFICE O/P EST LOW 20 MIN: CPT | Performed by: FAMILY MEDICINE

## 2020-12-07 PROCEDURE — G8427 DOCREV CUR MEDS BY ELIG CLIN: HCPCS | Performed by: FAMILY MEDICINE

## 2020-12-07 RX ORDER — PREDNISONE 20 MG/1
40 TABLET ORAL DAILY
Qty: 10 TABLET | Refills: 0 | Status: SHIPPED | OUTPATIENT
Start: 2020-12-07 | End: 2020-12-12

## 2020-12-07 RX ORDER — ALBUTEROL SULFATE 90 UG/1
2 AEROSOL, METERED RESPIRATORY (INHALATION) DAILY PRN
Qty: 1 INHALER | Refills: 3 | Status: SHIPPED | OUTPATIENT
Start: 2020-12-07 | End: 2021-09-15 | Stop reason: SDUPTHER

## 2020-12-07 NOTE — PROGRESS NOTES
2020    This is a 26 y.o. female   Chief Complaint   Patient presents with    Cough     chest and back are starting to hurt from the coughing     HPI    Video visit for a cough   - present for about the past 5 weeks or so  - she has underlying asthma and this seems to be irritating it and causing it to worsen. More recently has noticed some mild wheezing and some pain with deep breaths  - no fever or chills  - over the weekend noted more SOB with exertion once her rescue inhaler broke and she was unable to use that    - denies hx of blood clots, no known family hx of blood clots. Denies being on any hormonal pills. No prolonged immobilization    - no loss of taste or smell. No known COVID contacts    Review of Systems   As per HPI, otherwise negative    Past Medical History:   Diagnosis Date    Abnormal Pap smear     Colposcopy; HPV.  Abnormal Pap smear of cervix     Colposcopy positive for HPV.  Abnormal umbilical cord     peripheral cord insertion. monitor growth.  ADHD (attention deficit hyperactivity disorder)     no meds currently.  Anxiety     Currently taking Fluoxitine    Asthma     uses daily inhaler and rescue inhaler PRN.  Bipolar 1 disorder (HCC)     was taking prozac early in pregnancy. switched to Quetiapine, Class C, consider weaning in 3rd trimester.  Diabetes mellitus ([facility] [address]     GDM-insulin dependent    Endometriosis     Febrile convulsions (simple), unspecified     Febrile seizure as an infant one time    History of chicken pox 87    HPV in female     Irritable bowel     Positive PPD, treated     negative chest xray.  Postpartum depression     Pre-eclampsia in third trimester 2012    with G1. Baseline PIH labs and 24 hour urine WNL with G2.    Thyroid disease     Hypothyroid. taking Synthroid. Past Surgical History:   Procedure Laterality Date    BREAST SURGERY      reduction     SECTION      LTCS for suspected macrosomia and PIH. Family History   Problem Relation Age of Onset    Mental Illness Father         bipolar    Heart Disease Father 43        MI x 3    Cancer Sister         cervical    Heart Failure Maternal Grandmother     Asthma Maternal Grandmother        Current Outpatient Medications   Medication Sig Dispense Refill    albuterol sulfate HFA (VENTOLIN HFA) 108 (90 Base) MCG/ACT inhaler Inhale 2 puffs into the lungs daily as needed for Wheezing or Shortness of Breath 1 Inhaler 3    predniSONE (DELTASONE) 20 MG tablet Take 2 tablets by mouth daily for 5 days 10 tablet 0    gabapentin (NEURONTIN) 100 MG capsule TAKE ONE TO THREE CAPSULES BY MOUTH ONCE NIGHTLY 90 capsule 0    QUEtiapine (SEROQUEL XR) 300 MG extended release tablet TAKE ONE TABLET BY MOUTH DAILY 90 tablet 0    levothyroxine (SYNTHROID) 25 MCG tablet TAKE ONE TABLET BY MOUTH DAILY 30 tablet 3    omeprazole (PRILOSEC) 40 MG delayed release capsule Take 1 capsule by mouth daily 30 capsule 5    albuterol (PROVENTIL) (2.5 MG/3ML) 0.083% nebulizer solution Take 3 mLs by nebulization every 6 hours as needed for Wheezing 50 vial 1    Cholecalciferol (VITAMIN D3) 250 MCG (01598 UT) CAPS Take 1 capsule by mouth daily 100 capsule 3    ketoconazole (NIZORAL) 2 % shampoo Apply to body, leave on about 15 minutes. May repeat in every 2 weeks. 2 Bottle 3    melatonin 3 MG TABS tablet Take 3 mg by mouth daily      ibuprofen (ADVIL;MOTRIN) 600 MG tablet Take 1 tablet by mouth every 8 hours as needed for Pain (Patient not taking: Reported on 12/7/2020) 21 tablet 0     No current facility-administered medications for this visit. There were no vitals taken for this visit. Physical Exam  Constitutional:       Appearance: She is well-developed. HENT:      Head: Normocephalic and atraumatic. Pulmonary:      Effort: Pulmonary effort is normal.   Skin:     Coloration: Skin is not pale.    Neurological:      Mental Status: She is alert and oriented to person, place, and time. Wt Readings from Last 3 Encounters:   10/28/20 215 lb (97.5 kg)   07/23/20 210 lb 9.6 oz (95.5 kg)   07/16/20 209 lb (94.8 kg)     BP Readings from Last 3 Encounters:   10/28/20 118/82   07/23/20 124/76   07/16/20 130/82     Assessment/Plan:  1. Mild persistent asthma with acute exacerbation  - albuterol sulfate HFA (VENTOLIN HFA) 108 (90 Base) MCG/ACT inhaler; Inhale 2 puffs into the lungs daily as needed for Wheezing or Shortness of Breath  Dispense: 1 Inhaler; Refill: 3  - predniSONE (DELTASONE) 20 MG tablet; Take 2 tablets by mouth daily for 5 days  Dispense: 10 tablet; Refill: 0    Cough for 5 weeks, worsening. Discussed can get COVID test and gave number since SOB has been much worse over weekend. Will treat for asthma exacerbation with steroids. Call for any problems with worsening breathing. Denies DVT risk factors. Melonei Espino is a 26 y.o. female being evaluated by a Virtual Visit (video visit) encounter to address concerns as mentioned above. A caregiver was present when appropriate. Due to this being a TeleHealth encounter (During [facility] public health emergency), evaluation of the following organ systems was limited: Vitals/Constitutional/EENT/Resp/CV/GI//MS/Neuro/Skin/Heme-Lymph-Imm. Pursuant to the emergency declaration under the [address] [place] [address] and the [employer] Act, this Virtual Visit was conducted with patient's (and/or legal guardian's) consent, to reduce the patient's risk of exposure to COVID-19 and provide necessary medical care. The patient (and/or legal guardian) has also been advised to contact this office for worsening conditions or problems, and seek emergency medical treatment and/or call 911 if deemed necessary.      Patient identification was verified at the start of the visit: yes  Total time spent for this encounter: not billed on time  Services were provided through a video synchronous discussion virtually to substitute for in-person clinic visit. Patient and provider were located at their individual homes. --Ileana Pearson MD on 12/7/2020 at 3:46 PM    An electronic signature was used to authenticate this note.

## 2020-12-07 NOTE — TELEPHONE ENCOUNTER
----- Message from [facility] sent at 12/4/2020  1:42 PM EST -----  Subject: Appointment Request    Reason for Call: Immediate Return from RN Triage    QUESTIONS  Type of Appointment? Established Patient  Reason for appointment request? No appointments available during search  Additional Information for Provider? Return from Nurse triage- red   scheduling   cough since a month and had to be seen today . Transferred to the office   . Call [phone] if not already scheduled. ---------------------------------------------------------------------------  --------------  Deannie Sandhoff INFO  What is the best way for the office to contact you? OK to leave message on   voicemail  Preferred Call Back Phone Number? [phone]  ---------------------------------------------------------------------------  --------------  SCRIPT ANSWERS  Patient needs to be seen today? Yes  Nurse Name? Roby Piper  (Did RN indicate the need for Red Scheduling?)?  Yes

## 2020-12-11 ENCOUNTER — TELEPHONE (OUTPATIENT)
Dept: FAMILY MEDICINE CLINIC | Age: 30
End: 2020-12-11

## 2020-12-11 DIAGNOSIS — J20.9 ACUTE BRONCHITIS, UNSPECIFIED ORGANISM: Primary | ICD-10-CM

## 2020-12-11 RX ORDER — AZITHROMYCIN 250 MG/1
TABLET, FILM COATED ORAL
Qty: 1 PACKET | Refills: 0 | Status: SHIPPED | OUTPATIENT
Start: 2020-12-11 | End: 2020-12-16

## 2020-12-11 NOTE — TELEPHONE ENCOUNTER
I CALLED THE PT SHE HAS NOT BEEN TESTED FOR COVID   PT HAS A COUGH AT NIGHT NOTHING COMING UP OR DRAINAGE BEEN GOING ON FOR 5 WEEKS   PT IS USING HER INHALER EVERYDAY HELPS A LITTLE   PT IS NOT HAVING ANY OTHER SYMPTOMS   PT HAS SOB DURING THE DAY A LITTLE BUT AT NIGHT HARD TO BREATHE   SHE WAS ON A STEROID DR DAY THOUGH THIS MAY BE HER ASTHMA   VERIFIED PHARMACY   PT TAKES NYQUIL COLD AND COUGH SO SHE GOING TO SLEEP

## 2020-12-11 NOTE — TELEPHONE ENCOUNTER
Patient called in stating that she had a VV with Dr. Jose A Teran on Monday for her cough and he gave her the 5 day steroid pack and told her to call back if she wasn't feeling better. She stated she is feeling worse and would like to talk to Dr. Mariela Disla if she can.     Please advise

## 2020-12-11 NOTE — TELEPHONE ENCOUNTER
Spoke with dr edwards and he thinks she should be tested for covid. Dr Gray Narvaez do you recommend any other antibiotics.   Please advise

## 2021-01-06 ENCOUNTER — APPOINTMENT (OUTPATIENT)
Dept: GENERAL RADIOLOGY | Age: 31
End: 2021-01-06
Payer: COMMERCIAL

## 2021-01-06 ENCOUNTER — HOSPITAL ENCOUNTER (EMERGENCY)
Age: 31
Discharge: HOME | End: 2021-01-06
Attending: EMERGENCY MEDICINE
Payer: COMMERCIAL

## 2021-01-06 VITALS
DIASTOLIC BLOOD PRESSURE: 83 MMHG | RESPIRATION RATE: 16 BRPM | HEART RATE: 93 BPM | WEIGHT: 213.63 LBS | TEMPERATURE: 98.8 F | HEIGHT: 66 IN | OXYGEN SATURATION: 98 % | BODY MASS INDEX: 34.33 KG/M2 | SYSTOLIC BLOOD PRESSURE: 123 MMHG

## 2021-01-06 DIAGNOSIS — J06.9 UPPER RESPIRATORY TRACT INFECTION, UNSPECIFIED TYPE: ICD-10-CM

## 2021-01-06 DIAGNOSIS — R05.9 COUGH: Primary | ICD-10-CM

## 2021-01-06 LAB
RAPID INFLUENZA  B AGN: NEGATIVE
RAPID INFLUENZA A AGN: NEGATIVE
SARS-COV-2: NOT DETECTED

## 2021-01-06 PROCEDURE — 6370000000 HC RX 637 (ALT 250 FOR IP): Performed by: EMERGENCY MEDICINE

## 2021-01-06 PROCEDURE — 99284 EMERGENCY DEPT VISIT MOD MDM: CPT

## 2021-01-06 PROCEDURE — U0003 INFECTIOUS AGENT DETECTION BY NUCLEIC ACID (DNA OR RNA); SEVERE ACUTE RESPIRATORY SYNDROME CORONAVIRUS 2 (SARS-COV-2) (CORONAVIRUS DISEASE [COVID-19]), AMPLIFIED PROBE TECHNIQUE, MAKING USE OF HIGH THROUGHPUT TECHNOLOGIES AS DESCRIBED BY CMS-2020-01-R: HCPCS

## 2021-01-06 PROCEDURE — 6360000002 HC RX W HCPCS: Performed by: EMERGENCY MEDICINE

## 2021-01-06 PROCEDURE — 71045 X-RAY EXAM CHEST 1 VIEW: CPT

## 2021-01-06 PROCEDURE — 87804 INFLUENZA ASSAY W/OPTIC: CPT

## 2021-01-06 RX ORDER — GUAIFENESIN, PSEUDOEPHEDRINE HYDROCHLORIDE 600; 60 MG/1; MG/1
1 TABLET, EXTENDED RELEASE ORAL EVERY 12 HOURS
Qty: 14 TABLET | Refills: 0 | Status: SHIPPED | OUTPATIENT
Start: 2021-01-06 | End: 2021-01-13

## 2021-01-06 RX ORDER — BENZONATATE 100 MG/1
100 CAPSULE ORAL ONCE
Status: COMPLETED | OUTPATIENT
Start: 2021-01-06 | End: 2021-01-06

## 2021-01-06 RX ORDER — DEXAMETHASONE SODIUM PHOSPHATE 4 MG/ML
6 INJECTION, SOLUTION INTRA-ARTICULAR; INTRALESIONAL; INTRAMUSCULAR; INTRAVENOUS; SOFT TISSUE ONCE
Status: COMPLETED | OUTPATIENT
Start: 2021-01-06 | End: 2021-01-06

## 2021-01-06 RX ORDER — BENZONATATE 100 MG/1
100 CAPSULE ORAL 3 TIMES DAILY PRN
Qty: 20 CAPSULE | Refills: 0 | Status: SHIPPED | OUTPATIENT
Start: 2021-01-06 | End: 2021-05-26

## 2021-01-06 RX ADMIN — DEXAMETHASONE SODIUM PHOSPHATE 6 MG: 4 INJECTION, SOLUTION INTRAMUSCULAR; INTRAVENOUS at 01:42

## 2021-01-06 RX ADMIN — BENZONATATE 100 MG: 100 CAPSULE ORAL at 01:21

## 2021-01-06 ASSESSMENT — ENCOUNTER SYMPTOMS
STRIDOR: 0
ABDOMINAL PAIN: 0
COUGH: 1
CHEST TIGHTNESS: 1
WHEEZING: 0
PHOTOPHOBIA: 0
COLOR CHANGE: 0
TROUBLE SWALLOWING: 0
SHORTNESS OF BREATH: 0
VOMITING: 0

## 2021-01-06 ASSESSMENT — PAIN SCALES - GENERAL: PAINLEVEL_OUTOF10: 0

## 2021-01-06 NOTE — ED PROVIDER NOTES
Positive for cough and chest tightness. Negative for shortness of breath, wheezing and stridor. Cardiovascular: Negative for chest pain, palpitations and leg swelling. Gastrointestinal: Negative for abdominal pain and vomiting. Genitourinary: Negative for difficulty urinating and frequency. Musculoskeletal: Negative for gait problem and neck pain. Skin: Negative for color change and rash. Neurological: Negative for dizziness, light-headedness and headaches. Psychiatric/Behavioral: Negative for confusion. The patient is not nervous/anxious. All other systems reviewed and are negative. Except as noted above the remainder of the review of systems was reviewed and negative. PAST MEDICAL HISTORY     Past Medical History:   Diagnosis Date    Abnormal Pap smear     Colposcopy; HPV.  Abnormal Pap smear of cervix     Colposcopy positive for HPV.  Abnormal umbilical cord     peripheral cord insertion. monitor growth.  ADHD (attention deficit hyperactivity disorder)     no meds currently.  Anxiety     Currently taking Fluoxitine    Asthma     uses daily inhaler and rescue inhaler PRN.  Bipolar 1 disorder (HCC)     was taking prozac early in pregnancy. switched to Quetiapine, Class C, consider weaning in 3rd trimester.  Diabetes mellitus ([facility] [address]     GDM-insulin dependent    Endometriosis     Febrile convulsions (simple), unspecified     Febrile seizure as an infant one time    History of chicken pox 87    HPV in female     Irritable bowel     Positive PPD, treated     negative chest xray.  Postpartum depression     Pre-eclampsia in third trimester 2012    with G1. Baseline PIH labs and 24 hour urine WNL with G2.    Thyroid disease     Hypothyroid. taking Synthroid.  Vertigo          SURGICALHISTORY       Past Surgical History:   Procedure Laterality Date    BREAST SURGERY      reduction     SECTION      LTCS for suspected macrosomia and PIH. CURRENT MEDICATIONS       Discharge Medication List as of 1/6/2021  2:50 AM      CONTINUE these medications which have NOT CHANGED    Details   albuterol sulfate HFA (VENTOLIN HFA) 108 (90 Base) MCG/ACT inhaler Inhale 2 puffs into the lungs daily as needed for Wheezing or Shortness of Breath, Disp-1 Inhaler,R-3Normal      gabapentin (NEURONTIN) 100 MG capsule TAKE ONE TO THREE CAPSULES BY MOUTH ONCE NIGHTLY, Disp-90 capsule, R-0Normal      QUEtiapine (SEROQUEL XR) 300 MG extended release tablet TAKE ONE TABLET BY MOUTH DAILY, Disp-90 tablet,R-0Normal      levothyroxine (SYNTHROID) 25 MCG tablet TAKE ONE TABLET BY MOUTH DAILY, Disp-30 tablet,R-3Normal      omeprazole (PRILOSEC) 40 MG delayed release capsule Take 1 capsule by mouth daily, Disp-30 capsule,R-5Normal      albuterol (PROVENTIL) (2.5 MG/3ML) 0.083% nebulizer solution Take 3 mLs by nebulization every 6 hours as needed for Wheezing, Disp-50 vial, R-1Normal      Cholecalciferol (VITAMIN D3) 250 MCG (21051 UT) CAPS Take 1 capsule by mouth daily, Disp-100 capsule, R-3Normal      ketoconazole (NIZORAL) 2 % shampoo Apply to body, leave on about 15 minutes. May repeat in every 2 weeks. , Disp-2 Bottle, R-3, Normal      melatonin 3 MG TABS tablet Take 3 mg by mouth dailyHistorical Med                  Latex, Kiwi extract, Tramadol, Zoloft [sertraline hcl], Fluoxetine, Shellfish-derived products, and Wellbutrin [bupropion hcl]    FAMILY HISTORY       Family History   Problem Relation Age of Onset    Mental Illness Father         bipolar    Heart Disease Father 43        MI x 3    Cancer Sister         cervical    Heart Failure Maternal Grandmother     Asthma Maternal Grandmother           SOCIAL HISTORY       Social History     Socioeconomic History    Marital status:      Spouse name: Maryse Castellanos Number of children: None    Years of education: None    Highest education level: None   Occupational History    None   Social Needs    Financial resource strain: None    Food insecurity     Worry: None     Inability: None    Transportation needs     Medical: None     Non-medical: None   Tobacco Use    Smoking status: Never Smoker    Smokeless tobacco: Never Used    Tobacco comment: congratulated on nonsmoking status. Substance and Sexual Activity    Alcohol use: No     Alcohol/week: 0.0 standard drinks    Drug use: No    Sexual activity: Yes     Partners: Male     Birth control/protection: I.U.D. Lifestyle    Physical activity     Days per week: None     Minutes per session: None    Stress: None   Relationships    Social connections     Talks on phone: None     Gets together: None     Attends Zoroastrianism service: None     Active member of club or organization: None     Attends meetings of clubs or organizations: None     Relationship status: None    Intimate partner violence     Fear of current or ex partner: None     Emotionally abused: None     Physically abused: None     Forced sexual activity: None   Other Topics Concern    None   Social History Narrative    Exercise: walks 7 time(s) per week    Seatbelt use: Always    Sexual activity: single partner, contraception - IUD             SCREENINGS             PHYSICAL EXAM    (up to 7 for level 4, 8 or more for level 5)     ED Triage Vitals [01/06/21 0111]   BP Temp Temp Source Pulse Resp SpO2 Height Weight   (!) 148/104 98.8 °F (37.1 °C) Oral 105 16 98 % 5' 6\" (1.676 m) 213 lb 10 oz (96.9 kg)       Physical Exam  Vitals signs and nursing note reviewed. Constitutional:       Appearance: She is well-developed. HENT:      Head: Normocephalic and atraumatic. Mouth/Throat:      Mouth: Mucous membranes are moist.   Eyes:      Extraocular Movements: Extraocular movements intact. Conjunctiva/sclera: Conjunctivae normal.      Pupils: Pupils are equal, round, and reactive to light. Neck:      Musculoskeletal: Normal range of motion. Trachea: No tracheal deviation.    Cardiovascular: Rate and Rhythm: Normal rate and regular rhythm. Heart sounds: Normal heart sounds. Pulmonary:      Effort: Pulmonary effort is normal. No respiratory distress. Breath sounds: Normal breath sounds. No wheezing, rhonchi or rales. Abdominal:      General: There is no distension. Palpations: Abdomen is soft. Tenderness: There is no abdominal tenderness. There is no guarding. Musculoskeletal: Normal range of motion. Skin:     General: Skin is warm and dry. Capillary Refill: Capillary refill takes less than 2 seconds. Neurological:      General: No focal deficit present. Mental Status: She is alert and oriented to person, place, and time. RESULTS     EKG: All EKG's are interpreted by the Emergency Department Physician who either signs or Co-signsthis chart in the absence of a cardiologist.      RADIOLOGY:   Non-plain filmimages such as CT, Ultrasound and MRI are read by the radiologist. Plain radiographic images are visualized and preliminarily interpreted by the emergency physician with the below findings:      Interpretation per the Radiologist below, if available at the time ofthis note:    XR CHEST PORTABLE   Final Result   Negative portable chest.               ED BEDSIDE ULTRASOUND:   Performed by ED Physician - none    LABS:  Labs Reviewed   RAPID INFLUENZA A/B ANTIGENS    Narrative:     Performed at:  [address]   Phone [phone]       All other labs were within normal range or not returned as of this dictation.     EMERGENCY DEPARTMENT COURSE and DIFFERENTIAL DIAGNOSIS/MDM:   Vitals:    Vitals:    01/06/21 0111 01/06/21 0236   BP: (!) 148/104 123/83   Pulse: 105 93   Resp: 16 16   Temp: 98.8 °F (37.1 °C)    TempSrc: Oral    SpO2: 98% 98%   Weight: 213 lb 10 oz (96.9 kg)    Height: 5' 6\" (1.676 m)        Patient was given 1. Cough    2.  Upper respiratory tract infection, unspecified type          DISPOSITION/PLAN   DISPOSITION Decision To Discharge 01/06/2021 01:37:58 AM      PATIENT REFERREDTO:  Dipesh Ghosh MD  [address] [place] [address]  [phone]    In 3 days        DISCHARGEMEDICATIONS:  Discharge Medication List as of 1/6/2021  2:50 AM      START taking these medications    Details   pseudoephedrine-guaiFENesin (MUCINEX D)  MG per extended release tablet Take 1 tablet by mouth every 12 hours for 7 days, Disp-14 tablet, R-0Print      benzonatate (TESSALON PERLES) 100 MG capsule Take 1 capsule by mouth 3 times daily as needed for Cough, Disp-20 capsule, R-0Print                (Please note that portions of this note were completed with a voice recognition program.  Efforts were made to edit the dictations but occasionally words are mis-transcribed.)    Robin Engel MD (electronically signed)  Attending Emergency Physician          Robin Engel MD  01/06/21 1396

## 2021-01-07 ENCOUNTER — CARE COORDINATION (OUTPATIENT)
Dept: CARE COORDINATION | Age: 31
End: 2021-01-07

## 2021-01-07 NOTE — CARE COORDINATION
Patient contacted regarding recent visit for viral symptoms. This [facility] contacted the patient by telephone to perform post discharge call. Call within 2 business days of discharge: Yes     Placed outreach call to pt regarding ED visit on 1.6. 21. Left message to return call, will attempt to reach pt again.     Ricky Hale  [phone]

## 2021-01-08 NOTE — CARE COORDINATION
Patient contacted regarding recent visit for viral symptoms. This [facility] contacted the patient by telephone to perform post discharge call. Call within 2 business days of discharge: Yes     Placed another outreach call to pt regarding ED visit on 1.6. 21. Left message to return call, no further attempts will be made.     Elías Davey  [phone]

## 2021-01-14 DIAGNOSIS — F33.1 MODERATE EPISODE OF RECURRENT MAJOR DEPRESSIVE DISORDER (HCC): ICD-10-CM

## 2021-01-14 DIAGNOSIS — F32.89 ATYPICAL DEPRESSION: ICD-10-CM

## 2021-01-14 RX ORDER — QUETIAPINE 300 MG/1
TABLET, FILM COATED, EXTENDED RELEASE ORAL
Qty: 90 TABLET | Refills: 0 | Status: SHIPPED | OUTPATIENT
Start: 2021-01-14 | End: 2021-04-16

## 2021-02-22 DIAGNOSIS — G25.81 RESTLESS LEGS SYNDROME (RLS): ICD-10-CM

## 2021-02-24 RX ORDER — GABAPENTIN 100 MG/1
CAPSULE ORAL
Qty: 90 CAPSULE | Refills: 3 | Status: SHIPPED | OUTPATIENT
Start: 2021-02-24 | End: 2021-07-01

## 2021-03-03 ENCOUNTER — HOSPITAL ENCOUNTER (OUTPATIENT)
Dept: GENERAL RADIOLOGY | Age: 31
Discharge: HOME | End: 2021-03-03
Payer: COMMERCIAL

## 2021-03-03 ENCOUNTER — HOSPITAL ENCOUNTER (OUTPATIENT)
Age: 31
Discharge: HOME | End: 2021-03-03
Payer: COMMERCIAL

## 2021-03-03 ENCOUNTER — TELEPHONE (OUTPATIENT)
Dept: FAMILY MEDICINE CLINIC | Age: 31
End: 2021-03-03

## 2021-03-03 ENCOUNTER — OFFICE VISIT (OUTPATIENT)
Dept: FAMILY MEDICINE CLINIC | Age: 31
End: 2021-03-03
Payer: COMMERCIAL

## 2021-03-03 VITALS
DIASTOLIC BLOOD PRESSURE: 74 MMHG | RESPIRATION RATE: 16 BRPM | SYSTOLIC BLOOD PRESSURE: 122 MMHG | BODY MASS INDEX: 34.31 KG/M2 | HEIGHT: 66 IN | WEIGHT: 213.5 LBS | TEMPERATURE: 97.4 F | HEART RATE: 112 BPM | OXYGEN SATURATION: 98 %

## 2021-03-03 DIAGNOSIS — R10.10 UPPER ABDOMINAL PAIN: ICD-10-CM

## 2021-03-03 DIAGNOSIS — R10.10 UPPER ABDOMINAL PAIN: Primary | ICD-10-CM

## 2021-03-03 DIAGNOSIS — R07.9 CHEST PAIN IN ADULT: ICD-10-CM

## 2021-03-03 DIAGNOSIS — K58.0 IRRITABLE BOWEL SYNDROME WITH DIARRHEA: ICD-10-CM

## 2021-03-03 LAB
BASOPHILS ABSOLUTE: 0 K/UL (ref 0–0.2)
BASOPHILS RELATIVE PERCENT: 0.5 %
EOSINOPHILS ABSOLUTE: 0.1 K/UL (ref 0–0.6)
EOSINOPHILS RELATIVE PERCENT: 1.4 %
HCT VFR BLD CALC: 43 % (ref 36–48)
HEMOGLOBIN: 14.4 G/DL (ref 12–16)
LYMPHOCYTES ABSOLUTE: 2.3 K/UL (ref 1–5.1)
LYMPHOCYTES RELATIVE PERCENT: 28.5 %
MCH RBC QN AUTO: 29 PG (ref 26–34)
MCHC RBC AUTO-ENTMCNC: 33.5 G/DL (ref 31–36)
MCV RBC AUTO: 86.7 FL (ref 80–100)
MONOCYTES ABSOLUTE: 0.6 K/UL (ref 0–1.3)
MONOCYTES RELATIVE PERCENT: 6.8 %
NEUTROPHILS ABSOLUTE: 5.2 K/UL (ref 1.7–7.7)
NEUTROPHILS RELATIVE PERCENT: 62.8 %
PDW BLD-RTO: 13.2 % (ref 12.4–15.4)
PLATELET # BLD: 253 K/UL (ref 135–450)
PMV BLD AUTO: 9.5 FL (ref 5–10.5)
RBC # BLD: 4.96 M/UL (ref 4–5.2)
WBC # BLD: 8.2 K/UL (ref 4–11)

## 2021-03-03 PROCEDURE — 74019 RADEX ABDOMEN 2 VIEWS: CPT

## 2021-03-03 PROCEDURE — G8484 FLU IMMUNIZE NO ADMIN: HCPCS | Performed by: FAMILY MEDICINE

## 2021-03-03 PROCEDURE — 1036F TOBACCO NON-USER: CPT | Performed by: FAMILY MEDICINE

## 2021-03-03 PROCEDURE — G8417 CALC BMI ABV UP PARAM F/U: HCPCS | Performed by: FAMILY MEDICINE

## 2021-03-03 PROCEDURE — G8427 DOCREV CUR MEDS BY ELIG CLIN: HCPCS | Performed by: FAMILY MEDICINE

## 2021-03-03 PROCEDURE — 99214 OFFICE O/P EST MOD 30 MIN: CPT | Performed by: FAMILY MEDICINE

## 2021-03-03 PROCEDURE — 93000 ELECTROCARDIOGRAM COMPLETE: CPT | Performed by: FAMILY MEDICINE

## 2021-03-03 RX ORDER — DICYCLOMINE HCL 20 MG
20 TABLET ORAL 4 TIMES DAILY
Qty: 120 TABLET | Refills: 0 | Status: SHIPPED | OUTPATIENT
Start: 2021-03-03 | End: 2021-03-30

## 2021-03-03 NOTE — PATIENT INSTRUCTIONS
INSTRUCTIONS  · PLEASE GET BLOODWORK DRAWN TODAY ON FIRST FLOOR in 170. Take orders with you. RESULTS- most blood tests back in couple days. We will call you if any problems. If bloodwork good, you will get letter in mail or notified thru [address] (if signed up) within 2 weeks. If you do not, please call office. · Get x-ray. Can walk in to [facility] to get the x-ray. No appointment needed.   · Start bentyl for IBS

## 2021-03-03 NOTE — PROGRESS NOTES
PROBLEM VISIT NOTE     Subjective:     Chief Complaint   Patient presents with    Other     Patient has been very bloated x3 days and now it is moving upwards to left chest axilla and and shoulder area. She states it feels like gas pains. Mehnaz Pena is a 26 y.o. female who presents bloating started 2 d ago despite regular BM. Tried exlax and lots BM yesterday. Gas X no help. Today having pain in chest and left shoulder and axilla more that right right. LMP 3 weeks ago    Patient's medications, allergies, past medical, and social histories were reviewed and updated as appropriate (see below). Review of Systems   General ROS: fever? No,    Night sweats? No  Endocrine ROS:malaise/lethargy? No   Unexpected weight changes? No  Respiratory ROS: cough? No   Shortness of breath? No, pain upper abdomen with deep breath  Cardiovascular ROS:chest pain? No   Shortness of breath with exertion? No  Gastrointestinal ROS: abdominal pain? Yes   Change in stools? Yes due to exlax  Genito-Urinary ROS: painful urination? No   Trouble voiding? No    HISTORY:  Patient's medications, allergies, past medical, and social histories were reviewed and updated as appropriate. CHART REVIEW  Health Maintenance   Topic Date Due    Varicella vaccine (1 of 2 - 2-dose childhood series) Never done    Flu vaccine (1) 09/01/2020    TSH testing  06/03/2021    Cervical cancer screen  06/03/2025    DTaP/Tdap/Td vaccine (8 - Td) 08/11/2028    Hepatitis B vaccine  Completed    Hib vaccine  Completed    Pneumococcal 0-64 years Vaccine  Completed    Hepatitis C screen  Completed    HIV screen  Completed    Hepatitis A vaccine  Aged Out    Meningococcal (ACWY) vaccine  Aged Out     The ASCVD Risk score (Rose Mary Rendon, et al., 2013) failed to calculate for the following reasons: The 2013 ASCVD risk score is only valid for ages 36 to 78  Prior to Visit Medications    Medication Sig Taking?  Authorizing Provider dicyclomine (BENTYL) 20 MG tablet Take 1 tablet by mouth 4 times daily Yes Mary Garvin MD   gabapentin (NEURONTIN) 100 MG capsule TAKE ONE TO THREE CAPSULES BY MOUTH ONCE NIGHTLY Yes Mary Garvin MD   QUEtiapine (SEROQUEL XR) 300 MG extended release tablet TAKE ONE TABLET BY MOUTH DAILY Yes Mary Garvin MD   albuterol sulfate HFA (VENTOLIN HFA) 108 (90 Base) MCG/ACT inhaler Inhale 2 puffs into the lungs daily as needed for Wheezing or Shortness of Breath Yes Bekah Chaudhry MD   levothyroxine (SYNTHROID) 25 MCG tablet TAKE ONE TABLET BY MOUTH DAILY Yes Mary Garvin MD   omeprazole (PRILOSEC) 40 MG delayed release capsule Take 1 capsule by mouth daily Yes Mary Garvin MD   albuterol (PROVENTIL) (2.5 MG/3ML) 0.083% nebulizer solution Take 3 mLs by nebulization every 6 hours as needed for Wheezing Yes Mary Garvin MD   Cholecalciferol (VITAMIN D3) 250 MCG (38410 UT) CAPS Take 1 capsule by mouth daily Yes Mary Garvin MD   melatonin 3 MG TABS tablet Take 3 mg by mouth daily Yes Historical Provider, MD   benzonatate (TESSALON PERLES) 100 MG capsule Take 1 capsule by mouth 3 times daily as needed for Cough  Patient not taking: Reported on 3/3/2021  Jose Mccormick MD   ketoconazole (NIZORAL) 2 % shampoo Apply to body, leave on about 15 minutes. May repeat in every 2 weeks. Patient not taking: Reported on 3/3/2021  Mary Garvin MD      Family History   Problem Relation Age of Onset    Mental Illness Father         bipolar    Heart Disease Father [address] x 3    Cancer Sister         cervical    Heart Failure Maternal Grandmother     Asthma Maternal Grandmother      Social History     Tobacco Use    Smoking status: Never Smoker    Smokeless tobacco: Never Used    Tobacco comment: congratulated on nonsmoking status.    Substance Use Topics    Alcohol use: No     Alcohol/week: 0.0 standard drinks    Drug use: No      LAST LABS  Cholesterol, Total   Date Value Ref Range Status · clear to auscultation bilaterally and good air movement  CARDIOVASC:   · regular rate and rhythm, S1, S2 normal. No murmurs noted. · LEGS:  Lower extremity edema: none    ABDOMEN:   · Soft, mild tender epigastric, no masses  · No hepatosplenomegaly  · No hernias noted. SKIN: warm and dry  · No rashes or suspicious lesions  · No nodules or induration    EKG: normal EKG, normal sinus rhythm. No acute changes. Assessment and Plan:      Diagnosis Orders   1. Upper abdominal pain  Comprehensive Metabolic Panel    CBC Auto Differential    XR ABDOMEN (2 VIEWS)    Lipase   2. Chest pain in adult  EKG 12 Lead   3. Irritable bowel syndrome with diarrhea  dicyclomine (BENTYL) 20 MG tablet   Plan below. Doubt cardiac, possible SBO or mild pancreatitis, suspect IBS flare    INSTRUCTIONS  · Please schedule annual complete physical (30 minutes) in 6 weeks. · PLEASE GET BLOODWORK DRAWN TODAY ON FIRST FLOOR in 170. Take orders with you. RESULTS- most blood tests back in couple days. We will call you if any problems. If bloodwork good, you will get letter in mail or notified thru [address] (if signed up) within 2 weeks. If you do not, please call office. · Get x-ray. Can walk in to [facility] to get the x-ray. No appointment needed.   · Start bentyl for IBS

## 2021-03-04 DIAGNOSIS — E03.9 ACQUIRED HYPOTHYROIDISM: ICD-10-CM

## 2021-03-04 LAB
A/G RATIO: 1.4 (ref 1.1–2.2)
ALBUMIN SERPL-MCNC: 4.2 G/DL (ref 3.4–5)
ALP BLD-CCNC: 81 U/L (ref 40–129)
ALT SERPL-CCNC: 21 U/L (ref 10–40)
ANION GAP SERPL CALCULATED.3IONS-SCNC: 9 MMOL/L (ref 3–16)
AST SERPL-CCNC: 21 U/L (ref 15–37)
BILIRUB SERPL-MCNC: <0.2 MG/DL (ref 0–1)
BUN BLDV-MCNC: 11 MG/DL (ref 7–20)
CALCIUM SERPL-MCNC: 9.5 MG/DL (ref 8.3–10.6)
CHLORIDE BLD-SCNC: 102 MMOL/L (ref 99–110)
CO2: 29 MMOL/L (ref 21–32)
CREAT SERPL-MCNC: 0.8 MG/DL (ref 0.6–1.1)
GFR AFRICAN AMERICAN: >60
GFR NON-AFRICAN AMERICAN: >60
GLOBULIN: 3.1 G/DL
GLUCOSE BLD-MCNC: 110 MG/DL (ref 70–99)
LIPASE: 27 U/L (ref 13–60)
POTASSIUM SERPL-SCNC: 4.7 MMOL/L (ref 3.5–5.1)
SODIUM BLD-SCNC: 140 MMOL/L (ref 136–145)
TOTAL PROTEIN: 7.3 G/DL (ref 6.4–8.2)

## 2021-03-04 RX ORDER — LEVOTHYROXINE SODIUM 0.03 MG/1
TABLET ORAL
Qty: 30 TABLET | Refills: 3 | Status: SHIPPED | OUTPATIENT
Start: 2021-03-04 | End: 2021-06-30

## 2021-03-28 DIAGNOSIS — K21.9 GASTROESOPHAGEAL REFLUX DISEASE WITHOUT ESOPHAGITIS: ICD-10-CM

## 2021-03-29 RX ORDER — OMEPRAZOLE 40 MG/1
CAPSULE, DELAYED RELEASE ORAL
Qty: 30 CAPSULE | Refills: 4 | Status: SHIPPED | OUTPATIENT
Start: 2021-03-29 | End: 2021-08-26

## 2021-03-30 DIAGNOSIS — K58.0 IRRITABLE BOWEL SYNDROME WITH DIARRHEA: ICD-10-CM

## 2021-03-30 RX ORDER — DICYCLOMINE HCL 20 MG
TABLET ORAL
Qty: 120 TABLET | Refills: 2 | Status: SHIPPED | OUTPATIENT
Start: 2021-03-30 | End: 2022-03-30 | Stop reason: SDUPTHER

## 2021-04-15 DIAGNOSIS — F32.89 ATYPICAL DEPRESSION: ICD-10-CM

## 2021-04-15 DIAGNOSIS — F33.1 MODERATE EPISODE OF RECURRENT MAJOR DEPRESSIVE DISORDER (HCC): ICD-10-CM

## 2021-04-16 RX ORDER — QUETIAPINE 300 MG/1
TABLET, FILM COATED, EXTENDED RELEASE ORAL
Qty: 90 TABLET | Refills: 1 | Status: ON HOLD
Start: 2021-04-16 | End: 2021-08-23 | Stop reason: HOSPADM

## 2021-05-03 ENCOUNTER — TELEPHONE (OUTPATIENT)
Dept: FAMILY MEDICINE CLINIC | Age: 31
End: 2021-05-03

## 2021-05-10 ENCOUNTER — TELEPHONE (OUTPATIENT)
Dept: FAMILY MEDICINE CLINIC | Age: 31
End: 2021-05-10

## 2021-05-10 NOTE — TELEPHONE ENCOUNTER
Pt called in wanting to know if Dr. Salvatore Persaud could refer her to a weight loss clinic through [facility].     Please Advise

## 2021-05-12 ENCOUNTER — TELEPHONE (OUTPATIENT)
Dept: FAMILY MEDICINE CLINIC | Age: 31
End: 2021-05-12

## 2021-05-12 RX ORDER — FLUCONAZOLE 150 MG/1
150 TABLET ORAL ONCE
Qty: 2 TABLET | Refills: 0 | Status: SHIPPED | OUTPATIENT
Start: 2021-05-12 | End: 2021-05-12

## 2021-05-12 NOTE — TELEPHONE ENCOUNTER
Pt called in stating she went to an urgent care last week for a ear infection. She thinks she has a yeast infection because of the antibiotic. She says it happens every time she is on an antibiotic. Can she get something for the yeast infection?     Please Advise

## 2021-05-26 ENCOUNTER — OFFICE VISIT (OUTPATIENT)
Dept: FAMILY MEDICINE CLINIC | Age: 31
End: 2021-05-26
Payer: COMMERCIAL

## 2021-05-26 VITALS
BODY MASS INDEX: 34.55 KG/M2 | OXYGEN SATURATION: 98 % | HEART RATE: 110 BPM | HEIGHT: 66 IN | WEIGHT: 215 LBS | DIASTOLIC BLOOD PRESSURE: 84 MMHG | RESPIRATION RATE: 16 BRPM | SYSTOLIC BLOOD PRESSURE: 120 MMHG

## 2021-05-26 DIAGNOSIS — R73.9 HYPERGLYCEMIA: ICD-10-CM

## 2021-05-26 DIAGNOSIS — F33.1 MODERATE EPISODE OF RECURRENT MAJOR DEPRESSIVE DISORDER (HCC): ICD-10-CM

## 2021-05-26 DIAGNOSIS — K58.0 IRRITABLE BOWEL SYNDROME WITH DIARRHEA: ICD-10-CM

## 2021-05-26 DIAGNOSIS — E03.9 ACQUIRED HYPOTHYROIDISM: ICD-10-CM

## 2021-05-26 DIAGNOSIS — J45.40 MODERATE PERSISTENT ASTHMA WITHOUT COMPLICATION: ICD-10-CM

## 2021-05-26 DIAGNOSIS — Z00.00 WELL ADULT HEALTH CHECK: Primary | ICD-10-CM

## 2021-05-26 DIAGNOSIS — E78.1 HYPERTRIGLYCERIDEMIA: ICD-10-CM

## 2021-05-26 DIAGNOSIS — G47.33 OSA (OBSTRUCTIVE SLEEP APNEA): ICD-10-CM

## 2021-05-26 DIAGNOSIS — K21.9 GASTROESOPHAGEAL REFLUX DISEASE WITHOUT ESOPHAGITIS: ICD-10-CM

## 2021-05-26 LAB
CHOLESTEROL, TOTAL: 186 MG/DL (ref 0–199)
HDLC SERPL-MCNC: 39 MG/DL (ref 40–60)
LDL CHOLESTEROL CALCULATED: 113 MG/DL
TRIGL SERPL-MCNC: 170 MG/DL (ref 0–150)
TSH SERPL DL<=0.05 MIU/L-ACNC: 1.16 UIU/ML (ref 0.27–4.2)
VLDLC SERPL CALC-MCNC: 34 MG/DL

## 2021-05-26 PROCEDURE — 99395 PREV VISIT EST AGE 18-39: CPT | Performed by: FAMILY MEDICINE

## 2021-05-26 NOTE — PATIENT INSTRUCTIONS
INSTRUCTIONS  · NEXT APPOINTMENT: Please schedule check-up in 6 months. · PLEASE TAKE THIS FORM TO CHECK-OUT WINDOW TO SCHEDULE NEXT VISIT. · PLEASE GET BLOODWORK DRAWN TODAY ON FIRST FLOOR in 170. Take orders with you. RESULTS- most blood tests back in couple days. We will call you if any problems. If bloodwork good, you will get letter in mail or notified thru [address] (if signed up) within 2 weeks. If you do not, please call office. · Please get flu vaccine when available in fall. Can get either at this office or at stores such as [employer].   COVID VACCINE- call [employer] Vaccine Hotline  [phone]  · See sleep specialist.

## 2021-05-26 NOTE — PROGRESS NOTES
The ASCVD Risk score (Nena Alcaraz et al., 2013) failed to calculate for the following reasons: The 2013 ASCVD risk score is only valid for ages 36 to 78  Prior to Visit Medications    Medication Sig Taking? Authorizing Provider   QUEtiapine (SEROQUEL XR) 300 MG extended release tablet TAKE ONE TABLET BY MOUTH DAILY Yes Danyel Jonas MD   dicyclomine (BENTYL) 20 MG tablet TAKE ONE TABLET BY MOUTH FOUR TIMES A DAY Yes Danyel Jonas MD   omeprazole (PRILOSEC) 40 MG delayed release capsule TAKE ONE CAPSULE BY MOUTH DAILY Yes Danyel Jonas MD   levothyroxine (SYNTHROID) 25 MCG tablet TAKE ONE TABLET BY MOUTH DAILY Yes Danyel Jonas MD   gabapentin (NEURONTIN) 100 MG capsule TAKE ONE TO THREE CAPSULES BY MOUTH ONCE NIGHTLY Yes Danyel Jonas MD   albuterol sulfate HFA (VENTOLIN HFA) 108 (90 Base) MCG/ACT inhaler Inhale 2 puffs into the lungs daily as needed for Wheezing or Shortness of Breath Yes Cheng Chaudhry MD   albuterol (PROVENTIL) (2.5 MG/3ML) 0.083% nebulizer solution Take 3 mLs by nebulization every 6 hours as needed for Wheezing Yes Danyel Jonas MD   Cholecalciferol (VITAMIN D3) 250 MCG (43798 UT) CAPS Take 1 capsule by mouth daily Yes Danyel Jonas MD   melatonin 3 MG TABS tablet Take 3 mg by mouth daily Yes Historical Provider, MD      Family History   Problem Relation Age of Onset    Mental Illness Father         bipolar    Heart Disease Father 43        MI x 3    Cancer Sister         cervical    Heart Failure Maternal Grandmother     Asthma Maternal Grandmother      Social History     Tobacco Use    Smoking status: Never Smoker    Smokeless tobacco: Never Used    Tobacco comment: congratulated on nonsmoking status.    Vaping Use    Vaping Use: Never used   Substance Use Topics    Alcohol use: No     Alcohol/week: 0.0 standard drinks    Drug use: No      LAST LABS  Cholesterol, Total   Date Value Ref Range Status   10/01/2019 175 0 - 199 mg/dL Final     LDL Calculated   Date Value Ref Range Status   10/01/2019 see below <100 mg/dL Final     Comment:     When the triglyceride is <30 mg/dL or >300 mg/dL the  calculated LDL and  VLDL are not valid and a measured  LDL is performed. HDL   Date Value Ref Range Status   10/01/2019 42 40 - 60 mg/dL Final     Triglycerides   Date Value Ref Range Status   10/01/2019 301 (H) 0 - 150 mg/dL Final     Lab Results   Component Value Date    GLUCOSE 110 (H) 03/03/2021     Lab Results   Component Value Date     03/03/2021    K 4.7 03/03/2021    CREATININE 0.8 03/03/2021     Lab Results   Component Value Date    WBC 8.2 03/03/2021    HGB 14.4 03/03/2021    HCT 43.0 03/03/2021    MCV 86.7 03/03/2021     03/03/2021     Lab Results   Component Value Date    ALT 21 03/03/2021    AST 21 03/03/2021    ALKPHOS 81 03/03/2021    BILITOT <0.2 03/03/2021     TSH (uIU/mL)   Date Value   06/03/2020 1.47     Lab Results   Component Value Date    LABA1C 5.3 10/01/2019     Objective:   PHYSICAL EXAM   /84 (Site: Right Upper Arm, Position: Sitting, Cuff Size: Large Adult)   Pulse 110   Resp 16   Ht 5' 6\" (1.676 m)   Wt 215 lb (97.5 kg)   SpO2 98%   BMI 34.70 kg/m²   BP Readings from Last 5 Encounters:   05/26/21 120/84   03/03/21 122/74   01/06/21 123/83   10/28/20 118/82   07/23/20 124/76     Wt Readings from Last 5 Encounters:   05/26/21 215 lb (97.5 kg)   03/03/21 213 lb 8 oz (96.8 kg)   01/06/21 213 lb 10 oz (96.9 kg)   10/28/20 215 lb (97.5 kg)   07/23/20 210 lb 9.6 oz (95.5 kg)      GENERAL:   · well-developed, well-nourished, alert, no distress. EYES:   · External findings: lids and lashes normal and conjunctivae and sclerae normal  · Eyes: no periorbital cellulitis.   ENT:   · External nose and ears appear normal  · normal TM's and external ear canals both ears  · Pharynx: normal. Exudates: None  · Lips, mucosa, and tongue normal  · Hearing grossly normal.     NECK:   · Supple, symmetrical, trachea midline  · Thyroid not enlarged, symmetric, no problems. If bloodwork good, you will get letter in mail or notified thru [address] (if signed up) within 2 weeks. If you do not, please call office. · Please get flu vaccine when available in fall. Can get either at this office or at stores such as [employer].   COVID VACCINE- call [employer] Hotline  [phone]  · See sleep specialist.

## 2021-05-27 LAB
ESTIMATED AVERAGE GLUCOSE: 119.8 MG/DL
HBA1C MFR BLD: 5.8 %

## 2021-06-03 PROBLEM — Z33.1 PREGNANCY, INCIDENTAL: Status: RESOLVED | Noted: 2018-05-02 | Resolved: 2021-06-03

## 2021-06-03 PROBLEM — R73.03 PREDIABETES: Status: ACTIVE | Noted: 2018-06-13

## 2021-06-14 ENCOUNTER — OFFICE VISIT (OUTPATIENT)
Dept: BARIATRICS/WEIGHT MGMT | Age: 31
End: 2021-06-14

## 2021-06-14 VITALS
SYSTOLIC BLOOD PRESSURE: 133 MMHG | BODY MASS INDEX: 34.55 KG/M2 | WEIGHT: 215 LBS | HEART RATE: 114 BPM | DIASTOLIC BLOOD PRESSURE: 87 MMHG | HEIGHT: 66 IN

## 2021-06-14 DIAGNOSIS — E66.9 OBESITY (BMI 30-39.9): Primary | ICD-10-CM

## 2021-06-14 PROCEDURE — 99999 PR OFFICE/OUTPT VISIT,PROCEDURE ONLY: CPT

## 2021-06-15 ENCOUNTER — TELEMEDICINE (OUTPATIENT)
Dept: BARIATRICS/WEIGHT MGMT | Age: 31
End: 2021-06-15
Payer: COMMERCIAL

## 2021-06-15 DIAGNOSIS — E66.9 CLASS 1 OBESITY: Primary | ICD-10-CM

## 2021-06-15 DIAGNOSIS — Z71.3 DIETARY COUNSELING AND SURVEILLANCE: ICD-10-CM

## 2021-06-15 DIAGNOSIS — E03.9 HYPOTHYROIDISM, UNSPECIFIED TYPE: ICD-10-CM

## 2021-06-15 PROCEDURE — G8427 DOCREV CUR MEDS BY ELIG CLIN: HCPCS | Performed by: FAMILY MEDICINE

## 2021-06-15 PROCEDURE — 99213 OFFICE O/P EST LOW 20 MIN: CPT | Performed by: FAMILY MEDICINE

## 2021-06-15 ASSESSMENT — ENCOUNTER SYMPTOMS
APNEA: 0
NAUSEA: 0
ABDOMINAL PAIN: 0
VOMITING: 0
COUGH: 0
CHEST TIGHTNESS: 0
SHORTNESS OF BREATH: 0
CHOKING: 0
CONSTIPATION: 0
PHOTOPHOBIA: 0
ABDOMINAL DISTENTION: 0
DIARRHEA: 0
EYE PAIN: 0
WHEEZING: 0
BLOOD IN STOOL: 0

## 2021-06-15 NOTE — PROGRESS NOTES
Patient: Cj Dinh     Encounter Date: 6/15/2021    YOB: 1990               Age: 26 y.o. Patient identification was verified at the start of the visit. Patient-Reported Vitals 12/7/2020   Patient-Reported Weight 210   Patient-Reported Height 5'6\"         BP Readings from Last 1 Encounters:   06/14/21 133/87       BMI Readings from Last 1 Encounters:   06/14/21 34.70 kg/m²       Pulse Readings from Last 1 Encounters:   06/14/21 114                                              Wt Readings from Last 3 Encounters:   06/14/21 215 lb (97.5 kg)   05/26/21 215 lb (97.5 kg)   03/03/21 213 lb 8 oz (96.8 kg)       Chief Complaint   Patient presents with    Bariatric, Initial Visit     [facility]       HPI:    26 y.o. female presents to establish care via video visit. She was referred by Roberta Lange MD for weight management. The patient's medical history is significant for class I obesity. The patient has a long-standing history of obesity which started gradually. The problem is mild. The patient has been gaining weight. Risk factors include annual weight gain of >2 lbs (1 kg)/ year and sedentary lifestyle. Aggravating factors include poor diet and lack of physical activity. The patient has tried various diet/exercise plans which have been ineffective in the long-run. She is motivated to start losing weight to help improve her overall health, including improvement in her energy level. When did you become overweight? [] Childhood   [] Teens   [x] Adulthood   [] Pregnancy   [] Menopause    Highest adult weight: Current    Triggers for weight gain? [] Stress   [] Illness   [] Medications   [] Travel  []Injury     [] Nightshift work   [] Insomnia  [] No specific triggers   [x] Other: Pregnancy     Food triggers:   [x] Stress   [x] Boredom   [] Fast food   [] Eating out   [] Seeking reward   [] Social     Have you ever taken medications to help you lose weight?    [] Yes  [x] No    Have you ever been on a prescribed meal replacement program?  [] Yes  [x] No        Dietitian's assessment reviewed and addressed with patient. Reviewed:  [x] Nutrition and the importance of regular protein intake  [x] Hidden CHO/carbohydrate sources  [x] Alcohol use  [x] Tobacco use  [x] Drug use- Denies   [x] Importance of exercise and reducing sedentary time        Controlled Substance Monitoring:     No flowsheet data found. Allergies   Allergen Reactions    Latex Swelling and Rash    Kiwi Extract Shortness Of Breath    Tramadol Other (See Comments)     Hallucinations.     Zoloft [Sertraline Hcl]      overstim    Fluoxetine Nausea Only    Shellfish-Derived Products Swelling and Rash    Wellbutrin [Bupropion Hcl] Nausea Only         Current Outpatient Medications:     QUEtiapine (SEROQUEL XR) 300 MG extended release tablet, TAKE ONE TABLET BY MOUTH DAILY, Disp: 90 tablet, Rfl: 1    dicyclomine (BENTYL) 20 MG tablet, TAKE ONE TABLET BY MOUTH FOUR TIMES A DAY, Disp: 120 tablet, Rfl: 2    omeprazole (PRILOSEC) 40 MG delayed release capsule, TAKE ONE CAPSULE BY MOUTH DAILY, Disp: 30 capsule, Rfl: 4    levothyroxine (SYNTHROID) 25 MCG tablet, TAKE ONE TABLET BY MOUTH DAILY, Disp: 30 tablet, Rfl: 3    gabapentin (NEURONTIN) 100 MG capsule, TAKE ONE TO THREE CAPSULES BY MOUTH ONCE NIGHTLY, Disp: 90 capsule, Rfl: 3    albuterol sulfate HFA (VENTOLIN HFA) 108 (90 Base) MCG/ACT inhaler, Inhale 2 puffs into the lungs daily as needed for Wheezing or Shortness of Breath, Disp: 1 Inhaler, Rfl: 3    albuterol (PROVENTIL) (2.5 MG/3ML) 0.083% nebulizer solution, Take 3 mLs by nebulization every 6 hours as needed for Wheezing, Disp: 50 vial, Rfl: 1    Cholecalciferol (VITAMIN D3) 250 MCG (85410 UT) CAPS, Take 1 capsule by mouth daily, Disp: 100 capsule, Rfl: 3    melatonin 3 MG TABS tablet, Take 3 mg by mouth daily, Disp: , Rfl:     Patient Active Problem List   Diagnosis    GERD (gastroesophageal reflux disease)    Contraceptive management    Irritable bowel    Insomnia    Back pain, chronic    ADHD (attention deficit hyperactivity disorder)    Allergic rhinitis    Needs flu shot    Dysmenorrhea    Menorrhagia    PMS (premenstrual syndrome)    Anxiety    Tinea versicolor    Atypical depression    Herpes stomatitis    Acquired hypothyroidism    Moderate persistent asthma without complication    Prediabetes    Moderate episode of recurrent major depressive disorder (HCC)    MARV (obstructive sleep apnea)    PLMD (periodic limb movement disorder)    Vitamin D deficiency    Obstructive sleep apnea, adult    Chronic GERD       Past Medical History:   Diagnosis Date    Abnormal Pap smear     Colposcopy; HPV.  Abnormal Pap smear of cervix     Colposcopy positive for HPV.  Abnormal umbilical cord     peripheral cord insertion. monitor growth.  ADHD (attention deficit hyperactivity disorder)     no meds currently.  Anxiety     Currently taking Fluoxitine    Asthma     uses daily inhaler and rescue inhaler PRN.  Bipolar 1 disorder (HCC)     was taking prozac early in pregnancy. switched to Quetiapine, Class C, consider weaning in 3rd trimester.  Chronic GERD     Diabetes mellitus ([facility] [address]     GDM-insulin dependent    Endometriosis     Febrile convulsions (simple), unspecified     Febrile seizure as an infant one time    History of chicken pox 87    HPV in female     Irritable bowel     Obstructive sleep apnea, adult     Positive PPD, treated     negative chest xray.  Postpartum depression     Pre-eclampsia in third trimester 2012    with G1. Baseline PIH labs and 24 hour urine WNL with G2.    Prediabetes 2018    Thyroid disease     Hypothyroid. taking Synthroid.  Vertigo        Past Surgical History:   Procedure Laterality Date    BREAST SURGERY      reduction     SECTION      LTCS for suspected macrosomia and PIH.        Family History   Problem Relation Age of Onset    Diabetes Mother     Alcohol Abuse Mother     Depression Mother     Arthritis Mother     Mental Illness Father         bipolar    Heart Disease Father 43        MI x 3    Hypertension Father     Elevated Lipids Father     Coronary Art Dis Father     Depression Father     COPD Father     Arthritis Father     Cancer Sister         cervical    Depression Sister     Heart Failure Maternal Grandmother     Asthma Maternal Grandmother        Review of Systems   Constitutional: Negative for fatigue. Eyes: Negative for photophobia, pain and visual disturbance. Respiratory: Negative for apnea, cough, choking, chest tightness, shortness of breath and wheezing. Cardiovascular: Negative for chest pain, palpitations and leg swelling. Gastrointestinal: Negative for abdominal distention, abdominal pain, blood in stool, constipation, diarrhea, nausea and vomiting. Endocrine: Negative for cold intolerance and heat intolerance. Musculoskeletal: Negative for arthralgias and myalgias. Skin: Negative for rash. Neurological: Negative for dizziness, tremors, syncope, weakness, numbness and headaches. Psychiatric/Behavioral: Negative for agitation, confusion, decreased concentration, dysphoric mood, hallucinations, sleep disturbance and suicidal ideas. The patient is not nervous/anxious and is not hyperactive. Physical Exam  Constitutional:       Appearance: She is well-developed. HENT:      Head: Normocephalic. Eyes:      Conjunctiva/sclera: Conjunctivae normal.   Abdominal:      General: Abdomen is protuberant. Musculoskeletal:         General: No swelling. Neurological:      Mental Status: She is alert and oriented to person, place, and time. Psychiatric:         Mood and Affect: Mood normal.         Behavior: Behavior normal.         Thought Content:  Thought content normal.         Judgment: Judgment normal.         Orders Only on 05/26/2021   Component Date Value Ref Range Status    Hemoglobin A1C 05/26/2021 5.8  See comment % Final    Comment: Comment:  Diagnosis of Diabetes: > or = 6.5%  Increased risk of diabetes (Prediabetes): 5.7-6.4%  Glycemic Control: Nonpregnant Adults: <7.0%                    Pregnant: <6.0%        eAG 05/26/2021 119.8  mg/dL Final    Cholesterol, Total 05/26/2021 186  0 - 199 mg/dL Final    Triglycerides 05/26/2021 170* 0 - 150 mg/dL Final    HDL 05/26/2021 39* 40 - 60 mg/dL Final    LDL Calculated 05/26/2021 113* <100 mg/dL Final    VLDL Cholesterol Calculated 05/26/2021 34  Not Established mg/dL Final    TSH 05/26/2021 1.16  0.27 - 4.20 uIU/mL Final         Assessment and Plan:    1. Class 1 obesity    Heavily counseled on the importance of therapeutic lifestyle changes through diet and exercise. The patient understands that the goal of treatment is to reach and stay at a healthy weight. The initial treatment goal is to lose at least 5-10% of her body weight in 12 weeks. This will require changes in eating habits, increased physical activity, and behavior changes. Counseled on low carb/gage diet. Patient handouts and education material provided and reviewed in detail with the patient. All questions answered. Encouraged patient to keep a food journal and to bring it to her next visit. Discussed available treatment options in addition to lifestyle changes including medications or OPTIFAST. She is most interested in anti-obesity medications. She may be a good candidate for either Saxenda or Wegovy, but unfortunately none of these are covered by her insurance. OPTIFAST may be a reasonable option. For now, she would liek to stick with lifestyle management only. She understands that medications/meal replacements are most effective as part of a comprehensive treatment plan that includes nutrition, physical activity, and behavior modification.  The patient also understands that she will need close follow-ups every 2-4 weeks if she starts treatment. Follow-up in 2 weeks to discuss lab results and treatment plan. Patient advised that it is her responsibility to follow up on any ordered tests/lab results. Patient understands and agrees with the plan. [employer] access to start personal training. 2. Dietary counseling and surveillance  1200-Delbert/low carb meal plan. 3. Hypothyroidism, unspecified type  Stable on tx. Labs completed on 5/26/21. Nutrition:  [] LCHF/Ketogenic [x] Low carb/low-calorie diet [] Low-calorie diet     []Maintenance        FITTE:   [x] Cardio [] Resistance/stength exercises   [x] ACSM recommendations (150 minutes/week)           Behavior:   [x] Motivational interviewing performed    [] Referral for counseling  [x] Discussed strategies to overcome habits/challenges for focus      [x] Stress management   [x] Stimulus control  [x] Sleep hygiene      No orders of the defined types were placed in this encounter. No follow-ups on file. Allen Power is a 26 y.o. female being evaluated by a Virtual Visit (video visit) encounter to address concerns as mentioned above. A caregiver was present when appropriate. Due to this being a TeleHealth encounter (During XAYRT-89 public health emergency), evaluation of the following organ systems was limited: Vitals/Constitutional/EENT/Resp/CV/GI//MS/Neuro/Skin/Heme-Lymph-Imm. Pursuant to the emergency declaration under the [facility], [address] [place] [address] authority and the [employer] Act, this Virtual Visit was conducted with patient's (and/or legal guardian's) consent, to reduce the patient's risk of exposure to COVID-19 and provide necessary medical care. The patient (and/or legal guardian) has also been advised to contact this office for worsening conditions or problems, and seek emergency medical treatment and/or call 911 if deemed necessary.        Services were provided through a video synchronous discussion virtually to substitute for in-person clinic visit. Patient and provider were located at their individual homes. --Ana Rosa Alanis MD on 6/15/2021 at 9:57 AM    An electronic signature was used to authenticate this note.

## 2021-06-16 ENCOUNTER — OFFICE VISIT (OUTPATIENT)
Dept: SLEEP MEDICINE | Age: 31
End: 2021-06-16
Payer: COMMERCIAL

## 2021-06-16 VITALS
DIASTOLIC BLOOD PRESSURE: 86 MMHG | BODY MASS INDEX: 34.49 KG/M2 | HEIGHT: 66 IN | SYSTOLIC BLOOD PRESSURE: 128 MMHG | RESPIRATION RATE: 16 BRPM | WEIGHT: 214.6 LBS | OXYGEN SATURATION: 95 % | TEMPERATURE: 95.1 F | HEART RATE: 116 BPM

## 2021-06-16 DIAGNOSIS — Z99.89 DEPENDENCE ON OTHER ENABLING MACHINES AND DEVICES: ICD-10-CM

## 2021-06-16 DIAGNOSIS — G47.33 OSA ON CPAP: Primary | ICD-10-CM

## 2021-06-16 DIAGNOSIS — Z99.89 OSA ON CPAP: Primary | ICD-10-CM

## 2021-06-16 PROCEDURE — 1036F TOBACCO NON-USER: CPT | Performed by: PSYCHIATRY & NEUROLOGY

## 2021-06-16 PROCEDURE — G8427 DOCREV CUR MEDS BY ELIG CLIN: HCPCS | Performed by: PSYCHIATRY & NEUROLOGY

## 2021-06-16 PROCEDURE — 99213 OFFICE O/P EST LOW 20 MIN: CPT | Performed by: PSYCHIATRY & NEUROLOGY

## 2021-06-16 PROCEDURE — G8417 CALC BMI ABV UP PARAM F/U: HCPCS | Performed by: PSYCHIATRY & NEUROLOGY

## 2021-06-16 ASSESSMENT — SLEEP AND FATIGUE QUESTIONNAIRES
HOW LIKELY ARE YOU TO NOD OFF OR FALL ASLEEP WHILE SITTING INACTIVE IN A PUBLIC PLACE: 0
HOW LIKELY ARE YOU TO NOD OFF OR FALL ASLEEP WHILE SITTING QUIETLY AFTER LUNCH WITHOUT ALCOHOL: 2
HOW LIKELY ARE YOU TO NOD OFF OR FALL ASLEEP WHILE WATCHING TV: 2
ESS TOTAL SCORE: 11
HOW LIKELY ARE YOU TO NOD OFF OR FALL ASLEEP WHEN YOU ARE A PASSENGER IN A CAR FOR AN HOUR WITHOUT A BREAK: 2
HOW LIKELY ARE YOU TO NOD OFF OR FALL ASLEEP WHILE SITTING AND READING: 2
HOW LIKELY ARE YOU TO NOD OFF OR FALL ASLEEP WHILE SITTING AND TALKING TO SOMEONE: 0
HOW LIKELY ARE YOU TO NOD OFF OR FALL ASLEEP IN A CAR, WHILE STOPPED FOR A FEW MINUTES IN TRAFFIC: 0
HOW LIKELY ARE YOU TO NOD OFF OR FALL ASLEEP WHILE LYING DOWN TO REST IN THE AFTERNOON WHEN CIRCUMSTANCES PERMIT: 3

## 2021-06-16 NOTE — PROGRESS NOTES
Lima Vanegas         : 1990        PHONE: [phone] (home)   [] [address]     [] [address]      [] [facility]     [][facility]    [] [employer]  [x] [facility]     [] Other:    Diagnosis: [x] MARV (G47.33) [] CSA (G47.31) [] Apnea (G47.30)   Length of Need: [] 12 Months [x] 99 Months [] Other:    Machine (JOYCE!): [] Respironics Dream Station      Auto [] ResMed AirSense     Auto [] Other:     []  CPAP () [] Bilevel ()   Mode: [] Auto [] Spontaneous    Mode: [] Auto [] Spontaneous                            Comfort Settings:   - Ramp Pressure: 5 cmH2O                                        - Ramp time: 15 min                                     -  Flex/EPR - 3 full time                                    - For ResMed Bilevel (TiMax-4 sec   TiMin- 0.2 sec)     Humidifier: [] Heated ()        [x] Water chamber replacement ()/ 1 per 6 months        Mask:   [] Nasal () /1 per 3 months [x] Full Face () /1 per 3 months   [] Patient choice -Size and fit mask [x] Patient Choice - Size and fit mask   [] Dispense:  [] Dispense:    [] Headgear () / 1 per 3 months [x] Headgear () / 1 per 3 months   [] Replacement Nasal Cushion ()/2 per month [x] Interface Replacement ()/1 per month   [] Replacement Nasal Pillows ()/2 per month         Tubing: [x] Heated ()/1 per 3 months    [] Standard ()/1 per 3 months [] Other:           Filters: [x] Non-disposable ()/1 per 6 months     [x] Ultra-Fine, Disposable ()/2 per month        Miscellaneous: [] Chin Strap ()/ 1 per 6 months [] O2 bleed-in:       LPM   [] Oximetry on CPAP/Bilevel []  Other:          Start Order Date: 21    MEDICAL JUSTIFICATION:  I, the undersigned, certify that the above prescribed supplies are medically necessary for this patients wellbeing.   In my opinion, the supplies are both reasonable and necessary in reference to accepted standards of medicalpractice in treatment of this patients condition.     Macy Sales MD      NPI: [id number]       Order Signed Date: 06/16/21    Electronically signed by Macy Sales MD on 6/16/2021 at 12:43 PM

## 2021-06-16 NOTE — PATIENT INSTRUCTIONS
Patient Education        Learning About CPAP for Sleep Apnea  What is CPAP? CPAP is a small machine that you use at home every night while you sleep. It increases air pressure in your throat to keep your airway open. When you have sleep apnea, this can help you sleep better so you feel much better. CPAP stands for \"continuous positive airway pressure. \"  The CPAP machine will have one of the following:  · A mask that covers your nose and mouth  · Prongs that fit into your nose  · A mask that covers your nose only, which is the most common type. This type is called NCPAP. The N stands for \"nasal.\"  Why is it done? CPAP is usually the best treatment for obstructive sleep apnea. It is the first treatment choice and the most widely used. CPAP:  · Helps you have more normal sleep, so you feel less sleepy and more alert during the daytime. · May help keep heart failure or other heart problems from getting worse. · May help lower your blood pressure. If you use CPAP, your bed partner may also sleep better. That's because you aren't snoring or restless. Your doctor may suggest CPAP if you have:  · Moderate to severe sleep apnea. · Sleep apnea and coronary artery disease (CAD). · Sleep apnea and heart failure. What are the side effects? Some people who use CPAP have:  · A dry or stuffy nose and a sore throat. · Irritated skin on the face. · Sore eyes. · Bloating. How can you care for yourself? If using CPAP is not comfortable, or if you have certain side effects, work with your doctor to fix them. Here are some things you can try:  · Be sure the mask or nasal prongs fit well. · See if your doctor can adjust the pressure of your CPAP. · If your nose is dry, try a humidifier. · If your nose is runny or stuffy, try decongestant medicine or a steroid nasal spray. Be safe with medicines. Read and follow all instructions on the label. Do not use the medicine longer than the label says.   If these things don't help, you might try a different type of machine. Some machines have air pressure that adjusts on its own. Others have air pressures that are different when you breathe in than when you breathe out. This may reduce discomfort caused by too much pressure in your nose. Where can you learn more? Go to https://chpepiceweb.[employer]. org and sign in to your [employer] account. Enter M992 in the [employer] box to learn more about \"Learning About CPAP for Sleep Apnea. \"     If you do not have an account, please click on the \"Sign Up Now\" link. Current as of: October 26, 2020               Content Version: 12.9  © 2006-2021 [employer]. Care instructions adapted under license by [facility] ([facility]). If you have questions about a medical condition or this instruction, always ask your healthcare professional. [address] any warranty or liability for your use of this information.

## 2021-06-16 NOTE — PROGRESS NOTES
Nyle Boxer, MD ABIM Board Certified in Sleep Medicine  Certified in [address] [place] [address] Certified in Neurology [address]  [address] [facility] [address],  [place]ão [place]   [phone]   [address] [place] [address], [address] [place] [address] Ne                      [address]  382 [facility] [address] [phone]    Subjective:     Patient ID: Ralph Aguillon is a 26 y.o. female. Chief Complaint   Patient presents with    Follow-up     Sleep Apnea f/u for machine restart       HPI:        Ralph Aguillon is a 26 y.o. female was seen today as annual follow for mild obstructive sleep apnea with apnea hypopnea index of 5.1/h with lowest O2 saturation of 88%, patient spent about 0.2 minutes below 90%. Quit using the CPAP last year, has gained 11 pounds since last visit. DOT/CDL - N/A        Previous Report(s)Reviewed: historical medical records         Social History     Socioeconomic History    Marital status:      Spouse name: Davie Marin Number of children: Not on file    Years of education: Not on file    Highest education level: Not on file   Occupational History    Not on file   Tobacco Use    Smoking status: Never Smoker    Smokeless tobacco: Never Used    Tobacco comment: congratulated on nonsmoking status. Vaping Use    Vaping Use: Never used   Substance and Sexual Activity    Alcohol use: No     Alcohol/week: 0.0 standard drinks    Drug use: No    Sexual activity: Yes     Partners: Male     Birth control/protection: I.U.D.    Other Topics Concern    Not on file   Social History Narrative    Exercise: walks 7 time(s) per week    Seatbelt use: Always    Sexual activity: single partner, contraception - IUD           Social Determinants of Health     Financial Resource Strain:     Difficulty of Paying Living Expenses:    Food Insecurity:     Alexandr Richard MD       Allergies as of 06/16/2021 - Fully Reviewed 06/16/2021   Allergen Reaction Noted    Latex Swelling and Rash 06/04/2012    Kiwi extract Shortness Of Breath 08/09/2018    Tramadol Other (See Comments) 06/01/2011    Zoloft [sertraline hcl]  04/09/2014    Fluoxetine Nausea Only 04/09/2014    Shellfish-derived products Swelling and Rash 06/04/2012    Wellbutrin [bupropion hcl] Nausea Only 06/01/2011       Patient Active Problem List   Diagnosis    GERD (gastroesophageal reflux disease)    Contraceptive management    Irritable bowel    Insomnia    Back pain, chronic    ADHD (attention deficit hyperactivity disorder)    Allergic rhinitis    Needs flu shot    Dysmenorrhea    Menorrhagia    PMS (premenstrual syndrome)    Anxiety    Tinea versicolor    Atypical depression    Herpes stomatitis    Acquired hypothyroidism    Moderate persistent asthma without complication    Prediabetes    Moderate episode of recurrent major depressive disorder (HCC)    MARV (obstructive sleep apnea)    PLMD (periodic limb movement disorder)    Vitamin D deficiency    Obstructive sleep apnea, adult    Chronic GERD       Past Medical History:   Diagnosis Date    Abnormal Pap smear     Colposcopy; HPV.  Abnormal Pap smear of cervix     Colposcopy positive for HPV.  Abnormal umbilical cord     peripheral cord insertion. monitor growth.  ADHD (attention deficit hyperactivity disorder)     no meds currently.  Anxiety     Currently taking Fluoxitine    Asthma     uses daily inhaler and rescue inhaler PRN.  Bipolar 1 disorder (HCC)     was taking prozac early in pregnancy. switched to Quetiapine, Class C, consider weaning in 3rd trimester.     Chronic GERD     Diabetes mellitus (HCC)     GDM-insulin dependent    Endometriosis     Febrile convulsions (simple), unspecified     Febrile seizure as an infant one time    History of chicken pox 1/1/89    HPV in female     Irritable Mouth/Throat:      Mouth: Mucous membranes are moist.   Eyes:      Extraocular Movements: Extraocular movements intact. Cardiovascular:      Rate and Rhythm: Normal rate and regular rhythm. Heart sounds: Normal heart sounds. Pulmonary:      Effort: Pulmonary effort is normal.      Breath sounds: Normal breath sounds. Musculoskeletal:         General: Normal range of motion. Cervical back: Normal range of motion and neck supple. Skin:     General: Skin is warm. Neurological:      General: No focal deficit present. Psychiatric:         Mood and Affect: Mood normal.         :   Mild Obstructive Sleep Apnea/Hypopnea Syndrome. Diagnosis Orders   1. MARV on CPAP     2. Dependence on other enabling machines and devices       Plan: Will restart  the CPAP at 7 cmwp. I will order PAP supplies, mask, filters. ... Will consider CPAP titration since she quit the CPAP over a year, if the insurance requires. We discussed the proportionality between weight and AHI. With 10% weight change, the AHI has a 27% proportionate change. With 20% weight change, the AHI has a 45-50% proportionate change. No orders of the defined types were placed in this encounter. Return in about 2 months (around 8/16/2021) for Reveiwing CPAP usage and compliance report and tro.     Larisa Hernandez MD  Medical Director [address] [place] [address]

## 2021-06-29 DIAGNOSIS — G25.81 RESTLESS LEGS SYNDROME (RLS): ICD-10-CM

## 2021-06-30 DIAGNOSIS — E03.9 ACQUIRED HYPOTHYROIDISM: ICD-10-CM

## 2021-06-30 RX ORDER — LEVOTHYROXINE SODIUM 0.03 MG/1
TABLET ORAL
Qty: 30 TABLET | Refills: 5 | Status: SHIPPED | OUTPATIENT
Start: 2021-06-30 | End: 2021-12-01 | Stop reason: SDUPTHER

## 2021-07-01 RX ORDER — GABAPENTIN 100 MG/1
CAPSULE ORAL
Qty: 90 CAPSULE | Refills: 2 | Status: SHIPPED | OUTPATIENT
Start: 2021-07-01 | End: 2021-09-27 | Stop reason: SDUPTHER

## 2021-07-17 ENCOUNTER — TELEMEDICINE (OUTPATIENT)
Dept: BARIATRICS/WEIGHT MGMT | Age: 31
End: 2021-07-17
Payer: COMMERCIAL

## 2021-07-17 DIAGNOSIS — E66.9 CLASS 1 OBESITY: Primary | ICD-10-CM

## 2021-07-17 DIAGNOSIS — Z71.3 DIETARY COUNSELING AND SURVEILLANCE: ICD-10-CM

## 2021-07-17 PROCEDURE — 99213 OFFICE O/P EST LOW 20 MIN: CPT | Performed by: FAMILY MEDICINE

## 2021-07-17 PROCEDURE — G8427 DOCREV CUR MEDS BY ELIG CLIN: HCPCS | Performed by: FAMILY MEDICINE

## 2021-07-17 ASSESSMENT — ENCOUNTER SYMPTOMS
DIARRHEA: 0
VOMITING: 0
COUGH: 0
BLOOD IN STOOL: 0
ABDOMINAL PAIN: 0
EYE PAIN: 0
PHOTOPHOBIA: 0
CHOKING: 0
APNEA: 0
WHEEZING: 0
CONSTIPATION: 0
NAUSEA: 0
CHEST TIGHTNESS: 0
ABDOMINAL DISTENTION: 0
SHORTNESS OF BREATH: 0

## 2021-07-17 NOTE — PROGRESS NOTES
Patient: Aubrey Costa                      Encounter Date: 7/17/2021    YOB: 1990               Age: 33 y.o. Chief Complaint   Patient presents with    Weight Management     F/u MWM       Patient identification was verified at the start of the visit. Patient-Reported Vitals 12/7/2020   Patient-Reported Weight 210   Patient-Reported Height 5'6\"         BP Readings from Last 1 Encounters:   06/16/21 128/86       BMI Readings from Last 1 Encounters:   06/16/21 34.64 kg/m²       Pulse Readings from Last 1 Encounters:   06/16/21 116           Wt Readings from Last 3 Encounters:   06/16/21 214 lb 9.6 oz (97.3 kg)   06/14/21 215 lb (97.5 kg)   05/26/21 215 lb (97.5 kg)       Self-reported weight: 209 pounds     HPI: 33 y.o. female with a long-standing history of obesity presents today for a virtual video follow-up. She has lost 6 pounds since her last visit. Current treatment includes low carb/gage diet. Food recall and assessment reviewed. Making better dietary choices. Happy with weight loss. Motivated to continue losing weight.     -Preparing meals ahead of time  -Mostly eating tuna, chicken, and ham salad  -Avoiding fast foods (but will still  [employer] for her kids)  -Plans on starting exercise program at  sometime next week     Diet: []LCHF/Ketogenic [x]Modified low-calorie/low carb diet  []Low-calorie diet          []Maintenance       []Other:         Adherent? [x]Yes     []No         Exercise: []Cardio     []Resistance/strength training     [x]Other: No intentional exercise, but trying to be physically active     Allergies   Allergen Reactions    Latex Swelling and Rash    Kiwi Extract Shortness Of Breath    Tramadol Other (See Comments)     Hallucinations.     Zoloft [Sertraline Hcl]      overstim    Fluoxetine Nausea Only    Shellfish-Derived Products Swelling and Rash    Wellbutrin [Bupropion Hcl] Nausea Only         Current Outpatient Medications:     gabapentin (NEURONTIN) 100 MG capsule, TAKE 1-3 CAPSULES BY MOUTH ONCE NIGHTLY, Disp: 90 capsule, Rfl: 2    levothyroxine (SYNTHROID) 25 MCG tablet, TAKE ONE TABLET BY MOUTH DAILY, Disp: 30 tablet, Rfl: 5    QUEtiapine (SEROQUEL XR) 300 MG extended release tablet, TAKE ONE TABLET BY MOUTH DAILY, Disp: 90 tablet, Rfl: 1    dicyclomine (BENTYL) 20 MG tablet, TAKE ONE TABLET BY MOUTH FOUR TIMES A DAY, Disp: 120 tablet, Rfl: 2    omeprazole (PRILOSEC) 40 MG delayed release capsule, TAKE ONE CAPSULE BY MOUTH DAILY, Disp: 30 capsule, Rfl: 4    albuterol sulfate HFA (VENTOLIN HFA) 108 (90 Base) MCG/ACT inhaler, Inhale 2 puffs into the lungs daily as needed for Wheezing or Shortness of Breath, Disp: 1 Inhaler, Rfl: 3    albuterol (PROVENTIL) (2.5 MG/3ML) 0.083% nebulizer solution, Take 3 mLs by nebulization every 6 hours as needed for Wheezing, Disp: 50 vial, Rfl: 1    Cholecalciferol (VITAMIN D3) 250 MCG (81920 UT) CAPS, Take 1 capsule by mouth daily, Disp: 100 capsule, Rfl: 3    melatonin 3 MG TABS tablet, Take 3 mg by mouth daily, Disp: , Rfl:     Patient Active Problem List   Diagnosis    GERD (gastroesophageal reflux disease)    Contraceptive management    Irritable bowel    Insomnia    Back pain, chronic    ADHD (attention deficit hyperactivity disorder)    Allergic rhinitis    Needs flu shot    Dysmenorrhea    Menorrhagia    PMS (premenstrual syndrome)    Anxiety    Tinea versicolor    Atypical depression    Herpes stomatitis    Acquired hypothyroidism    Moderate persistent asthma without complication    Prediabetes    Moderate episode of recurrent major depressive disorder (HCC)    MARV (obstructive sleep apnea)    PLMD (periodic limb movement disorder)    Vitamin D deficiency    Obstructive sleep apnea, adult    Chronic GERD       Review of Systems   Constitutional: Negative for fatigue. Eyes: Negative for photophobia, pain and visual disturbance.    Respiratory: Negative for apnea, cough, choking, chest tightness, shortness of breath and wheezing. Cardiovascular: Negative for chest pain, palpitations and leg swelling. Gastrointestinal: Negative for abdominal distention, abdominal pain, blood in stool, constipation, diarrhea, nausea and vomiting. Endocrine: Negative for cold intolerance and heat intolerance. Musculoskeletal: Negative for arthralgias and myalgias. Skin: Negative for rash. Neurological: Negative for dizziness, tremors, syncope, weakness, numbness and headaches. Psychiatric/Behavioral: Negative for agitation, confusion, decreased concentration, dysphoric mood, hallucinations, sleep disturbance and suicidal ideas. The patient is not nervous/anxious and is not hyperactive. Physical Exam  Constitutional:       Appearance: She is well-developed. HENT:      Head: Normocephalic. Eyes:      Conjunctiva/sclera: Conjunctivae normal.   Abdominal:      General: Abdomen is protuberant. Musculoskeletal:         General: No swelling. Neurological:      Mental Status: She is alert and oriented to person, place, and time. Psychiatric:         Mood and Affect: Mood normal.         Behavior: Behavior normal.         Thought Content:  Thought content normal.         Judgment: Judgment normal.         Orders Only on 05/26/2021   Component Date Value Ref Range Status    Hemoglobin A1C 05/26/2021 5.8  See comment % Final    Comment: Comment:  Diagnosis of Diabetes: > or = 6.5%  Increased risk of diabetes (Prediabetes): 5.7-6.4%  Glycemic Control: Nonpregnant Adults: <7.0%                    Pregnant: <6.0%        eAG 05/26/2021 119.8  mg/dL Final    Cholesterol, Total 05/26/2021 186  0 - 199 mg/dL Final    Triglycerides 05/26/2021 170* 0 - 150 mg/dL Final    HDL 05/26/2021 39* 40 - 60 mg/dL Final    LDL Calculated 05/26/2021 113* <100 mg/dL Final    VLDL Cholesterol Calculated 05/26/2021 34  Not Established mg/dL Final    TSH 05/26/2021 1.16  0.27 - 4.20 uIU/mL Final Assessment and Plan:  1. Class 1 obesity  Improving. Continue current management. Reinforced low carb diet. Increase exercise as able. F/u in 6 weeks. 2. Dietary counseling and surveillance  1200-Delbert/LC meal plan. Nutrition plan: [] LCHF/Ketogenic   [x] Modified low-calorie diet (low carb/low-delbert)               [] Low-calorie diet    []Maintenance       []Other    Exercise: [x]Cardio     []Resistance/strength training                       [x]ACSM recommendations (150 minutes/week in active weight loss)                              Behavior: [x]Motivational interviewing performed    [] Referral for counseling                         [x] Discussed strategies to overcome habits/challenges for focus         [] Stress management   [x] Stimulus control                    [] Sleep hygiene    Reviewed:  [x] Nutrition and the importance of regularprotein intake  [x] Hidden carbohydrate sources  [x] Alcohol use  [x] Tobacco use   [x] Importance of exercise and reducing sedentary time          No orders of the defined types were placed in this encounter. No follow-ups on file. Alek Gottlieb is a 33 y.o. female being evaluated by a Virtual Visit (video visit) encounter to address concerns as mentioned above. A caregiver was present when appropriate. Due to this being a TeleHealth encounter (During J[place]- public health emergency), evaluation of the following organ systems was limited: Vitals/Constitutional/EENT/Resp/CV/GI//MS/Neuro/Skin/Heme-Lymph-Imm. Pursuant to the emergency declaration under the [address] [place] [address] and the [employer] Act, this Virtual Visit was conducted with patient's (and/or legal guardian's) consent, to reduce the patient's risk of exposure to COVID-19 and provide necessary medical care.   The patient (and/or legal guardian) has also been advised to contact this office for worsening conditions or problems, and seek emergency medical treatment and/or call 911 if deemed necessary. Services were provided through a video synchronous discussion virtually to substitute for in-person clinic visit. Patient and provider were located at their individual homes. --Alonso Sanders MD on 7/17/2021 at 2:39 PM    An electronic signature was used to authenticate this note.

## 2021-08-13 ENCOUNTER — TELEPHONE (OUTPATIENT)
Dept: FAMILY MEDICINE CLINIC | Age: 31
End: 2021-08-13

## 2021-08-13 NOTE — TELEPHONE ENCOUNTER
Called pt     Advised Dr. Femi Pena is booked but we could get her in with [facility] sooner.     Scheduled a vv with thomas on Monday 8.16.2021    Pt stated she would like to have a general follow up with dr. Margo Yates scheduled for next available     Scheduled with Dr. Femi Pena [phone] at 10:00am

## 2021-08-13 NOTE — TELEPHONE ENCOUNTER
----- Message from Alan Howell sent at 8/13/2021 12:42 PM EDT -----  Subject: Appointment Request    Reason for Call: Urgent (Patient Request) Existing Condition Follow    QUESTIONS  Type of Appointment? Established Patient  Reason for appointment request? Available appointments did not meet   patient need  Additional Information for Provider? Patient needs to speak with you about   her depression and some concerns that her family members have brought to   her attention. First available appointment was in October she needs   something asap. Patient would like a VV.   ---------------------------------------------------------------------------  --------------  CALL BACK INFO  What is the best way for the office to contact you? OK to leave message on   voicemail  Preferred Call Back Phone Number? [phone]  ---------------------------------------------------------------------------  --------------  SCRIPT ANSWERS  Relationship to Patient? Self  (Is the patient requesting to be seen urgently for their symptoms?)? Yes  Is this follow up request related to routine Diabetes Management? No  Are you having any new concerns about your existing condition? No  Have you been diagnosed with, awaiting test results for, or told that you   are suspected of having COVID-19 (Coronavirus)? (If patient has tested   negative or was tested as a requirement for work, school, or travel and   not based on symptoms, answer no)? No  Do you currently have flu-like symptoms including fever or chills, cough,   shortness of breath, difficulty breathing, or new loss of taste or smell? No  Have you had close contact with someone with COVID-19 in the last 14 days? No  (Service Expert  click yes below to proceed with [employer] As Usual   Scheduling)?  Yes

## 2021-08-16 ENCOUNTER — TELEMEDICINE (OUTPATIENT)
Dept: FAMILY MEDICINE CLINIC | Age: 31
End: 2021-08-16
Payer: COMMERCIAL

## 2021-08-16 DIAGNOSIS — R45.851 DEPRESSION WITH SUICIDAL IDEATION: Primary | ICD-10-CM

## 2021-08-16 DIAGNOSIS — F32.A DEPRESSION WITH SUICIDAL IDEATION: Primary | ICD-10-CM

## 2021-08-16 PROCEDURE — 99214 OFFICE O/P EST MOD 30 MIN: CPT | Performed by: NURSE PRACTITIONER

## 2021-08-16 PROCEDURE — G8427 DOCREV CUR MEDS BY ELIG CLIN: HCPCS | Performed by: NURSE PRACTITIONER

## 2021-08-16 PROCEDURE — G8417 CALC BMI ABV UP PARAM F/U: HCPCS | Performed by: NURSE PRACTITIONER

## 2021-08-16 PROCEDURE — 1036F TOBACCO NON-USER: CPT | Performed by: NURSE PRACTITIONER

## 2021-08-16 ASSESSMENT — ENCOUNTER SYMPTOMS
COUGH: 0
SHORTNESS OF BREATH: 0

## 2021-08-16 NOTE — PROGRESS NOTES
2021    TELEHEALTH EVALUATION -- Audio/Visual (During HRXHF-46 public health emergency)    HPI:    Vannesa Ramirez (:  1990) has requested an audio/video evaluation for the following concern(s):  Chief Complaint   Patient presents with    Depression     Patient having increased depression. Patient reports that multiple family members has reported that she is not acting like herself. She reports for the past year she has been feeling really depressed. Father passed away a year ago. Worsening depression started after this. Reports that she does not want to get out of bed. Crying a lot. She reports that she has anxiety daily. She is sleeping well at night. Reports that if she does not take her medication she will stay up all night and will be crying. Has been taking the seroquel 300 mg qhs, gabapentin 300 mg qhs, melatonin 3 mg qhs. She is having suicidal thoughts. Reports that she does not have access to a gun. Friend killed himself with carbon dioxide poisoning. This would be her plan to go to sleep and not wake up. Children are 3 and 8. Reports children are doing good. Older daughter starts school tomorrow. She is .  Prentiss Dubin [phone]    She does not have a psychiatrist.   Has not been to counseling, but would like to. Review of Systems   Constitutional: Positive for activity change and fatigue. Respiratory: Negative for cough and shortness of breath. Cardiovascular: Negative for chest pain. Psychiatric/Behavioral: Positive for dysphoric mood and suicidal ideas. Negative for hallucinations and sleep disturbance. The patient is nervous/anxious. Prior to Visit Medications    Medication Sig Taking?  Authorizing Provider   gabapentin (NEURONTIN) 100 MG capsule TAKE 1-3 CAPSULES BY MOUTH ONCE NIGHTLY Yes Lisette Carranza MD   levothyroxine (SYNTHROID) 25 MCG tablet TAKE ONE TABLET BY MOUTH DAILY Yes Lisette Carranza MD   QUEtiapine (SEROQUEL XR) 300 MG extended release tablet TAKE ONE TABLET BY MOUTH DAILY Yes Jose Flatness, MD   dicyclomine (BENTYL) 20 MG tablet TAKE ONE TABLET BY MOUTH FOUR TIMES A DAY Yes Jose Flatness, MD   omeprazole (PRILOSEC) 40 MG delayed release capsule TAKE ONE CAPSULE BY MOUTH DAILY Yes Jose Flatness, MD   albuterol sulfate HFA (VENTOLIN HFA) 108 (90 Base) MCG/ACT inhaler Inhale 2 puffs into the lungs daily as needed for Wheezing or Shortness of Breath Yes Craige Rayshawn Day, MD   melatonin 3 MG TABS tablet Take 3 mg by mouth daily Yes Historical Provider, MD   albuterol (PROVENTIL) (2.5 MG/3ML) 0.083% nebulizer solution Take 3 mLs by nebulization every 6 hours as needed for Wheezing  Jose Flatness, MD   Cholecalciferol (VITAMIN D3) 250 MCG (20087 UT) CAPS Take 1 capsule by mouth daily  Jose Flatness, MD       Social History     Tobacco Use    Smoking status: Never Smoker    Smokeless tobacco: Never Used    Tobacco comment: congratulated on nonsmoking status. Vaping Use    Vaping Use: Never used   Substance Use Topics    Alcohol use: No     Alcohol/week: 0.0 standard drinks    Drug use: No        PHYSICAL EXAMINATION:  [ INSTRUCTIONS:  \"[x]\" Indicates a positive item  \"[]\" Indicates a negative item  -- DELETE ALL ITEMS NOT EXAMINED]  Patient-Reported Vitals 8/16/2021   Patient-Reported Weight 207 lb   Patient-Reported Height 5 ft 6 in          Constitutional: [x] Appears well-developed and well-nourished [x] No apparent distress      [] Abnormal-   Mental status  [x] Alert and awake  [x] Oriented to person/place/time [x]Able to follow commands      Eyes:  EOM    [x]  Normal  [] Abnormal-  Sclera  [x]  Normal  [] Abnormal -         Discharge [x]  None visible  [] Abnormal -    HENT:   [x] Normocephalic, atraumatic.   [] Abnormal   [] Mouth/Throat: Mucous membranes are moist.     External Ears [x] Normal  [] Abnormal-     Neck: [x] No visualized mass     Pulmonary/Chest: [x] Respiratory effort normal.  [x] No visualized signs of difficulty breathing or respiratory distress        [] Abnormal-      Musculoskeletal:   [] Normal gait with no signs of ataxia         [x] Normal range of motion of neck        [] Abnormal-       Neurological:        [x] No Facial Asymmetry (Cranial nerve 7 motor function) (limited exam to video visit)          [x] No gaze palsy        [] Abnormal-         Skin:        [x] No significant exanthematous lesions or discoloration noted on facial skin         [] Abnormal-            Psychiatric:       [] Normal Affect [x] No Hallucinations        [x] Abnormal-  Tearful     Other pertinent observable physical exam findings-     ASSESSMENT/PLAN:  1. Depression with suicidal ideation  Elias Horowitz has worsening depression and anxiety with suicidal ideation. Advised to go to ER for evaluation and admission. She reports that she wants to take her daughter to her first day of school tomorrow. She will go up to the hospital after she drops her daughter off at school. She contracts to safety today. Given permission for me to call her  Susie Jalloh. Spoke to Susie Jalloh who was at work. Discussed [employer]. He was aware of her mood and suicidal ideations. He will remove gun from home. Jacek Borges previously told me she did not have access to gun). He will leave work and stay with Elias Horowitz to ensure her safety. He will take her to ER tomorrow. Will take her tonight if symptoms worsen. He will contact his mother to help care for their two children. After inpatient psychiatric evaluation will need outpatient psychiatric care and counseling. Return in about 1 week (around 8/23/2021), or if symptoms worsen or fail to improve, for hospital f/u . Jose Rizwana, was evaluated through a synchronous (real-time) audio-video encounter. The patient (or guardian if applicable) is aware that this is a billable service. Verbal consent to proceed has been obtained within the past 12 months.  The visit was conducted pursuant to the emergency declaration under the [facility], [address] [place] [address] [facility] authority and the [employer] General Act. Patient identification was verified, and a caregiver was present when appropriate. The patient was located in a state where the provider was credentialed to provide care. Total time spent on this encounter: 38 minutes     --Ottis Litten, APRN - CNP on 8/16/2021 at 2:32 PM    An electronic signature was used to authenticate this note.

## 2021-08-17 ENCOUNTER — HOSPITAL ENCOUNTER (EMERGENCY)
Age: 31
Discharge: ANOTHER HOSPITAL | End: 2021-08-17
Attending: EMERGENCY MEDICINE
Payer: COMMERCIAL

## 2021-08-17 ENCOUNTER — HOSPITAL ENCOUNTER (INPATIENT)
Age: 31
LOS: 6 days | Discharge: HOME | DRG: 753 | End: 2021-08-23
Attending: PSYCHIATRY & NEUROLOGY | Admitting: PSYCHIATRY & NEUROLOGY
Payer: COMMERCIAL

## 2021-08-17 VITALS
RESPIRATION RATE: 16 BRPM | TEMPERATURE: 48.2 F | HEART RATE: 81 BPM | BODY MASS INDEX: 34.76 KG/M2 | WEIGHT: 215.39 LBS | OXYGEN SATURATION: 97 % | SYSTOLIC BLOOD PRESSURE: 133 MMHG | DIASTOLIC BLOOD PRESSURE: 90 MMHG

## 2021-08-17 DIAGNOSIS — R45.851 HAS ACCESS TO PLANNED MEANS OF SUICIDE: ICD-10-CM

## 2021-08-17 DIAGNOSIS — R45.851 SUICIDAL THOUGHTS: Primary | ICD-10-CM

## 2021-08-17 PROBLEM — F39 MOOD DISORDER (HCC): Status: ACTIVE | Noted: 2021-08-17

## 2021-08-17 LAB
A/G RATIO: 1.4 (ref 1.1–2.2)
ACETAMINOPHEN LEVEL: <5 UG/ML (ref 10–30)
ALBUMIN SERPL-MCNC: 4.5 G/DL (ref 3.4–5)
ALP BLD-CCNC: 99 U/L (ref 40–129)
ALT SERPL-CCNC: 24 U/L (ref 10–40)
AMPHETAMINE SCREEN, URINE: ABNORMAL
ANION GAP SERPL CALCULATED.3IONS-SCNC: 13 MMOL/L (ref 3–16)
AST SERPL-CCNC: 18 U/L (ref 15–37)
BARBITURATE SCREEN URINE: ABNORMAL
BASOPHILS ABSOLUTE: 0 K/UL (ref 0–0.2)
BASOPHILS RELATIVE PERCENT: 0.6 %
BENZODIAZEPINE SCREEN, URINE: ABNORMAL
BILIRUB SERPL-MCNC: <0.2 MG/DL (ref 0–1)
BILIRUBIN URINE: NEGATIVE
BLOOD, URINE: NEGATIVE
BUN BLDV-MCNC: 10 MG/DL (ref 7–20)
CALCIUM SERPL-MCNC: 9.5 MG/DL (ref 8.3–10.6)
CANNABINOID SCREEN URINE: POSITIVE
CHLORIDE BLD-SCNC: 101 MMOL/L (ref 99–110)
CLARITY: CLEAR
CO2: 25 MMOL/L (ref 21–32)
COCAINE METABOLITE SCREEN URINE: ABNORMAL
COLOR: YELLOW
CREAT SERPL-MCNC: 0.7 MG/DL (ref 0.6–1.1)
EOSINOPHILS ABSOLUTE: 0.1 K/UL (ref 0–0.6)
EOSINOPHILS RELATIVE PERCENT: 1.3 %
ETHANOL: NORMAL MG/DL (ref 0–0.08)
GFR AFRICAN AMERICAN: >60
GFR NON-AFRICAN AMERICAN: >60
GLOBULIN: 3.2 G/DL
GLUCOSE BLD-MCNC: 122 MG/DL (ref 70–99)
GLUCOSE URINE: NEGATIVE MG/DL
HCG(URINE) PREGNANCY TEST: NEGATIVE
HCT VFR BLD CALC: 44 % (ref 36–48)
HEMOGLOBIN: 14.9 G/DL (ref 12–16)
KETONES, URINE: NEGATIVE MG/DL
LEUKOCYTE ESTERASE, URINE: NEGATIVE
LYMPHOCYTES ABSOLUTE: 2.1 K/UL (ref 1–5.1)
LYMPHOCYTES RELATIVE PERCENT: 30.3 %
Lab: ABNORMAL
MCH RBC QN AUTO: 29.1 PG (ref 26–34)
MCHC RBC AUTO-ENTMCNC: 33.9 G/DL (ref 31–36)
MCV RBC AUTO: 85.6 FL (ref 80–100)
METHADONE SCREEN, URINE: ABNORMAL
MICROSCOPIC EXAMINATION: NORMAL
MONOCYTES ABSOLUTE: 0.5 K/UL (ref 0–1.3)
MONOCYTES RELATIVE PERCENT: 7.1 %
NEUTROPHILS ABSOLUTE: 4.2 K/UL (ref 1.7–7.7)
NEUTROPHILS RELATIVE PERCENT: 60.7 %
NITRITE, URINE: NEGATIVE
OPIATE SCREEN URINE: ABNORMAL
OXYCODONE URINE: ABNORMAL
PDW BLD-RTO: 13.3 % (ref 12.4–15.4)
PH UA: 6
PH UA: 6 (ref 5–8)
PHENCYCLIDINE SCREEN URINE: ABNORMAL
PLATELET # BLD: 233 K/UL (ref 135–450)
PMV BLD AUTO: 9 FL (ref 5–10.5)
POTASSIUM SERPL-SCNC: 4 MMOL/L (ref 3.5–5.1)
PROPOXYPHENE SCREEN: ABNORMAL
PROTEIN UA: NEGATIVE MG/DL
RBC # BLD: 5.14 M/UL (ref 4–5.2)
SALICYLATE, SERUM: <0.3 MG/DL (ref 15–30)
SARS-COV-2, NAAT: NOT DETECTED
SODIUM BLD-SCNC: 139 MMOL/L (ref 136–145)
SPECIFIC GRAVITY UA: 1.01 (ref 1–1.03)
TOTAL PROTEIN: 7.7 G/DL (ref 6.4–8.2)
URINE REFLEX TO CULTURE: NORMAL
URINE TYPE: NORMAL
UROBILINOGEN, URINE: 0.2 E.U./DL
WBC # BLD: 6.9 K/UL (ref 4–11)

## 2021-08-17 PROCEDURE — 85025 COMPLETE CBC W/AUTO DIFF WBC: CPT

## 2021-08-17 PROCEDURE — 84703 CHORIONIC GONADOTROPIN ASSAY: CPT

## 2021-08-17 PROCEDURE — 80179 DRUG ASSAY SALICYLATE: CPT

## 2021-08-17 PROCEDURE — 87635 SARS-COV-2 COVID-19 AMP PRB: CPT

## 2021-08-17 PROCEDURE — 82077 ASSAY SPEC XCP UR&BREATH IA: CPT

## 2021-08-17 PROCEDURE — 80053 COMPREHEN METABOLIC PANEL: CPT

## 2021-08-17 PROCEDURE — 6370000000 HC RX 637 (ALT 250 FOR IP): Performed by: PSYCHIATRY & NEUROLOGY

## 2021-08-17 PROCEDURE — 81003 URINALYSIS AUTO W/O SCOPE: CPT

## 2021-08-17 PROCEDURE — 1240000000 HC EMOTIONAL WELLNESS R&B

## 2021-08-17 PROCEDURE — 93005 ELECTROCARDIOGRAM TRACING: CPT | Performed by: EMERGENCY MEDICINE

## 2021-08-17 PROCEDURE — 80143 DRUG ASSAY ACETAMINOPHEN: CPT

## 2021-08-17 PROCEDURE — 99284 EMERGENCY DEPT VISIT MOD MDM: CPT

## 2021-08-17 PROCEDURE — 80307 DRUG TEST PRSMV CHEM ANLYZR: CPT

## 2021-08-17 PROCEDURE — 6370000000 HC RX 637 (ALT 250 FOR IP): Performed by: EMERGENCY MEDICINE

## 2021-08-17 RX ORDER — ACETAMINOPHEN 500 MG
TABLET ORAL
Status: DISCONTINUED
Start: 2021-08-17 | End: 2021-08-17 | Stop reason: WASHOUT

## 2021-08-17 RX ORDER — ACETAMINOPHEN 325 MG/1
650 TABLET ORAL EVERY 4 HOURS PRN
Status: DISCONTINUED | OUTPATIENT
Start: 2021-08-17 | End: 2021-08-23 | Stop reason: HOSPADM

## 2021-08-17 RX ORDER — ACETAMINOPHEN 500 MG
1000 TABLET ORAL ONCE
Status: COMPLETED | OUTPATIENT
Start: 2021-08-17 | End: 2021-08-17

## 2021-08-17 RX ORDER — POLYETHYLENE GLYCOL 3350 17 G
2 POWDER IN PACKET (EA) ORAL
Status: DISCONTINUED | OUTPATIENT
Start: 2021-08-17 | End: 2021-08-23 | Stop reason: HOSPADM

## 2021-08-17 RX ORDER — TRAZODONE HYDROCHLORIDE 50 MG/1
50 TABLET ORAL NIGHTLY PRN
Status: DISCONTINUED | OUTPATIENT
Start: 2021-08-17 | End: 2021-08-23 | Stop reason: HOSPADM

## 2021-08-17 RX ORDER — HYDROXYZINE PAMOATE 50 MG/1
50 CAPSULE ORAL 3 TIMES DAILY PRN
Status: DISCONTINUED | OUTPATIENT
Start: 2021-08-17 | End: 2021-08-23 | Stop reason: HOSPADM

## 2021-08-17 RX ADMIN — HYDROXYZINE PAMOATE 50 MG: 50 CAPSULE ORAL at 22:02

## 2021-08-17 RX ADMIN — ACETAMINOPHEN 1000 MG: 500 TABLET ORAL at 11:12

## 2021-08-17 RX ADMIN — TRAZODONE HYDROCHLORIDE 50 MG: 50 TABLET ORAL at 22:02

## 2021-08-17 ASSESSMENT — ENCOUNTER SYMPTOMS
ABDOMINAL PAIN: 0
SHORTNESS OF BREATH: 0
APNEA: 0
EYE REDNESS: 0
BACK PAIN: 0
SORE THROAT: 0
CHOKING: 0
NAUSEA: 0
VOMITING: 0
FACIAL SWELLING: 0
EYE DISCHARGE: 0

## 2021-08-17 ASSESSMENT — SLEEP AND FATIGUE QUESTIONNAIRES
DIFFICULTY ARISING: NO
RESTFUL SLEEP: NO
DO YOU USE A SLEEP AID: YES
SLEEP PATTERN: DIFFICULTY FALLING ASLEEP;DISTURBED/INTERRUPTED SLEEP
AVERAGE NUMBER OF SLEEP HOURS: 9
DO YOU HAVE DIFFICULTY SLEEPING: YES
DIFFICULTY STAYING ASLEEP: YES
DIFFICULTY FALLING ASLEEP: YES

## 2021-08-17 ASSESSMENT — PAIN DESCRIPTION - ORIENTATION: ORIENTATION: UPPER

## 2021-08-17 ASSESSMENT — PAIN DESCRIPTION - FREQUENCY: FREQUENCY: CONTINUOUS

## 2021-08-17 ASSESSMENT — PAIN SCALES - GENERAL
PAINLEVEL_OUTOF10: 0
PAINLEVEL_OUTOF10: 6

## 2021-08-17 ASSESSMENT — LIFESTYLE VARIABLES: HISTORY_ALCOHOL_USE: NO

## 2021-08-17 ASSESSMENT — PAIN DESCRIPTION - LOCATION: LOCATION: HEAD

## 2021-08-17 ASSESSMENT — PAIN DESCRIPTION - PROGRESSION: CLINICAL_PROGRESSION: GRADUALLY WORSENING

## 2021-08-17 ASSESSMENT — PAIN DESCRIPTION - PAIN TYPE: TYPE: ACUTE PAIN

## 2021-08-17 ASSESSMENT — PAIN DESCRIPTION - DESCRIPTORS: DESCRIPTORS: ACHING

## 2021-08-17 ASSESSMENT — PAIN DESCRIPTION - ONSET: ONSET: PROGRESSIVE

## 2021-08-17 ASSESSMENT — PAIN - FUNCTIONAL ASSESSMENT: PAIN_FUNCTIONAL_ASSESSMENT: ACTIVITIES ARE NOT PREVENTED

## 2021-08-17 NOTE — ED PROVIDER NOTES
[facility] [facility]      Pt Name: Yuliana Nicolas  ILYA:[id number]  [place] 1990  Date of evaluation: 8/17/2021  Provider: Kelle Dudley PA-C     This patient was seen and evaluated by attending physician Dr. Regla Marino MD      Chief Complaint:    Chief Complaint   Patient presents with    Psychiatric Evaluation     pt states she went to her PC and they talked about her depression and she stated she didnt have any SI thoughts at this time but if I did I would put a generator in my car and sit in there. pt denies wanting to act on them at this time. Nursing Notes, Past Medical Hx, Past Surgical Hx, Social Hx, Allergies, and Family Hx were all reviewed and agreed with or any disagreements were addressed in the HPI.    HPI: (Location, Duration, Timing, Severity, Quality, Assoc Sx, Context, Modifying factors)  This is a  33 y.o. female who presents to the emergency room chief complaint of suicidal thoughts. Patient was sent in by her primary care physician office. History of depression and suicidal thoughts. She informed them that she had a plan that she will put a generator in the car and leave it on. States he has had thoughts for a while but she tells me she never came up with a plan. But she told the nurse as well as her primary doctor the plan was putting a generator in her vehicle and leaving it on. Denies headache, denies hallucination, denies hearing voices. No chest pain, no abdominal pain, no nausea vomiting. Denies pregnancy. No weakness. No other complaints. PastMedical/Surgical History:      Diagnosis Date    Abnormal Pap smear     Colposcopy; HPV.  Abnormal Pap smear of cervix     Colposcopy positive for HPV.  Abnormal umbilical cord     peripheral cord insertion. monitor growth.  ADHD (attention deficit hyperactivity disorder)     no meds currently.     Anxiety     Currently taking Fluoxitine    Asthma     uses daily inhaler and rescue inhaler PRN.  Bipolar 1 disorder (HCC)     was taking prozac early in pregnancy. switched to Quetiapine, Class C, consider weaning in 3rd trimester.  Chronic GERD     Diabetes mellitus ([facility] [address]     GDM-insulin dependent    Endometriosis     Febrile convulsions (simple), unspecified     Febrile seizure as an infant one time    History of chicken pox 87    HPV in female     Irritable bowel     Obstructive sleep apnea, adult     Positive PPD, treated     negative chest xray.  Postpartum depression     Pre-eclampsia in third trimester 2012    with G1. Baseline PIH labs and 24 hour urine WNL with G2.    Prediabetes 2018    Thyroid disease     Hypothyroid. taking Synthroid.  Vertigo          Procedure Laterality Date    BREAST SURGERY      reduction     SECTION      LTCS for suspected macrosomia and PIH. Medications:  Previous Medications    ALBUTEROL (PROVENTIL) (2.5 MG/3ML) 0.083% NEBULIZER SOLUTION    Take 3 mLs by nebulization every 6 hours as needed for Wheezing    ALBUTEROL SULFATE HFA (VENTOLIN HFA) 108 (90 BASE) MCG/ACT INHALER    Inhale 2 puffs into the lungs daily as needed for Wheezing or Shortness of Breath    CHOLECALCIFEROL (VITAMIN D3) 250 MCG (93328 UT) CAPS    Take 1 capsule by mouth daily    DICYCLOMINE (BENTYL) 20 MG TABLET    TAKE ONE TABLET BY MOUTH FOUR TIMES A DAY    GABAPENTIN (NEURONTIN) 100 MG CAPSULE    TAKE 1-3 CAPSULES BY MOUTH ONCE NIGHTLY    LEVOTHYROXINE (SYNTHROID) 25 MCG TABLET    TAKE ONE TABLET BY MOUTH DAILY    MELATONIN 3 MG TABS TABLET    Take 3 mg by mouth daily    OMEPRAZOLE (PRILOSEC) 40 MG DELAYED RELEASE CAPSULE    TAKE ONE CAPSULE BY MOUTH DAILY    QUETIAPINE (SEROQUEL XR) 300 MG EXTENDED RELEASE TABLET    TAKE ONE TABLET BY MOUTH DAILY         Review of Systems:  (2-9 systems needed)  Review of Systems   Constitutional: Negative for chills and fever.    HENT: Negative for congestion, facial swelling and sore throat. Eyes: Negative for discharge and redness. Respiratory: Negative for apnea, choking and shortness of breath. Cardiovascular: Negative for chest pain. Gastrointestinal: Negative for abdominal pain, nausea and vomiting. Genitourinary: Negative for dysuria. Musculoskeletal: Negative for back pain, neck pain and neck stiffness. Neurological: Negative for dizziness, tremors, seizures, weakness and headaches. Psychiatric/Behavioral: Positive for suicidal ideas. All other systems reviewed and are negative. \"Positives and Pertinent negatives as per HPI\"    Physical Exam:  Physical Exam  Vitals and nursing note reviewed. Constitutional:       Appearance: She is well-developed. She is not diaphoretic. HENT:      Head: Normocephalic and atraumatic. Nose: Nose normal.      Mouth/Throat:      Mouth: Mucous membranes are moist.      Pharynx: Oropharynx is clear. No oropharyngeal exudate or posterior oropharyngeal erythema. Eyes:      General: No scleral icterus. Right eye: No discharge. Left eye: No discharge. Conjunctiva/sclera: Conjunctivae normal.      Pupils: Pupils are equal, round, and reactive to light. Neck:      Comments: No nuchal rigidity  Cardiovascular:      Rate and Rhythm: Normal rate and regular rhythm. Heart sounds: Normal heart sounds. No murmur heard. No friction rub. No gallop. Pulmonary:      Effort: Pulmonary effort is normal. No respiratory distress. Breath sounds: Normal breath sounds. No wheezing or rales. Chest:      Chest wall: No tenderness. Abdominal:      General: Abdomen is flat. Bowel sounds are normal. There is no distension. Palpations: Abdomen is soft. There is no mass. Tenderness: There is no abdominal tenderness. There is no guarding or rebound. Musculoskeletal:         General: Normal range of motion. Cervical back: Normal range of motion and neck supple.    Skin: General: Skin is warm and dry. Neurological:      Mental Status: She is alert and oriented to person, place, and time. She is not disoriented. GCS: GCS eye subscore is 4. GCS verbal subscore is 5. GCS motor subscore is 6. Cranial Nerves: No cranial nerve deficit. Sensory: No sensory deficit. Motor: No tremor, abnormal muscle tone or seizure activity. Coordination: Coordination normal.      Comments: Finger to nose normal   Psychiatric:         Attention and Perception: Attention normal.         Mood and Affect: Mood normal.         Speech: Speech normal.         Behavior: Behavior normal.         Thought Content: Thought content is delusional. Thought content includes suicidal ideation. Thought content does not include homicidal ideation. Thought content includes suicidal plan. Thought content does not include homicidal plan.          MEDICAL DECISION MAKING    Vitals:    Vitals:    08/17/21 0950   BP: (!) 138/98   Pulse: 98   Resp: 17   Temp: 98.4 °F (36.9 °C)   TempSrc: Oral   SpO2: 97%   Weight: 215 lb 6.2 oz (97.7 kg)       LABS:  Labs Reviewed   COMPREHENSIVE METABOLIC PANEL - Abnormal; Notable for the following components:       Result Value    Glucose 122 (*)     All other components within normal limits    Narrative:     Performed at:  [facility]  [address] [facility] [address]   Phone [phone]   URINE DRUG SCREEN - Abnormal; Notable for the following components:    Cannabinoid Scrn, Ur POSITIVE (*)     All other components within normal limits    Narrative:     Performed at:  [facility]  [address] [place], [place] [address]   Phone [phone]   ACETAMINOPHEN LEVEL - Abnormal; Notable for the following components:    Acetaminophen Level <5 (*)     All other components within normal limits    Narrative:     Performed at:  [facility]  [address] [facility] [place] [address]   Phone [phone]   SALICYLATE LEVEL - Abnormal; Notable for the following components:    Salicylate, Serum <3.8 (*)     All other components within normal limits    Narrative:     Performed at:  [facility]  [address] [place], [place] [address]   Phone [phone], RAPID    Narrative:     Performed at:  [facility]  [address] [facility] [place] [address]   Phone [phone]   CBC WITH AUTO DIFFERENTIAL    Narrative:     Performed at:  [facility]  [address] [facility] [address] [place] [address]   Phone [phone]   ETHANOL    Narrative:     Performed at:  [facility]  [address] [facility] [place] [address]   Phone [phone]   URINE RT REFLEX TO CULTURE    Narrative:     Performed at:  [facility]  [address] [facility] [place] [address]   Phone [phone]   PREGNANCY, URINE    Narrative:     Performed at:  [facility]  [address] [facility] [place] [address]   Phone [phone] of labs reviewed and were negative at this time or not returned at the time of this note. RADIOLOGY:   Non-plain film images such as CT, Ultrasound and MRI are read by the radiologist. Roger Baig PA-C have directly visualized the radiologic plain film image(s) with the below findings:      Interpretation per the Radiologist below, if available at the time of this note:    No orders to display        No results found. MEDICAL DECISION MAKING / ED COURSE:      PROCEDURES:   Procedures    None    Patient was given:  Medications   acetaminophen (TYLENOL) tablet 1,000 mg (1,000 mg Oral Given 8/17/21 1112)     Emergency room course: Patient on exam pupils are equal round and reactive to light extraocular movement is intact.   Cardiovascular regular - - -

## 2021-08-17 NOTE — ED PROVIDER NOTES
Attending Supervisory Note/Shared Visit   I have personally performed a face to face diagnostic evaluation on this patient. I have reviewed the mid-levels findings and agree. History and Exam by me shows alert white female no acute distress. She has a history of depression. She is currently being treated for depression. She contacted her provider yesterday and had a teleevaluation. She is having suicidal thoughts. Her provider wanted her to come in to be seen and evaluated for admission. Her friend committed suicide by using carbon monoxide and she said that if she did it that is what she would do, it is painless. Heart: Regular rate and rhythm. No murmurs or gallops noted. Lungs: Breath sounds equal bilaterally and clear. Abdomen: Soft, nondistended, nontender. Mental status: Awake alert and oriented. Cooperative. Suicidal ideations as above. No homicidal ideations. No hallucinations or delusions. Medical clearance work-up initiated including Covid testing. 72-hour hold signed. EKG: Normal sinus rhythm, rate of 93, no acute ST-T wave changes. Rhythm strip shows sinus rhythm with a rate of 93, MO interval 134 ms, QRS 70 ms with no other ectopy as interpreted by me. No significant change compared to 8/25/2017. Lab reviewed. Covid negative. H&H is normal.  White blood count 6900 with no shift. Electrolytes BUN and creatinine are normal.  Glucose is 122. Liver enzymes are normal.  No alcohol detected. Drug screen positive for marijuana. Acetaminophen level less than 5, salicylate level less than 0.3. Urinalysis unremarkable. hCG is negative. Patient is medically cleared for psychiatric admission. A consult has been placed for psychiatry for admission. 1255: Accepted by Dr. Kevin Villareal for admission.     (Please note that portions of this note were completed with a voice recognition program.  Efforts were made to edit the dictations but occasionally words are mis-transcribed.)    Arthur Sampson MD  Attending Emergency Physician        Anam Antoine MD  08/17/21 122 Lyn Rivero MD  08/17/21 5557

## 2021-08-18 PROBLEM — F31.30 BIPOLAR I DISORDER, MOST RECENT EPISODE (OR CURRENT) DEPRESSED (HCC): Status: ACTIVE | Noted: 2021-08-17

## 2021-08-18 LAB
EKG ATRIAL RATE: 93 BPM
EKG DIAGNOSIS: NORMAL
EKG P AXIS: 48 DEGREES
EKG P-R INTERVAL: 134 MS
EKG Q-T INTERVAL: 360 MS
EKG QRS DURATION: 78 MS
EKG QTC CALCULATION (BAZETT): 447 MS
EKG R AXIS: 18 DEGREES
EKG T AXIS: 15 DEGREES
EKG VENTRICULAR RATE: 93 BPM

## 2021-08-18 PROCEDURE — 6370000000 HC RX 637 (ALT 250 FOR IP): Performed by: PHYSICIAN ASSISTANT

## 2021-08-18 PROCEDURE — 99223 1ST HOSP IP/OBS HIGH 75: CPT | Performed by: PSYCHIATRY & NEUROLOGY

## 2021-08-18 PROCEDURE — 6370000000 HC RX 637 (ALT 250 FOR IP): Performed by: PSYCHIATRY & NEUROLOGY

## 2021-08-18 PROCEDURE — 99222 1ST HOSP IP/OBS MODERATE 55: CPT | Performed by: PHYSICIAN ASSISTANT

## 2021-08-18 PROCEDURE — 93010 ELECTROCARDIOGRAM REPORT: CPT | Performed by: INTERNAL MEDICINE

## 2021-08-18 PROCEDURE — 1240000000 HC EMOTIONAL WELLNESS R&B

## 2021-08-18 RX ORDER — PANTOPRAZOLE SODIUM 40 MG/1
40 TABLET, DELAYED RELEASE ORAL EVERY EVENING
Status: DISCONTINUED | OUTPATIENT
Start: 2021-08-18 | End: 2021-08-23 | Stop reason: HOSPADM

## 2021-08-18 RX ORDER — DICYCLOMINE HYDROCHLORIDE 10 MG/1
10 CAPSULE ORAL 3 TIMES DAILY PRN
Status: DISCONTINUED | OUTPATIENT
Start: 2021-08-18 | End: 2021-08-23 | Stop reason: HOSPADM

## 2021-08-18 RX ORDER — LEVOTHYROXINE SODIUM 0.03 MG/1
25 TABLET ORAL DAILY
Status: DISCONTINUED | OUTPATIENT
Start: 2021-08-18 | End: 2021-08-18

## 2021-08-18 RX ORDER — LEVOTHYROXINE SODIUM 0.03 MG/1
25 TABLET ORAL EVERY EVENING
Status: DISCONTINUED | OUTPATIENT
Start: 2021-08-18 | End: 2021-08-18 | Stop reason: SDUPTHER

## 2021-08-18 RX ORDER — GABAPENTIN 100 MG/1
100 CAPSULE ORAL NIGHTLY
Status: DISCONTINUED | OUTPATIENT
Start: 2021-08-18 | End: 2021-08-18

## 2021-08-18 RX ORDER — ALBUTEROL SULFATE 90 UG/1
2 AEROSOL, METERED RESPIRATORY (INHALATION) DAILY PRN
Status: DISCONTINUED | OUTPATIENT
Start: 2021-08-18 | End: 2021-08-23 | Stop reason: HOSPADM

## 2021-08-18 RX ORDER — LEVOTHYROXINE SODIUM 0.03 MG/1
25 TABLET ORAL EVERY EVENING
Status: DISCONTINUED | OUTPATIENT
Start: 2021-08-19 | End: 2021-08-23 | Stop reason: HOSPADM

## 2021-08-18 RX ORDER — MECOBALAMIN 5000 MCG
10 TABLET,DISINTEGRATING ORAL NIGHTLY
Status: DISCONTINUED | OUTPATIENT
Start: 2021-08-18 | End: 2021-08-23 | Stop reason: HOSPADM

## 2021-08-18 RX ORDER — PANTOPRAZOLE SODIUM 40 MG/1
40 TABLET, DELAYED RELEASE ORAL
Status: DISCONTINUED | OUTPATIENT
Start: 2021-08-19 | End: 2021-08-18

## 2021-08-18 RX ORDER — QUETIAPINE 200 MG/1
400 TABLET, FILM COATED, EXTENDED RELEASE ORAL NIGHTLY
Status: DISCONTINUED | OUTPATIENT
Start: 2021-08-18 | End: 2021-08-23 | Stop reason: HOSPADM

## 2021-08-18 RX ORDER — QUETIAPINE 150 MG/1
300 TABLET, FILM COATED, EXTENDED RELEASE ORAL ONCE
Status: COMPLETED | OUTPATIENT
Start: 2021-08-18 | End: 2021-08-18

## 2021-08-18 RX ORDER — GABAPENTIN 300 MG/1
300 CAPSULE ORAL EVERY EVENING
Status: DISCONTINUED | OUTPATIENT
Start: 2021-08-18 | End: 2021-08-23 | Stop reason: HOSPADM

## 2021-08-18 RX ORDER — LEVOTHYROXINE SODIUM 0.03 MG/1
25 TABLET ORAL EVERY EVENING
Status: DISCONTINUED | OUTPATIENT
Start: 2021-08-18 | End: 2021-08-18

## 2021-08-18 RX ADMIN — GABAPENTIN 300 MG: 300 CAPSULE ORAL at 21:22

## 2021-08-18 RX ADMIN — LEVOTHYROXINE SODIUM 25 MCG: 25 TABLET ORAL at 10:12

## 2021-08-18 RX ADMIN — PANTOPRAZOLE SODIUM 40 MG: 40 TABLET, DELAYED RELEASE ORAL at 21:22

## 2021-08-18 RX ADMIN — QUETIAPINE FUMARATE 300 MG: 150 TABLET, EXTENDED RELEASE ORAL at 00:37

## 2021-08-18 RX ADMIN — QUETIAPINE FUMARATE 400 MG: 200 TABLET, EXTENDED RELEASE ORAL at 21:21

## 2021-08-18 RX ADMIN — Medication 10 MG: at 21:21

## 2021-08-18 ASSESSMENT — SLEEP AND FATIGUE QUESTIONNAIRES
DO YOU HAVE DIFFICULTY SLEEPING: YES
DIFFICULTY STAYING ASLEEP: YES
DIFFICULTY ARISING: YES
AVERAGE NUMBER OF SLEEP HOURS: 9
SLEEP PATTERN: DIFFICULTY FALLING ASLEEP;DIFFICULTY ARISING;NIGHTMARES/TERRORS;DISTURBED/INTERRUPTED SLEEP
DO YOU USE A SLEEP AID: YES
DIFFICULTY FALLING ASLEEP: YES
RESTFUL SLEEP: NO

## 2021-08-18 ASSESSMENT — PATIENT HEALTH QUESTIONNAIRE - PHQ9: SUM OF ALL RESPONSES TO PHQ QUESTIONS 1-9: 26

## 2021-08-18 ASSESSMENT — LIFESTYLE VARIABLES: HISTORY_ALCOHOL_USE: NO

## 2021-08-18 ASSESSMENT — PAIN SCALES - GENERAL: PAINLEVEL_OUTOF10: 0

## 2021-08-18 NOTE — BH NOTE
Jennie Sharma arrived on the unit per medical transport. She was calm and cooperative with vital sign assessment and initial orientation on arrival.  Dinner ordered and is being prepared. Pt is on the phone with  at this time.
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
## 2021-08-18 NOTE — BH NOTE
Pt give Seroquel 300 mg po at 0037 for sleeplessness. Dr. Nito Patel ordered this as a one time order.  The medication was effective
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
## 2021-08-18 NOTE — PROGRESS NOTES
Behavioral Services  Medicare Certification Upon Admission    I certify that this patient's inpatient psychiatric hospital admission is medically necessary for:    [x] (1) Treatment which could reasonably be expected to improve this patient's condition,       [x] (2) Or for diagnostic study;     AND     [x](2) The inpatient psychiatric services are provided while the individual is under the care of a physician and are included in the individualized plan of care.     Estimated length of stay/service 5-6 days    Plan for post-hospital care incomplete     Electronically signed by Debra Bhakta MD on 8/18/2021 at 11:58 AM

## 2021-08-18 NOTE — PROGRESS NOTES
Patient was informed that she is able to have someone bring in her Cpap or Bipap from home. She said that she will be fine because she does not have anyone to bring it today.

## 2021-08-18 NOTE — BH NOTE
Purposeful Rounding    Patient Location: Group room    Patient willing to engage in conversation: Yes    Presentation/behavior: Cooperative and Pleasant    Affect: Neutral/Euthymic(normal)    Concerns reported: none    PRN medications given: none    Environmental assessment: Room free from clutter and Clear path to bathroom     Fall prevention interventions in place: Yellow non-skid socks on    Daily [place] Fall Risk Score: 59    Daily Torres Fall Risk Score: 0

## 2021-08-18 NOTE — GROUP NOTE
Group Therapy Note    Date: 8/17/2021    Group Start Time: 1955  Group End Time: 2020  Group Topic: [facility]        Group Therapy Note    Attendees: goals and importance of goal setting discussed. Night time milieu activities discussed. Patient's Goal:  Get here, stay alive    Notes:  Successful, first day    Status After Intervention:  Improved    Participation Level:  Active Listener and Interactive    Participation Quality: Appropriate and Attentive      Speech:  normal      Thought Process/Content: Logical  Linear      Affective Functioning: Congruent      Mood: anxious      Level of consciousness:  Alert and Oriented x4      Response to Learning: Progressing to goal      Endings: None Reported    Modes of Intervention: Support      Discipline Responsible: Get Together      Signature:  Jessica Batres

## 2021-08-18 NOTE — PROGRESS NOTES
Patient reports that she takes all her medications at bedtime. Writer spoke with Pharmacy about changing all her medications to be given at one time a day at .

## 2021-08-18 NOTE — BH NOTE
.Purposeful Rounding  0400       Patient Location: Patient room    Patient willing to engage in conversation: No    Presentation/behavior: sleeping    Affect: sleeping    Concerns reported: none    PRN medications given: Seroquel 300 mg  Po asa one time dose at 0037  Which was effective.     Environmental assessment: Room free from clutter, Clear path to bathroom  and Adequate lighting    Fall prevention interventions in place: not needed    Daily [place] Fall Risk Score: 59    Daily Torres Fall Risk Score: 0

## 2021-08-18 NOTE — FLOWSHEET NOTE
Purposeful Rounding    Patient Location Patient room    Patient willing to engage in conversationNo    Presentation/behavior Cooperative    Affect Neutral/Euthymic(normal)    Concerns reported: none    PRN medications given:no    Environmental assessment Room free from clutter, Clear path to bathroom  and Adequate lighting    Fall prevention interventions in place: Yellow non-skid socks on    Daily [place] Fall Risk Score :59    Daily Torres Fall Risk Score : low

## 2021-08-18 NOTE — FLOWSHEET NOTE
Purposeful Rounding    Patient Location Day room    Patient willing to engage in conversationYes    Presentation/behavior Cooperative and Pleasant    Affect Brightens with interaction    Concerns reported: none    PRN medications given:no    Environmental assessment Room free from clutter, Clear path to bathroom  and Adequate lighting    Fall prevention interventions in place: Yellow non-skid socks on    Daily [place] Fall Risk Score :59    Daily Trores Fall Risk Score : low

## 2021-08-18 NOTE — H&P
Hospital Medicine History & Physical      PCP: Jo Gates MD    Date of Admission: 8/17/2021    Date of Service: Pt seen/examined on 8/18/2021    Chief Complaint:  No chief complaint on file. History Of Present Illness: The patient is a 33 y.o. female with a PMH of RLS, Hypothyroidism, GERD, Moderate Persistent Asthma, MARV, IBS who presented to [facility] for suicidal ideation. Patient was seen and evaluated in the ED by the ED medical provider, patient was medically cleared for admission to [facility] at [facility]. This note serves as an admission medical H&P.   PCP: Jo Gates MD   Tobacco Use: None  EtOH Use: none  Illicit Drug Use: UDS + cannabis, reports taking CBD gummies    Pt denies any medical concerns at this time. Past Medical History:        Diagnosis Date    Abnormal Pap smear     Colposcopy; HPV.  Abnormal Pap smear of cervix     Colposcopy positive for HPV.  Abnormal umbilical cord     peripheral cord insertion. monitor growth.  ADHD (attention deficit hyperactivity disorder)     no meds currently.  Anxiety     Currently taking Fluoxitine    Asthma     uses daily inhaler and rescue inhaler PRN.  Bipolar 1 disorder (HCC)     was taking prozac early in pregnancy. switched to Quetiapine, Class C, consider weaning in 3rd trimester.  Chronic GERD     Diabetes mellitus ([facility] [address]     GDM-insulin dependent    Endometriosis     Febrile convulsions (simple), unspecified     Febrile seizure as an infant one time    History of chicken pox 1/1/89    HPV in female     Irritable bowel     Obstructive sleep apnea, adult     Positive PPD, treated     negative chest xray.  Postpartum depression     Pre-eclampsia in third trimester 12/9/2012    with G1. Baseline PIH labs and 24 hour urine WNL with G2.    Prediabetes 6/13/2018    Thyroid disease     Hypothyroid. taking Synthroid.     Vertigo        Past Surgical History:        Procedure Laterality Date    BREAST SURGERY  2007 reduction     SECTION      LTCS for suspected macrosomia and PIH. Medications Prior to Admission:    Prior to Admission medications    Medication Sig Start Date End Date Taking? Authorizing Provider   gabapentin (NEURONTIN) 100 MG capsule TAKE 1-3 CAPSULES BY MOUTH ONCE NIGHTLY 21 Yes Loren Balderrama MD   levothyroxine (SYNTHROID) 25 MCG tablet TAKE ONE TABLET BY MOUTH DAILY 21  Yes Loren Balderrama MD   QUEtiapine (SEROQUEL XR) 300 MG extended release tablet TAKE ONE TABLET BY MOUTH DAILY 21  Yes Loren Balderrama MD   omeprazole (PRILOSEC) 40 MG delayed release capsule TAKE ONE CAPSULE BY MOUTH DAILY 3/29/21  Yes Loren Balderrama MD   albuterol sulfate HFA (VENTOLIN HFA) 108 (90 Base) MCG/ACT inhaler Inhale 2 puffs into the lungs daily as needed for Wheezing or Shortness of Breath 20  Yes Alba Chaudhry MD   melatonin 3 MG TABS tablet Take 3 mg by mouth daily   Yes Historical Provider, MD   dicyclomine (BENTYL) 20 MG tablet TAKE ONE TABLET BY MOUTH FOUR TIMES A DAY 3/30/21   Loren Balderrama MD   albuterol (PROVENTIL) (2.5 MG/3ML) 0.083% nebulizer solution Take 3 mLs by nebulization every 6 hours as needed for Wheezing 20  Loren Balderrama MD   Cholecalciferol (VITAMIN D3) 250 MCG (07126 UT) CAPS Take 1 capsule by mouth daily 20  Loren Balderrama MD       Allergies:  Latex, Kiwi extract, Tramadol, Zoloft [sertraline hcl], Fluoxetine, Shellfish-derived products, and Wellbutrin [bupropion hcl]    Social History:  The patient currently lives with spouse. TOBACCO:   reports that she has never smoked. She has never used smokeless tobacco.  ETOH:   reports no history of alcohol use.       Family History:   Positive as follows:        Problem Relation Age of Onset    Diabetes Mother     Alcohol Abuse Mother     Depression Mother     Arthritis Mother     Mental Illness Father         bipolar    Heart Disease Father 43        MI x 3    Hypertension Father     Elevated Lipids Father     Coronary Art Dis Father     Depression Father     COPD Father     Arthritis Father     Cancer Sister         cervical    Depression Sister     Heart Failure Maternal Grandmother     Asthma Maternal Grandmother        REVIEW OF SYSTEMS:     Constitutional: Negative for fever   HENT: Negative for sore throat   Eyes: Negative for redness   Respiratory: Negative  for dyspnea, cough   Cardiovascular: Negative for chest pain   Gastrointestinal: Negative for vomiting, diarrhea   Genitourinary: Negative for hematuria   Musculoskeletal: Negative for arthralgias   Skin: Negative for rash   Neurological: Negative for syncope   Hematological: Negative for adenopathy   Psychiatric/Behavorial: Negative for anxiety    PHYSICAL EXAM:    /63   Pulse 102   Temp 97.5 °F (36.4 °C) (Temporal)   Resp 16   Ht 5' 6\" (1.676 m)   Wt 207 lb (93.9 kg)   SpO2 98%   BMI 33.41 kg/m²   Gen: No distress. Alert. [address] [place] [address]  female  Eyes: PERRL. No sclera icterus. No conjunctival injection. ENT: No discharge. Pharynx clear. Neck:  Trachea midline. Resp: No accessory muscle use. No crackles. No wheezes. No rhonchi. CV: Regular rate. Regular rhythm. No murmur. No rub. No edema. GI: Soft, obese, Non-tender. Non-distended. Skin: Warm and dry. No rash on exposed extremities. Neuro: Awake. Grossly nonfocal, CN II-XII intact, moves all extremities, follows commands, no dysarthria    Psych: Defer to psychiatry.     CBC:   Recent Labs     08/17/21  1043   WBC 6.9   HGB 14.9   HCT 44.0   MCV 85.6        BMP:   Recent Labs     08/17/21  1043      K 4.0      CO2 25   BUN 10   CREATININE 0.7     LIVER PROFILE:   Recent Labs     08/17/21  1043   AST 18   ALT 24   BILITOT <0.2   ALKPHOS 99     UA:  Recent Labs     08/17/21  1043   COLORU YELLOW   PHUR 6.0  6.0   CLARITYU Clear   SPECGRAV 1.011   LEUKOCYTESUR Negative   UROBILINOGEN 0.2   BILIRUBINUR Negative   BLOODU Negative   GLUCOSEU Negative      U/A:    Lab Results   Component Value Date    NITRITE NEGATIVE 10/28/2020    COLORU YELLOW 08/17/2021    WBCUA 1 07/10/2018    RBCUA 4 07/10/2018    BACTERIA 1+ 04/05/2018    CLARITYU Clear 08/17/2021    SPECGRAV 1.011 08/17/2021    LEUKOCYTESUR Negative 08/17/2021    BLOODU Negative 08/17/2021    GLUCOSEU Negative 08/17/2021    GLUCOSEU NEGATIVE 06/04/2012    AMORPHOUS 3+ 04/05/2018     CULTURES  N/A    EKG:    Normal sinus rhythm rate of 93  Normal ECG  When compared with ECG of 25-AUG-2017 16:55,  No significant change was found    RADIOLOGY  N/A    ASSESSMENT/PLAN:    Suicidal Ideation  Mood DO  - cont mgmt per [facility]    Hypothyroidism  - cont home dose of Synthroid 25 mcg     GERD  - cont PPI    Moderate Persistent Asthma  - w/o AE  - cont PRN Albuterol    IBS  - supportive measures    MRAV  - will order home CPAP    RLS  - cont Gabapentin    Cannabis Abuse  - UDS +  - counseled cessation    Obesity  - Body mass index is 33.41 kg/m². - Counseled on weight loss. Pt has no medical complaints at this time. Pt was informed that they may have [facility] contact us should any medical concerns arise during this admission.     Soren Black PA-C 9:06 AM 8/18/2021

## 2021-08-18 NOTE — BH NOTE
Purposeful Rounding      Patient Location: Patient room    Patient willing to engage in conversation: Yes    Presentation/behavior: unable to sleep    Affect: Anxious and Depressed    Concerns reported: inability to sleep    PRN medications given: contacting Dr. Gil Ford    Environmental assessment: Room free from clutter, Clear path to bathroom  and Adequate lighting    Fall prevention interventions in place: Yellow non-skid socks on    Daily Haroldo Fall Risk Score: 59    Daily Torres Fall Risk Score: 0

## 2021-08-18 NOTE — FLOWSHEET NOTE
Purposeful Rounding    Patient Location Patient room    Patient willing to engage in conversationYes    Presentation/behavior Cooperative and Pleasant    Affect Neutral/Euthymic(normal)    Concerns reported: none    PRN medications given:no    Environmental assessment Room free from clutter, Clear path to bathroom  and Adequate lighting    Fall prevention interventions in place: Yellow non-skid socks on    Daily [place] Fall Risk Score :59    Daily Torres Fall Risk Score : low

## 2021-08-19 PROCEDURE — 99233 SBSQ HOSP IP/OBS HIGH 50: CPT | Performed by: PSYCHIATRY & NEUROLOGY

## 2021-08-19 PROCEDURE — 1240000000 HC EMOTIONAL WELLNESS R&B

## 2021-08-19 PROCEDURE — 6370000000 HC RX 637 (ALT 250 FOR IP): Performed by: PSYCHIATRY & NEUROLOGY

## 2021-08-19 RX ORDER — LAMOTRIGINE 25 MG/1
50 TABLET ORAL 2 TIMES DAILY
Status: DISCONTINUED | OUTPATIENT
Start: 2021-08-19 | End: 2021-08-20

## 2021-08-19 RX ADMIN — Medication 10 MG: at 21:23

## 2021-08-19 RX ADMIN — LAMOTRIGINE 50 MG: 25 TABLET ORAL at 10:55

## 2021-08-19 RX ADMIN — PANTOPRAZOLE SODIUM 40 MG: 40 TABLET, DELAYED RELEASE ORAL at 21:24

## 2021-08-19 RX ADMIN — LAMOTRIGINE 50 MG: 25 TABLET ORAL at 21:24

## 2021-08-19 RX ADMIN — GABAPENTIN 300 MG: 300 CAPSULE ORAL at 21:24

## 2021-08-19 RX ADMIN — LEVOTHYROXINE SODIUM 25 MCG: 25 TABLET ORAL at 21:23

## 2021-08-19 RX ADMIN — QUETIAPINE FUMARATE 400 MG: 200 TABLET, EXTENDED RELEASE ORAL at 21:23

## 2021-08-19 NOTE — PROGRESS NOTES
Purposeful Rounding    Patient Location: Patient room    Patient willing to engage in conversation: No    Presentation/behavior: Other Asleep*    Affect: Neutral/Euthymic(normal)    Concerns reported: No    PRN medications given: No    Environmental assessment: Room free from clutter and Clear path to bathroom     Fall prevention interventions in place: N/A    Daily Blum Fall Risk Score: 59    Daily Torres Fall Risk Score: 0

## 2021-08-19 NOTE — PROGRESS NOTES
Purposeful Rounding     Patient Location: Patient room     Patient willing to engage in conversation: No     Presentation/behavior: Other Asleep*     Affect: Neutral/Euthymic(normal)     Concerns reported:  No     PRN medications given: No     Environmental assessment: Room free from clutter and Clear path to bathroom      Fall prevention interventions in place: N/A     Daily Converse Fall Risk Score: 59     Daily Torres Fall Risk Score: 0

## 2021-08-19 NOTE — GROUP NOTE
Group Therapy Note    Date: 8/18/2021    Group Start Time: 2000  Group End Time: 2020  Group Topic: [facility]        Group Therapy Note    Attendees: Goals and importance of goal setting discussed. Night time milieu activities discussed. Patient's Goal:  Meet drs    Notes:  Successful     Status After Intervention:  Improved    Participation Level:  Active Listener and Interactive    Participation Quality: Appropriate and Attentive      Speech:  normal      Thought Process/Content: Logical  Linear      Affective Functioning: Congruent      Mood: euthymic      Level of consciousness:  Alert and Oriented x4      Response to Learning: Progressing to goal      Endings: None Reported    Modes of Intervention: Support      Discipline Responsible: Behavorial Health Tech      Signature:  Willy Ledesma

## 2021-08-19 NOTE — PLAN OF CARE
Problem: Depressive Behavior With or Without Suicide Precautions:  Goal: Able to verbalize acceptance of life and situations over which he or she has no control  Description: Able to verbalize acceptance of life and situations over which he or she has no control  Outcome: Ongoing     Problem: Depressive Behavior With or Without Suicide Precautions:  Goal: Able to verbalize and/or display a decrease in depressive symptoms  Description: Able to verbalize and/or display a decrease in depressive symptoms  8/19/2021 0913 by Mark Dorsey RN  Outcome: Ongoing     Problem: Depressive Behavior With or Without Suicide Precautions:  Goal: Absence of self-harm  Description: Absence of self-harm  8/19/2021 0913 by Mark Dorsey RN  Outcome: Ongoing       Pt visible and social on unit this AM. Reports she feels the same as prior to coming into the hospital. Reports a wish to be dead but no active suicidal ideation. Denies HI/AVH. Flat affect when interacting with staff. Reports anxiety is 6/10, she states she is always 6/10. Denies any other needs at this time.

## 2021-08-19 NOTE — GROUP NOTE
Group Therapy Note    Date: 2021    Group Start Time: 1100  Group End Time: 470  Group Topic: Psychotherapy    [address]        Group Therapy Note    Attendees: 4         Patient's Goal:  Patient was invited to participate in Psychotherapy group and to set a goal at the beginning of session to practice in group a new way of being in relationships. Notes:  Bernardo Hayes shared her goal was to Cooper with people again. \" Bernardo Hayes stated, \"In the past year, my dad and my grandma . 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 Bernardo Hayes reported this led to her experiencing suicidal thoughts: \"I thought about killing myself. I didn't want my children to grieve, so I thought would take them and my  with me with carbon monoxide poisoning. I knew I needed to come here. I'm so ashamed. This is the first time I've ever said this out loud. \" Bernardo Hayes stated she was not currently experiencing suicidal or homicidal ideation and that \"I never had any intention of acting on the thoughts. They frightened me so I came here for help. I don't want my children taken away from me, so I was scared to tell anyone. \" Therapist provided Bernardo Hayes with empathetic listening and support. After the close of group, therapist relayed Adamaris's statements to her psychiatrist who determined CPS did not need to be contacted as Bernardo Hayes had no intention or plan to act on her intrusive thinking and was no longer experiencing SI/HI. The psychiatrist stated he would check in with [address] [place] [address] Avenue stated she was agreeable to talking with the psychiatrist about what she shared in group and open to a family meeting with a therapist and her  during visiting hours on 21. Status After Intervention:  Improved    Participation Level:  Active Listener and Interactive    Participation Quality: Appropriate, Attentive, Sharing and Supportive      Speech:  normal      Thought Process/Content: Logical      Affective Functioning: Blunted      Mood: anxious and depressed      Level of consciousness:  Alert, Oriented x4 and Attentive      Response to Learning: Able to verbalize current knowledge/experience, Able to verbalize/acknowledge new learning, Able to retain information and Capable of insight      Endings: None Reported    Modes of Intervention: Education, Support, Socialization, Exploration, Clarifying and Problem-solving      Discipline Responsible: Psychoeducational Specialist      Signature:  Shauna Garcia

## 2021-08-19 NOTE — GROUP NOTE
Group Therapy Note    Date: 8/19/2021    Group Start Time: 1000  Group End Time: 0719  Group Topic: Cognitive Skills    [facility], LEOPOLDO, LICDC, LSW      Group Therapy Note    Attendees: 10         Patient's Goal:  Learn/discuss the cognitive model, common thought distortions and ways to challenge ANTs. Notes:  Pt participated in group discussion. Status After Intervention:  Improved    Participation Level:  Active Listener and Interactive    Participation Quality: Appropriate, Attentive and Sharing      Speech:  normal      Thought Process/Content: Logical      Affective Functioning: Congruent      Mood: euthymic      Level of consciousness:  Alert, Oriented x4 and Attentive      Response to Learning: Able to verbalize current knowledge/experience, Able to verbalize/acknowledge new learning, Able to retain information, Capable of insight, Able to change behavior and Progressing to goal      Endings: None Reported    Modes of Intervention: Education, Support, Socialization, Exploration, Clarifying, Problem-solving, Confrontation, Limit-setting and Reality-testing      Discipline Responsible: /Counselor      Signature:  LEOPOLDO [address]

## 2021-08-19 NOTE — PROGRESS NOTES
Purposeful Rounding    Patient Location: Patient room    Patient willing to engage in conversation: Yes    Presentation/behavior: Cooperative and Pleasant    Affect: Neutral/Euthymic(normal)    Concerns reported: No    PRN medications given: No    Environmental assessment: Room free from clutter, Clear path to bathroom  and No safety hazards noted    Fall prevention interventions in place: N/A    Daily Haroldo Fall Risk Score: 59    Daily Torres Fall Risk Score: 0

## 2021-08-19 NOTE — PLAN OF CARE
Patient was out with patient group,early in the shift & was social. Patient was verbal in 1:1 & denied feelings of self harm. Patient reported feeling more hopeful, since having a place to stay. Patient was noted using  the phone, once.  Jhoan Call R.N.

## 2021-08-19 NOTE — PROGRESS NOTES
Department of Psychiatry  Progress Note    Patient's chart was reviewed. Discussed with treatment team. Met with patient. SUBJECTIVE:      Reports sleeping better last night; laughed this morning for the first time in a while. Continues with intermittent thoughts of suicide. Dicussed medication options again at length. She's gained 70lbs since starting Quetiapine and would like to transition to another mood stabilizer. She chose Lamictal.     Behaviorally stable.      ROS:   Patient has new complaints: no  Sleeping adequately:  Yes   Appetite adequate: Yes  Engaged in programming: Yes    OBJECTIVE:  VITALS:  /85   Pulse 102   Temp 98.4 °F (36.9 °C) (Temporal)   Resp 16   Ht 5' 6\" (1.676 m)   Wt 207 lb (93.9 kg)   SpO2 97%   BMI 33.41 kg/m²     Mental Status Examination:    Appearance: fair grooming and hygiene  Behavior/Attitude toward examiner:  cooperative, attentive and fair eye contact  Speech: Normal rate, volume, amount  Mood:  \"better\"  Affect:  blunted     Thought processes:  Goal directed, linear, no FERCHO or gross disorganization  Thought Content: less SI, no HI, no delusions voiced, no obsessions  Perceptions: no AVH  Attention: attention span and concentration were intact to interview   Abstraction: intact  Cognition:  Alert and oriented to person, place, time, and situation, recall intact  Insight: fair  Judgment: Limited judgment     Medication:  Scheduled:   lamoTRIgine  50 mg Oral BID    gabapentin  300 mg Oral QPM    pantoprazole  40 mg Oral QPM    QUEtiapine  400 mg Oral Nightly    melatonin  10 mg Oral Nightly    levothyroxine  25 mcg Oral QPM        PRN:  albuterol sulfate HFA, dicyclomine, acetaminophen, hydrOXYzine, traZODone, nicotine polacrilex     FORMULATION:  This is a domiciled, , unemployed 56-year-old with  a self-reported history of bipolar disorder, who presented to [facility]  emergency department with increasing symptoms of depression and thoughts  of suicide. The patient meets criteria for bipolar 1 disorder, most  recently depressed without psychotic features. Review of her history  reveals at least one manic episode occurring in 2008 and repeated  episodes of depression. Given the severity of her symptoms and  increasing thoughts of suicide, she requires inpatient stabilization and  treatment. Principal Problem:    Bipolar I disorder, most recent episode (or current) depressed ([address]  Active Problems:    GERD (gastroesophageal reflux disease)    Irritable bowel    Insomnia    Hypothyroidism  Resolved Problems:    * No resolved hospital problems. *     PLAN:  1. Admitted to Inpatient Psychiatry for stabilization and treatment. 2.  On admission, Increased Seroquel to 400 mg nightly. Discussed alternatives at length  including other mood stabilizers. Ordered q. 15-minute checks for safety,  programming, and p.r.n. medication for anxiety, agitation, and insomnia. 8/19/2021- start lamictal 50mg BID. Goal is monotherapy with Lamictal.     3.  Internal Medicine consult for admission. Hypothyroidism  - cont home dose of Synthroid 25 mcg      GERD  - cont PPI     Moderate Persistent Asthma  - w/o AE  - cont PRN Albuterol      IBS  - supportive measures     MARV  - will order home CPAP     RLS  - cont Gabapentin     Obesity   - Body mass index is 33.41 kg/m². - Counseled on weight loss. 4.   Estimated length of stay 5-8 days for stabilization. The patient is  Voluntary. Total time with patient was 35 minutes and more than 50 % of that time was spent counseling the patient on their symptoms, treatment, and expected goals.      Jasmyn Alex MD

## 2021-08-19 NOTE — FLOWSHEET NOTE
Group Therapy Note    Date: 8/19/2021  Start Time: 1300  End Time:  1400  Number of Participants: 10    Type of Group: Music Group    Notes:  Pt present for Music Group. Pt actively participated by making song selections and singing along to music. Participation Level:  Active Listener and Interactive    Participation Quality: Appropriate and Attentive      Speech:  normal      Affective Functioning: Congruent      Endings: None Reported    Modes of Intervention: Support, Socialization, Activity and Media      Discipline Responsible: Chaplain Joellen Dyson       08/19/21 1440   Encounter Summary   Services provided to: Patient   Continue Visiting   (8/19 Music Group)   Complexity of Encounter Moderate   Length of Encounter 1 hour

## 2021-08-20 PROCEDURE — 6370000000 HC RX 637 (ALT 250 FOR IP): Performed by: PSYCHIATRY & NEUROLOGY

## 2021-08-20 PROCEDURE — 99233 SBSQ HOSP IP/OBS HIGH 50: CPT | Performed by: PSYCHIATRY & NEUROLOGY

## 2021-08-20 PROCEDURE — 1240000000 HC EMOTIONAL WELLNESS R&B

## 2021-08-20 RX ORDER — LAMOTRIGINE 25 MG/1
75 TABLET ORAL 2 TIMES DAILY
Status: DISCONTINUED | OUTPATIENT
Start: 2021-08-22 | End: 2021-08-21

## 2021-08-20 RX ORDER — LAMOTRIGINE 25 MG/1
50 TABLET ORAL 2 TIMES DAILY
Status: DISCONTINUED | OUTPATIENT
Start: 2021-08-20 | End: 2021-08-21

## 2021-08-20 RX ADMIN — LAMOTRIGINE 50 MG: 25 TABLET ORAL at 21:34

## 2021-08-20 RX ADMIN — LAMOTRIGINE 50 MG: 25 TABLET ORAL at 08:33

## 2021-08-20 RX ADMIN — Medication 10 MG: at 21:34

## 2021-08-20 RX ADMIN — GABAPENTIN 300 MG: 300 CAPSULE ORAL at 21:33

## 2021-08-20 RX ADMIN — QUETIAPINE FUMARATE 400 MG: 200 TABLET, EXTENDED RELEASE ORAL at 21:33

## 2021-08-20 RX ADMIN — LEVOTHYROXINE SODIUM 25 MCG: 25 TABLET ORAL at 21:34

## 2021-08-20 RX ADMIN — PANTOPRAZOLE SODIUM 40 MG: 40 TABLET, DELAYED RELEASE ORAL at 21:34

## 2021-08-20 NOTE — PROGRESS NOTES
[facility]  Treatment Team Note  Review Date & Time: 08/20/21 0930    Patient was not in treatment team      Status EXAM:   Status and Exam  Normal: Yes  Facial Expression: Flat  Affect: Congruent  Level of Consciousness: Alert  Mood:Normal: No  Mood: Depressed, Anxious  Motor Activity:Normal: Yes  Interview Behavior: Cooperative  Preception: Page to Person, Malia Bloch to Time, Page to Place, Page to Situation  Attention:Normal: Yes  Thought Processes: Circumstantial  Thought Content:Normal: Yes  Hallucinations: None  Delusions: No  Memory:Normal: Yes  Insight and Judgment: No  Insight and Judgment: Poor Judgment, Poor Insight  Present Suicidal Ideation: No  Present Homicidal Ideation: No      Suicide Risk CSSR-S:  1) Within the past month, have you wished you were dead or wished you could go to sleep and not wake up? : No  2) Have you actually had any thoughts of killing yourself? : No  6) Have you ever done anything, started to do anything, or prepared to do anything to end your life?: No      PLAN/TREATMENT RECOMMENDATIONS UPDATE: Patient will take medication as prescribed, eat 75% of meals, attend groups, participate in milieu activities, participate in treatment team and care planning for discharge and follow up.           Cuba Ling RN

## 2021-08-20 NOTE — FLOWSHEET NOTE
Purposeful Rounding    Patient Location: Nurses station    Patient willing to engage in conversation: Yes    Presentation/behavior: Cooperative and Pleasant    Affect: Brightens with interaction    Concerns reported: None    PRN medications given: No    Environmental assessment: No safety hazards noted    Fall prevention interventions in place: Yellow non-skid socks on    Daily Parmer Fall Risk Score: 35    Daily Torres Fall Risk Score: Low risk

## 2021-08-20 NOTE — PROGRESS NOTES
Department of Psychiatry  Progress Note    Patient's chart was reviewed. Discussed with treatment team. Met with patient. SUBJECTIVE:      Divulged in therapy yesterday that she's had intrusive thoughts of killing her family and herself. Plans to meet with therapist and  this afternoon or process further. Otherwise feels she is making gains. Mood is improving. SI is improving. Tolerating Lamictal well so far. ROS:   Patient has new complaints: no  Sleeping adequately:  Yes   Appetite adequate: Yes  Engaged in programming: Yes    OBJECTIVE:  VITALS:  /74   Pulse 113   Temp 97.7 °F (36.5 °C) (Temporal)   Resp 16   Ht 5' 6\" (1.676 m)   Wt 207 lb (93.9 kg)   SpO2 97%   BMI 33.41 kg/m²     Mental Status Examination:    Appearance: fair grooming and hygiene  Behavior/Attitude toward examiner:  cooperative, attentive and fair eye contact  Speech: Normal rate, volume, amount  Mood:  \"ok\"  Affect:  blunted     Thought processes:  Goal directed, linear, no FERCHO or gross disorganization  Thought Content: less SI, no HI, no delusions voiced, no obsessions  Perceptions: no AVH  Attention: attention span and concentration were intact to interview   Abstraction: intact  Cognition:  Alert and oriented to person, place, time, and situation, recall intact  Insight: fair  Judgment: improving      Medication:  Scheduled:   lamoTRIgine  50 mg Oral BID    gabapentin  300 mg Oral QPM    pantoprazole  40 mg Oral QPM    QUEtiapine  400 mg Oral Nightly    melatonin  10 mg Oral Nightly    levothyroxine  25 mcg Oral QPM        PRN:  albuterol sulfate HFA, dicyclomine, acetaminophen, hydrOXYzine, traZODone, nicotine polacrilex     FORMULATION:  This is a domiciled, , unemployed 78-year-old with  a self-reported history of bipolar disorder, who presented to [facility]  emergency department with increasing symptoms of depression and thoughts  of suicide.   The patient meets criteria for bipolar 1 disorder, most  recently depressed without psychotic features. Review of her history  reveals at least one manic episode occurring in 2008 and repeated  episodes of depression. Given the severity of her symptoms and  increasing thoughts of suicide, she requires inpatient stabilization and  treatment. Principal Problem:    Bipolar I disorder, most recent episode (or current) depressed ([address]  Active Problems:    GERD (gastroesophageal reflux disease)    Irritable bowel    Insomnia    Hypothyroidism  Resolved Problems:    * No resolved hospital problems. *     PLAN:  1. Admitted to Inpatient Psychiatry for stabilization and treatment. 2.  On admission, Increased Seroquel to 400 mg nightly. Discussed alternatives at length  including other mood stabilizers. Ordered q. 15-minute checks for safety,  programming, and p.r.n. medication for anxiety, agitation, and insomnia. 8/19/2021- start lamictal 50mg BID. Goal is monotherapy with Lamictal.     8/20/201 - increase Lamictal to 75mg on Sunday. Order placed. Goal is monotherapy with Lamictal.     3.  Internal Medicine consult for admission. Hypothyroidism  - cont home dose of Synthroid 25 mcg      GERD  - cont PPI     Moderate Persistent Asthma  - w/o AE  - cont PRN Albuterol      IBS  - supportive measures     MARV  - will order home CPAP     RLS  - cont Gabapentin     Obesity   - Body mass index is 33.41 kg/m². - Counseled on weight loss. 4.   Estimated length of stay 5-8 days for stabilization. The patient is  Voluntary. [facility] DC Monday. Total time with patient was 35 minutes and more than 50 % of that time was spent counseling the patient on their symptoms, treatment, and expected goals.      Ailyn Buckley MD

## 2021-08-20 NOTE — BH NOTE
Start:  1410  End:  [address] with pt's spouse, Andre Maher. Discussed pt's progress and anticipated d/c date. Pt shared thoughts/feelings about what occurred leading up to her admission and current thought/feelings. Pt shared intrusive thoughts she had been experiencing prior to admission, regarding wanting to kill herself and take her children with her. Pt's , reported that he had suspected that from something the pt said after her father's passing; pt's  reported that he had started checking in on the patient more frequently while he was at work. Discussed ways he could support her when discharged. Pt's 's questions and concerns were addressed.     LEOPOLDO [address], LSW

## 2021-08-20 NOTE — FLOWSHEET NOTE
Purposeful Rounding    Patient Location: Patient room    Patient willing to engage in conversation: No    Presentation/behavior:  Patient resting quietly in bed with eyes closed. Affect:  Patient resting quietly in bed with eyes closed.     Concerns reported: None    PRN medications given: No    Environmental assessment: No safety hazards noted    Fall prevention interventions in place: Yellow non-skid socks on    Daily Haroldo Fall Risk Score: 35    Daily Torres Fall Risk Score: Low risk

## 2021-08-20 NOTE — GROUP NOTE
Group Therapy Note    Date: 8/19/2021    Group Start Time: 2020  Group End Time: 2110  Group Topic: Wrap-Up    [facility]        Group Therapy Note    Attendees: goals and importance of goal setting discussed. Night time milieu activities discussed. Patient's Goal:  Go to groups, be more social    Notes:  Successful     Status After Intervention:  Improved    Participation Level:  Active Listener and Interactive    Participation Quality: Appropriate, Attentive and Sharing      Speech:  normal      Thought Process/Content: Logical  Linear      Affective Functioning: Congruent      Mood: euthymic      Level of consciousness:  Alert and Oriented x4      Response to Learning: Progressing to goal      Endings: None Reported    Modes of Intervention: Support      Discipline Responsible: Behavorial Health Tech      Signature:  Jessica Batres

## 2021-08-20 NOTE — PLAN OF CARE
Pt A&O, visible attends groups and community meeting, social with peers attending groups and community meeting, watching tv, no distress noted at this time.

## 2021-08-20 NOTE — PLAN OF CARE
Patient alert and oriented x 3. Patient rates Depression 7/10 and Anxiety 6/10. Patient visible and social with peers. Patient denies SI/HI/A/V/H. Patient contracted for safety. Patient attended and participated in wrap up group. Patient took HS medications. Patient took a shower this evening. Patient denies self harm. No c/o's voiced at present.

## 2021-08-20 NOTE — GROUP NOTE
Group Therapy Note    Date: 8/20/2021    Group Start Time: 1000  Group End Time: 1050  Group Topic: Psychotherapy    [address] [facility], LEOPOLDO, LICDC, LSW      Group Therapy Note    Attendees: 8         Patient's Goal:  Explore/process stressors/relationship dynamics and lean/discuss healthy coping/communicatio strategies. Notes:  Pt actively participated in group discussion and provided feedback to peers as he/she shared. Status After Intervention:  Improved    Participation Level:  Active Listener and Interactive    Participation Quality: Appropriate, Attentive, Sharing and Supportive      Speech:  normal      Thought Process/Content: Logical      Affective Functioning: Congruent      Mood: euthymic      Level of consciousness:  Alert, Oriented x4 and Attentive      Response to Learning: Able to verbalize current knowledge/experience, Able to verbalize/acknowledge new learning, Able to retain information, Capable of insight, Able to change behavior and Progressing to goal      Endings: None Reported    Modes of Intervention: Education, Support, Socialization, Exploration, Clarifying, Problem-solving, Confrontation, Limit-setting and Reality-testing      Discipline Responsible: /Counselor      Signature:  LEOPOLDO [address]

## 2021-08-21 PROCEDURE — 1240000000 HC EMOTIONAL WELLNESS R&B

## 2021-08-21 PROCEDURE — 99232 SBSQ HOSP IP/OBS MODERATE 35: CPT | Performed by: PSYCHIATRY & NEUROLOGY

## 2021-08-21 PROCEDURE — 6370000000 HC RX 637 (ALT 250 FOR IP): Performed by: PSYCHIATRY & NEUROLOGY

## 2021-08-21 RX ORDER — LAMOTRIGINE 25 MG/1
50 TABLET ORAL ONCE
Status: COMPLETED | OUTPATIENT
Start: 2021-08-21 | End: 2021-08-21

## 2021-08-21 RX ORDER — LAMOTRIGINE 25 MG/1
75 TABLET ORAL
Status: DISCONTINUED | OUTPATIENT
Start: 2021-08-21 | End: 2021-08-21

## 2021-08-21 RX ORDER — LAMOTRIGINE 25 MG/1
75 TABLET ORAL
Status: DISCONTINUED | OUTPATIENT
Start: 2021-08-22 | End: 2021-08-23

## 2021-08-21 RX ADMIN — LAMOTRIGINE 50 MG: 25 TABLET ORAL at 17:50

## 2021-08-21 RX ADMIN — LEVOTHYROXINE SODIUM 25 MCG: 25 TABLET ORAL at 21:34

## 2021-08-21 RX ADMIN — GABAPENTIN 300 MG: 300 CAPSULE ORAL at 21:34

## 2021-08-21 RX ADMIN — Medication 10 MG: at 21:34

## 2021-08-21 RX ADMIN — PANTOPRAZOLE SODIUM 40 MG: 40 TABLET, DELAYED RELEASE ORAL at 15:04

## 2021-08-21 RX ADMIN — TRAZODONE HYDROCHLORIDE 50 MG: 50 TABLET ORAL at 21:34

## 2021-08-21 RX ADMIN — QUETIAPINE FUMARATE 400 MG: 200 TABLET, EXTENDED RELEASE ORAL at 21:34

## 2021-08-21 RX ADMIN — PANTOPRAZOLE SODIUM 40 MG: 40 TABLET, DELAYED RELEASE ORAL at 21:34

## 2021-08-21 RX ADMIN — LAMOTRIGINE 50 MG: 25 TABLET ORAL at 09:01

## 2021-08-21 NOTE — BH NOTE
Time: 2100      Type of Group: wrap up group      Level of Participation: full participation      Comments: Daily goal met

## 2021-08-21 NOTE — PROGRESS NOTES
Department of Psychiatry  Progress Note    Patient's chart was reviewed. Discussed with treatment team. Met with patient. SUBJECTIVE:      Patient has been calm and cooperative with care according to charting. Participates well in group activities. On interview today patient reports that she is feeling \"a lot better. \" She indicates that prior to coming to the hospital she would have rated both her severity of depression and anxiety as 10/10. Presently she feels that her depression is a 4/10 and her anxiety as a 5/10. Her only concern at this time is possible activation at nighttime related to her evening dose of lamotrigine. Specifically, she reports that since increasing the dose she has been waking up several times throughout the night which is not a typical issue for her at home. She asked if it was possible for the evening dose of the medication to be moved up.     ROS:   Patient has new complaints: no  Sleeping adequately:  Middle insomnia  Appetite adequate: Yes  Engaged in programming: Yes    OBJECTIVE:  VITALS:  BP (!) 142/91   Pulse 98   Temp 97.1 °F (36.2 °C) (Temporal)   Resp 18   Ht 5' 6\" (1.676 m)   Wt 207 lb (93.9 kg)   SpO2 96%   BMI 33.41 kg/m²     Mental Status Examination:    Appearance: fair grooming and hygiene  Behavior/Attitude toward examiner:  cooperative, attentive and fair eye contact  Speech: Normal rate, volume, amount  Mood:  \"a lot better\"  Affect: congruent, euthymic  Thought processes:  Goal directed, linear, no FERCHO or gross disorganization  Thought Content: today denies SI, no HI, no delusions voiced, no obsessions  Perceptions: no AVH  Attention: attention span and concentration were intact to interview   Abstraction: intact  Cognition:  Alert and oriented to person, place, time, and situation, recall intact  Insight: fair  Judgment: improving      Medication:  Scheduled:   [START ON 8/22/2021] lamoTRIgine  75 mg Oral 2 times per day    gabapentin  300 mg Oral QPM 33.41 kg/m². - Counseled on weight loss. 4.   Estimated length of stay 5-8 days for stabilization. The patient is  Voluntary. [facility] DC Monday. Total time with patient was 25 minutes and more than 50 % of that time was spent counseling the patient on their symptoms, treatment, and expected goals.      Melissa Goodpasture, MD  Staff Psychiatrist

## 2021-08-21 NOTE — PLAN OF CARE
Patient alert and oriented x 3. Patient rates Depression 5/10 and Anxiety 5/10. Patient visible and social on the milieu. Patient denies SI/HI/A/V/H. Patient took HS medications. Patient ate HS snack. Patient denies self harm. No c/o's voiced at present.

## 2021-08-21 NOTE — FLOWSHEET NOTE
Purposeful Rounding    Patient Location: Patient room    Patient willing to engage in conversation: No    Presentation/behavior:  Patient resting quietly in bed with eyes closed. Affect:  Patient resting quietly in bed with eyes closed.     Concerns reported: None    PRN medications given: No    Environmental assessment: No safety hazards noted    Fall prevention interventions in place: Yellow non-skid socks on    Daily Haroldo Fall Risk Score: 45    Daily Torres Fall Risk Score: Low risk

## 2021-08-21 NOTE — FLOWSHEET NOTE
Purposeful Rounding    Patient Location: Day room    Patient willing to engage in conversation: Yes    Presentation/behavior: Anxious    Affect: Anxious    Concerns reported: yes    PRN medications given: no    Environmental assessment: No safety hazards noted    Daily [place] Fall Risk Score: 45    Daily Torres Fall Risk Score: 0

## 2021-08-21 NOTE — FLOWSHEET NOTE
Purposeful Rounding    Patient Location: Day room    Patient willing to engage in conversation: Yes    Presentation/behavior: Anxious    Affect: Anxious    Concerns reported: yes    PRN medications given: no    Environmental assessment: No safety hazards noted    Daily [place] Fall Risk Score: 45    Daily Torres Fall Risk Score: 0

## 2021-08-21 NOTE — GROUP NOTE
Group Therapy Note    Date: 8/21/2021    Group Start Time: 0100  Group End Time: 0200  Group Topic: Cognitive Skills    [id number] [facility] SARIKA Resendiz        Group Therapy Note    Attendees: 10     Patient's Goal:  Patient will increase understanding of setting healthy boundaries    Notes:  Patients were provided with handouts about codependency and setting healthy boundaries. Bernardo Hayes actively participated in the discussion and shared that she learned that she was codependent on her mother and felt responsible for her feelings. She discussed how she can become more assertive and how this will increase feelings of self respect. Status After Intervention:  Improved    Participation Level:  Active Listener and Interactive    Participation Quality: Appropriate, Attentive and Sharing      Speech:  normal      Thought Process/Content: Logical      Affective Functioning: Congruent      Mood: anxious      Level of consciousness:  Alert, attentive, sharing      Response to Learning: Able to verbalize current knowledge/experience, Capable of insight, Able to change behavior and Progressing to goal      Endings: None Reported    Modes of Intervention: Education      Discipline Responsible: /Counselor      Signature:  SARIKA Martin

## 2021-08-21 NOTE — FLOWSHEET NOTE
Purposeful Rounding    Patient Location: [facility]    Patient willing to engage in conversation: Yes    Presentation/behavior: Cooperative and Pleasant    Affect: Brightens with interaction    Concerns reported: None    PRN medications given: No    Environmental assessment: No safety hazards noted    Fall prevention interventions in place: Yellow non-skid socks on    Daily [place] Fall Risk Score: 45    Daily Torres Fall Risk Score: Low risk

## 2021-08-22 PROCEDURE — 6370000000 HC RX 637 (ALT 250 FOR IP): Performed by: PSYCHIATRY & NEUROLOGY

## 2021-08-22 PROCEDURE — 1240000000 HC EMOTIONAL WELLNESS R&B

## 2021-08-22 RX ADMIN — LEVOTHYROXINE SODIUM 25 MCG: 25 TABLET ORAL at 21:46

## 2021-08-22 RX ADMIN — LAMOTRIGINE 75 MG: 25 TABLET ORAL at 18:35

## 2021-08-22 RX ADMIN — Medication 10 MG: at 21:46

## 2021-08-22 RX ADMIN — ACETAMINOPHEN 650 MG: 325 TABLET ORAL at 21:46

## 2021-08-22 RX ADMIN — LAMOTRIGINE 75 MG: 25 TABLET ORAL at 08:16

## 2021-08-22 RX ADMIN — GABAPENTIN 300 MG: 300 CAPSULE ORAL at 21:46

## 2021-08-22 RX ADMIN — QUETIAPINE FUMARATE 400 MG: 200 TABLET, EXTENDED RELEASE ORAL at 21:46

## 2021-08-22 RX ADMIN — HYDROXYZINE PAMOATE 50 MG: 50 CAPSULE ORAL at 08:16

## 2021-08-22 RX ADMIN — PANTOPRAZOLE SODIUM 40 MG: 40 TABLET, DELAYED RELEASE ORAL at 21:46

## 2021-08-22 RX ADMIN — TRAZODONE HYDROCHLORIDE 50 MG: 50 TABLET ORAL at 21:46

## 2021-08-22 ASSESSMENT — PAIN SCALES - GENERAL: PAINLEVEL_OUTOF10: 4

## 2021-08-22 NOTE — PLAN OF CARE
Problem: Anxiety:  Goal: Level of anxiety will decrease  Description: Level of anxiety will decrease  Outcome: Ongoing  C/O anxiety; nightmares of ex-, Given Vistaril, as ordered.

## 2021-08-22 NOTE — GROUP NOTE
Group Therapy Note    Date: 8/22/2021    Group Start Time: 1615  Group End Time: 5334  Group Topic: Healthy Living/Wellness    [address], RN        Group Therapy Note    Attendees: 13         Patient's Goal:  To turn negative words into positive when doing self-talk    Notes:  Patient was an active participant     Status After Intervention:  Improved    Participation Level: Active Listener and Interactive    Participation Quality: Appropriate, Attentive, Sharing and Supportive      Speech:  normal      Thought Process/Content: Logical      Affective Functioning: Congruent      Mood: anxious      Level of consciousness:  Alert and Attentive      Response to Learning: Able to verbalize current knowledge/experience, Able to verbalize/acknowledge new learning and Able to retain information      Endings: None Reported    Modes of Intervention: Activity      Discipline Responsible: Registered Nurse      Signature:   John Cabrera RN

## 2021-08-22 NOTE — GROUP NOTE
Group Therapy Note    Date: 8/22/2021    Group Start Time: 1:00 PM  Group End Time: 2:00 PM  Group Topic: Recreational    [facility]        Group Therapy Note    Attendees: 10       Patient's Goal: Pt will paint their safe space. Notes: Pt met goal.     Status After Intervention:  Improved    Participation Level:  Active Listener and Interactive    Participation Quality: Appropriate, Attentive, Sharing and Supportive      Speech:  normal      Thought Process/Content: Logical  Linear      Affective Functioning: Congruent      Mood: euthymic      Level of consciousness:  Alert, Oriented x4 and Attentive      Response to Learning: Able to verbalize current knowledge/experience, Able to verbalize/acknowledge new learning and Progressing to goal      Endings: None Reported    Modes of Intervention: Activity      Discipline Responsible: /Counselor      Signature:  CANDELARIA Hernadez

## 2021-08-22 NOTE — PROGRESS NOTES
Comments: + interactions, + contribution, and + engagement.         Time: 2926-7653      Type of Group: Wrap-up/Relaxation      Level of Participation: 13/15

## 2021-08-22 NOTE — PROGRESS NOTES
Purposeful Rounding    Patient Location: Patient room    Patient willing to engage in conversation: Yes    Presentation/behavior: Cooperative and Pleasant    Affect: Neutral/Euthymic(normal)    Concerns reported: No concerns reported at this time. PRN medications given: Trazodone given per order.     Environmental assessment: Room free from clutter, Clear path to bathroom , Adequate lighting and No safety hazards noted    Fall prevention interventions in place: Yellow non-skid socks on    Daily Haroldo Fall Risk Score: 57    Daily Torres Fall Risk Score: 15

## 2021-08-23 ENCOUNTER — TELEPHONE (OUTPATIENT)
Dept: FAMILY MEDICINE CLINIC | Age: 31
End: 2021-08-23

## 2021-08-23 VITALS
HEART RATE: 98 BPM | OXYGEN SATURATION: 98 % | SYSTOLIC BLOOD PRESSURE: 111 MMHG | WEIGHT: 207 LBS | TEMPERATURE: 97.5 F | DIASTOLIC BLOOD PRESSURE: 84 MMHG | RESPIRATION RATE: 16 BRPM | HEIGHT: 66 IN | BODY MASS INDEX: 33.27 KG/M2

## 2021-08-23 DIAGNOSIS — F32.A DEPRESSION WITH SUICIDAL IDEATION: Primary | ICD-10-CM

## 2021-08-23 DIAGNOSIS — R45.851 DEPRESSION WITH SUICIDAL IDEATION: Primary | ICD-10-CM

## 2021-08-23 PROCEDURE — 5130000000 HC BRIDGE APPOINTMENT

## 2021-08-23 PROCEDURE — 99239 HOSP IP/OBS DSCHRG MGMT >30: CPT | Performed by: PSYCHIATRY & NEUROLOGY

## 2021-08-23 PROCEDURE — 6370000000 HC RX 637 (ALT 250 FOR IP): Performed by: PSYCHIATRY & NEUROLOGY

## 2021-08-23 RX ORDER — LAMOTRIGINE 100 MG/1
200 TABLET ORAL DAILY
Status: DISCONTINUED | OUTPATIENT
Start: 2021-08-24 | End: 2021-08-23 | Stop reason: HOSPADM

## 2021-08-23 RX ORDER — LAMOTRIGINE 25 MG/1
75 TABLET ORAL ONCE
Status: COMPLETED | OUTPATIENT
Start: 2021-08-23 | End: 2021-08-23

## 2021-08-23 RX ORDER — LAMOTRIGINE 200 MG/1
200 TABLET ORAL DAILY
Qty: 30 TABLET | Refills: 0 | Status: SHIPPED | OUTPATIENT
Start: 2021-08-24 | End: 2021-09-16 | Stop reason: SDUPTHER

## 2021-08-23 RX ORDER — QUETIAPINE 400 MG/1
400 TABLET, FILM COATED, EXTENDED RELEASE ORAL NIGHTLY
Qty: 30 TABLET | Refills: 0 | Status: SHIPPED | OUTPATIENT
Start: 2021-08-23 | End: 2021-09-01 | Stop reason: SDUPTHER

## 2021-08-23 RX ADMIN — LAMOTRIGINE 75 MG: 25 TABLET ORAL at 12:39

## 2021-08-23 RX ADMIN — LAMOTRIGINE 75 MG: 25 TABLET ORAL at 08:21

## 2021-08-23 NOTE — GROUP NOTE
Group Therapy Note    Date: 8/23/2021    Group Start Time: 1100  Group End Time: 5103  Group Topic: Art Therapy     [address]        Group Therapy Note    Attendees: 13    Group members were instructed to create a drawing of a boundary. Group members were then encouraged to process their drawings as a group; discussing interpersonal boundary perceptions, challenges and needs. Notes:  Nereyda Campbell was engaged in the arts psychotherapy process. She was able to create a drawing, share her description of the drawing and process the drawing with fellow group members. She offered feedback to group members in support and was insightful in her input and reflection. Status After Intervention:  Improved    Participation Level:  Active Listener and Interactive    Participation Quality: Appropriate, Attentive, Sharing and Supportive      Speech:  normal      Thought Process/Content: Logical  Linear      Affective Functioning: Congruent      Mood: euthymic      Level of consciousness:  Alert, Oriented x4 and Attentive      Response to Learning: Able to verbalize current knowledge/experience, Able to verbalize/acknowledge new learning, Able to retain information, Capable of insight and Progressing to goal      Endings: None Reported    Modes of Intervention: Exploration, Clarifying and Activity      Discipline Responsible: Psychoeducational Specialist      Signature:  Marcus Mercado MS, ATR-BC

## 2021-08-23 NOTE — GROUP NOTE
Group Therapy Note    Date: 8/22/2021    Group Start Time: 2030  Group End Time: 2110  Group Topic: Wrap-Up    [facility]        Group Therapy Note    Attendees: Goals and importance of goal setting discussed. Night time milieu activities discussed. Patient's Goal:  To not take responsibility for everything    Notes:  Successful     Status After Intervention:  Improved    Participation Level:  Active Listener and Interactive    Participation Quality: Appropriate and Attentive      Speech:  normal      Thought Process/Content: Logical  Linear      Affective Functioning: Congruent      Mood: euthymic      Level of consciousness:  Alert and Oriented x4      Response to Learning: Progressing to goal      Endings: None Reported    Modes of Intervention: Support      Discipline Responsible: Behavorial Health Tech      Signature:  Jac Thacker

## 2021-08-23 NOTE — PROGRESS NOTES
Purposeful Rounding    Patient Location: Patient room    Patient willing to engage in conversation: Yes    Presentation/behavior: Cooperative and Pleasant    Affect: Neutral/Euthymic(normal)    Concerns reported: No concerns reported. PRN medications given: Trazodone given per order.     Environmental assessment: Room free from clutter, Clear path to bathroom , Adequate lighting and No safety hazards noted    Fall prevention interventions in place: Yellow non-skid socks on    Daily Haroldo Fall Risk Score: 65    Daily Torres Fall Risk Score: 15

## 2021-08-23 NOTE — TELEPHONE ENCOUNTER
Pt called in wants a appointment with a physiatrist    Please advise   Call [phone]    OPAL a nurse called in earlier to schedule a HFU which I scheduled the pt called back and cancelled said she just had a appointment

## 2021-08-23 NOTE — FLOWSHEET NOTE
08/23/21 0904   Status and Exam   Normal Yes   Facial Expression Brightened   Affect Normal   Level of Consciousness Alert   Mood:Normal Yes   Motor Activity:Normal Yes   Interview Behavior Cooperative   Preception [place] to Person;[place] to Time;[place] to Place;[place] to Situation   Attention:Normal Yes   Thought Processes   (WDL)   Thought Content:Normal Yes   Hallucinations None   Delusions No   Memory:Normal Yes   Insight and Judgment No   Insight and Judgment Poor Judgment   Present Suicidal Ideation No   Present Homicidal Ideation No

## 2021-08-23 NOTE — BH NOTE
Purposeful Rounding    Patient Location: Day room    Patient willing to engage in conversation: Yes    Presentation/behavior: Cooperative and Pleasant    Affect: Neutral/Euthymic(normal)    Concerns reported: None    PRN medications given:  None    Environmental assessment: No safety hazards noted    Fall prevention interventions in place: Low risk

## 2021-08-23 NOTE — SUICIDE SAFETY PLAN
SAFETY PLAN    A suicide Safety Plan is a document that supports someone when they are having thoughts of suicide. Warning Signs that indicate a suicidal crisis may be developing: What (situations, thoughts, feelings, body sensations, behaviors, etc.) do you experience that lets you know you are beginning to think about suicide? 1. isolation  2. Sad music  3. moodiness    Internal Coping Strategies:  What things can I do (relaxation techniques, hobbies, physical activities, etc.) to take my mind off my problems without contacting another person? 1. sensory  2. Deep breathing  3. stretch    People and social settings that provide distraction: Who can I call or where can I go to distract me? 1. Name: Concha Hong    2. Name: Mom, my kids, my porch/beroom       People whom I can ask for help: Who can I call when I need help - for example, friends, family, clergy, someone else? 1. Name: Concha Hong                Phone: [phone]  2. Name: Collins/Michaela    3. Name: Fabián Ashraf or [address] [place] [address] I can contact during a crisis: Who can I call for help - for example, my doctor, my psychiatrist, my psychologist, a mental health provider, a suicide hotline? 1. Clinician Name: [address] [place] [address]   Address: [address] [place], [facility]      Phone  #: [phone]    2. Suicide Prevention Lifeline: 3-087-213-TALK (1818)     Making the environment safe: How can I make my environment (house/apartment/living space) safer? For example, can I remove guns, medications, and other items? 1. Make schedule  2.  Prayer, monitor [employer]    Something that is important to me and worth living for is: my kids, Zoe Cordero, my mom

## 2021-08-24 ENCOUNTER — TELEPHONE (OUTPATIENT)
Dept: FAMILY MEDICINE CLINIC | Age: 31
End: 2021-08-24

## 2021-08-24 NOTE — TELEPHONE ENCOUNTER
Set patient up for a new patient appt with Gaetano Gabriel for [phone]. However pt does not have a car and would like to do this virtual if possible. The only days she has a car is on Wednesday.   Pt only has a month to get in with someone so she can get medication refill and weaned off another medication she was put on in the hospital.  Please advise if it is ok for her to have a vv on a Tuesday or if we need to switch her appointment to a different day    Thank you

## 2021-08-26 DIAGNOSIS — K21.9 GASTROESOPHAGEAL REFLUX DISEASE WITHOUT ESOPHAGITIS: ICD-10-CM

## 2021-08-26 RX ORDER — OMEPRAZOLE 40 MG/1
CAPSULE, DELAYED RELEASE ORAL
Qty: 30 CAPSULE | Refills: 4 | OUTPATIENT
Start: 2021-08-26 | End: 2021-11-11

## 2021-09-01 ENCOUNTER — TELEPHONE (OUTPATIENT)
Dept: FAMILY MEDICINE CLINIC | Age: 31
End: 2021-09-01

## 2021-09-01 ENCOUNTER — OFFICE VISIT (OUTPATIENT)
Dept: BEHAVIORAL/MENTAL HEALTH CLINIC | Age: 31
End: 2021-09-01
Payer: COMMERCIAL

## 2021-09-01 DIAGNOSIS — F32.A DEPRESSIVE DISORDER: Primary | ICD-10-CM

## 2021-09-01 DIAGNOSIS — F33.1 MODERATE EPISODE OF RECURRENT MAJOR DEPRESSIVE DISORDER (HCC): Primary | ICD-10-CM

## 2021-09-01 PROCEDURE — 90837 PSYTX W PT 60 MINUTES: CPT | Performed by: FAMILY MEDICINE

## 2021-09-01 PROCEDURE — 1036F TOBACCO NON-USER: CPT | Performed by: FAMILY MEDICINE

## 2021-09-01 RX ORDER — QUETIAPINE FUMARATE 50 MG/1
TABLET, EXTENDED RELEASE ORAL
Qty: 49 TABLET | Refills: 0 | Status: SHIPPED | OUTPATIENT
Start: 2021-09-01 | End: 2021-10-01

## 2021-09-01 RX ORDER — LAMOTRIGINE 25 MG/1
25 TABLET, EXTENDED RELEASE ORAL DAILY
Qty: 49 TABLET | Refills: 0 | Status: SHIPPED | OUTPATIENT
Start: 2021-09-01 | End: 2021-09-27

## 2021-09-01 RX ORDER — QUETIAPINE 150 MG/1
TABLET, FILM COATED, EXTENDED RELEASE ORAL
Qty: 49 TABLET | Refills: 0 | Status: SHIPPED | OUTPATIENT
Start: 2021-09-01 | End: 2021-10-01

## 2021-09-01 NOTE — TELEPHONE ENCOUNTER
Pt spouse come  to see Dr Sofie Diamond at noon the pt is scheduled to see Vesna Ospina first available end of October the pt needs medication until she estblished with HEATH Lopez   Pt stated she is trying to get off the seroquel the pt is wanting to be on the labetalol 300 MG she is hoping Dr Sofie Diamond can prescribe it until then       Please advise
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
## 2021-09-02 NOTE — TELEPHONE ENCOUNTER
See me in about 2-3 weeks. Rx sent in for below.     Week  1- lamictal 225 + seroquel 350 (150+150+50)  2- lamictal 250 + seroquel 300 (150 + 150)  3- lamictal 275 + seroquel 250 (150 + 50 + 50)  4- lamictal 300 + seroquel 200 (150 + 50)  5- lamictal 300 + seroquel 150 (150)  6- lamictal 300 + seroquel 100 (50 +50)  7- lamictal 300 + seroquel 50 (50)  8- lamictal 300 + seroquel 0    Please send to pt via Loc[employer] as well

## 2021-09-15 ENCOUNTER — TELEPHONE (OUTPATIENT)
Dept: FAMILY MEDICINE CLINIC | Age: 31
End: 2021-09-15

## 2021-09-15 DIAGNOSIS — J45.31 MILD PERSISTENT ASTHMA WITH ACUTE EXACERBATION: ICD-10-CM

## 2021-09-15 RX ORDER — ALBUTEROL SULFATE 90 UG/1
2 AEROSOL, METERED RESPIRATORY (INHALATION) DAILY PRN
Qty: 1 EACH | Refills: 1 | Status: SHIPPED | OUTPATIENT
Start: 2021-09-15 | End: 2022-03-30 | Stop reason: SDUPTHER

## 2021-09-15 NOTE — TELEPHONE ENCOUNTER
200 mg of lamictal she says she is doing ok on that, and taking 250 on seroquel. She is seeing someone at the [facility].

## 2021-09-15 NOTE — TELEPHONE ENCOUNTER
Pt had appt today that provider needs to r/s. Pt calling stating she was seeing Dr. Brooke Alvarenga today just to refill medications for lamictal 200mg and albuteral inhaler. Stated that she has an appt with Dr. Brooke Alvarenga on 10.14.2021 for a follow up if we could go ahead and fill these medications. Stated she would also like to change her visit on 10.14.2021 to a virtual visit. Stated that the lamictal is being upped gradually and was not sure if it needed to be upped or needs to wait.      Pt can be reached at [phone]

## 2021-09-15 NOTE — TELEPHONE ENCOUNTER
If she is seeing a psychiatrist at the [facility], they she should discuss with them how to adjust her medication dose. If not, she can use the prior schedule.

## 2021-09-15 NOTE — TELEPHONE ENCOUNTER
Per Dr. Deepak box, wanted to see patient sooner than Oct. Please help her reschedule her appt. Per notes lamictal dose was as follows:    Week  1- lamictal 225 + seroquel 350 (150+150+50)  2- lamictal 250 + seroquel 300 (150 + 150)  3- lamictal 275 + seroquel 250 (150 + 50 + 50)  4- lamictal 300 + seroquel 200 (150 + 50)  5- lamictal 300 + seroquel 150 (150)  6- lamictal 300 + seroquel 100 (50 +50)  7- lamictal 300 + seroquel 50 (50)  8- lamictal 300 + seroquel 0    Where is she on her dose?

## 2021-09-16 ENCOUNTER — TELEMEDICINE (OUTPATIENT)
Dept: BARIATRICS/WEIGHT MGMT | Age: 31
End: 2021-09-16
Payer: COMMERCIAL

## 2021-09-16 ENCOUNTER — TELEPHONE (OUTPATIENT)
Dept: FAMILY MEDICINE CLINIC | Age: 31
End: 2021-09-16

## 2021-09-16 ENCOUNTER — PATIENT MESSAGE (OUTPATIENT)
Dept: FAMILY MEDICINE CLINIC | Age: 31
End: 2021-09-16

## 2021-09-16 ENCOUNTER — TELEPHONE (OUTPATIENT)
Dept: BARIATRICS/WEIGHT MGMT | Age: 31
End: 2021-09-16

## 2021-09-16 DIAGNOSIS — E66.9 CLASS 1 OBESITY: Primary | ICD-10-CM

## 2021-09-16 DIAGNOSIS — Z71.3 DIETARY COUNSELING AND SURVEILLANCE: ICD-10-CM

## 2021-09-16 PROCEDURE — 1111F DSCHRG MED/CURRENT MED MERGE: CPT | Performed by: FAMILY MEDICINE

## 2021-09-16 PROCEDURE — G8427 DOCREV CUR MEDS BY ELIG CLIN: HCPCS | Performed by: FAMILY MEDICINE

## 2021-09-16 PROCEDURE — 99213 OFFICE O/P EST LOW 20 MIN: CPT | Performed by: FAMILY MEDICINE

## 2021-09-16 RX ORDER — LAMOTRIGINE 200 MG/1
200 TABLET ORAL DAILY
Qty: 30 TABLET | Refills: 0 | Status: SHIPPED | OUTPATIENT
Start: 2021-09-16 | End: 2021-09-27

## 2021-09-16 ASSESSMENT — ENCOUNTER SYMPTOMS
CHOKING: 0
BLOOD IN STOOL: 0
VOMITING: 0
PHOTOPHOBIA: 0
APNEA: 0
ABDOMINAL DISTENTION: 0
SHORTNESS OF BREATH: 0
WHEEZING: 0
NAUSEA: 0
CHEST TIGHTNESS: 0
CONSTIPATION: 0
COUGH: 0
EYE PAIN: 0
ABDOMINAL PAIN: 0
DIARRHEA: 0

## 2021-09-16 NOTE — TELEPHONE ENCOUNTER
From: Araceli Matos  To: Anjelica Valle MD  Sent: 9/16/2021 8:54 AM EDT  Subject: Prescription Question    Hello. I have appointment with you in 2 weeks but I run out of my Lamictal in 4 days. Could you call me the 200 in? Thank you.

## 2021-09-16 NOTE — PROGRESS NOTES
Patient: Doris Garcia                      Encounter Date: 9/16/2021    YOB: 1990               Age: 33 y.o. Chief Complaint   Patient presents with    Weight Management     F/u MWM       Patient identification was verified at the start of the visit. Patient-Reported Vitals 8/16/2021   Patient-Reported Weight 207 lb   Patient-Reported Height 5 ft 6 in         BP Readings from Last 1 Encounters:   08/17/21 (!) 133/90       BMI Readings from Last 1 Encounters:   08/17/21 34.76 kg/m²       Pulse Readings from Last 1 Encounters:   08/17/21 81       Wt Readings from Last 3 Encounters:   08/17/21 215 lb 6.2 oz (97.7 kg)   06/16/21 214 lb 9.6 oz (97.3 kg)   06/14/21 215 lb (97.5 kg)       Self-reported weight: 207 pounds     HPI: 33 y.o. female with a long-standing history of obesity presents today for a virtual video follow-up. She has lost 2 pounds since her last visit . Current treatment includes low carb/gage diet. Food recall and assessment reviewed. Making good dietary choices. Motivated to continue losing weight. Diet: []LCHF/Ketogenic [x]Modified low-calorie/low carb diet  []Low-calorie diet          []Maintenance       []Other:         Adherent? [x]Yes- somewhat       Side effects: No           Exercise: []Cardio     []Resistance/strength training     [x]Other: No intentional exercise, but trying to be physically active     Allergies   Allergen Reactions    Latex Swelling and Rash    Kiwi Extract Shortness Of Breath    Tramadol Other (See Comments)     Hallucinations.     Zoloft [Sertraline Hcl]      overstim    Fluoxetine Nausea Only    Shellfish-Derived Products Swelling and Rash    Wellbutrin [Bupropion Hcl] Nausea Only         Current Outpatient Medications:     lamoTRIgine (LAMICTAL) 200 MG tablet, Take 1 tablet by mouth daily, Disp: 30 tablet, Rfl: 0    albuterol sulfate HFA (VENTOLIN HFA) 108 (90 Base) MCG/ACT inhaler, Inhale 2 puffs into the lungs daily as needed for Wheezing or Shortness of Breath, Disp: 1 each, Rfl: 1    QUEtiapine (SEROQUEL XR) 150 MG TB24 extended release tablet, Taper off as directed, Disp: 49 tablet, Rfl: 0    QUEtiapine (SEROQUEL XR) 50 MG extended release tablet, Taper off as directed, Disp: 49 tablet, Rfl: 0    lamoTRIgine (LAMICTAL XR) 25 MG TB24 extended release tablet, Take 1 tablet by mouth daily With 200 mg: take 1 x 7d, 2 x 7d, 3 x 7d then switch to 300, Disp: 49 tablet, Rfl: 0    omeprazole (PRILOSEC) 40 MG delayed release capsule, TAKE ONE CAPSULE BY MOUTH DAILY, Disp: 30 capsule, Rfl: 4    gabapentin (NEURONTIN) 100 MG capsule, TAKE 1-3 CAPSULES BY MOUTH ONCE NIGHTLY, Disp: 90 capsule, Rfl: 2    levothyroxine (SYNTHROID) 25 MCG tablet, TAKE ONE TABLET BY MOUTH DAILY, Disp: 30 tablet, Rfl: 5    dicyclomine (BENTYL) 20 MG tablet, TAKE ONE TABLET BY MOUTH FOUR TIMES A DAY, Disp: 120 tablet, Rfl: 2    albuterol (PROVENTIL) (2.5 MG/3ML) 0.083% nebulizer solution, Take 3 mLs by nebulization every 6 hours as needed for Wheezing, Disp: 50 vial, Rfl: 1    Cholecalciferol (VITAMIN D3) 250 MCG (04271 UT) CAPS, Take 1 capsule by mouth daily, Disp: 100 capsule, Rfl: 3    melatonin 3 MG TABS tablet, Take 3 mg by mouth daily, Disp: , Rfl:     Patient Active Problem List   Diagnosis    GERD (gastroesophageal reflux disease)    Contraceptive management    Irritable bowel    Insomnia    Back pain, chronic    ADHD (attention deficit hyperactivity disorder)    Allergic rhinitis    Needs flu shot    Dysmenorrhea    Menorrhagia    PMS (premenstrual syndrome)    Anxiety    Tinea versicolor    Atypical depression    Herpes stomatitis    Hypothyroidism    Moderate persistent asthma without complication    Prediabetes    Moderate episode of recurrent major depressive disorder (HCC)    MARV (obstructive sleep apnea)    PLMD (periodic limb movement disorder)    Vitamin D deficiency    Obstructive sleep apnea, adult    Chronic GERD    Bipolar I disorder, most recent episode (or current) depressed ([facility] [address]       Review of Systems   Constitutional: Negative for fatigue. Eyes: Negative for photophobia, pain and visual disturbance. Respiratory: Negative for apnea, cough, choking, chest tightness, shortness of breath and wheezing. Cardiovascular: Negative for chest pain, palpitations and leg swelling. Gastrointestinal: Negative for abdominal distention, abdominal pain, blood in stool, constipation, diarrhea, nausea and vomiting. Endocrine: Negative for cold intolerance and heat intolerance. Musculoskeletal: Negative for arthralgias and myalgias. Skin: Negative for rash. Neurological: Negative for dizziness, tremors, syncope, weakness, numbness and headaches. Psychiatric/Behavioral: Negative for agitation, confusion, decreased concentration, dysphoric mood, hallucinations, sleep disturbance and suicidal ideas. The patient is not nervous/anxious and is not hyperactive. Physical Exam  Constitutional:       Appearance: She is well-developed. HENT:      Head: Normocephalic. Eyes:      Conjunctiva/sclera: Conjunctivae normal.   Abdominal:      General: Abdomen is protuberant. Musculoskeletal:         General: No swelling. Neurological:      Mental Status: She is alert and oriented to person, place, and time. Psychiatric:         Mood and Affect: Mood normal.         Behavior: Behavior normal.         Thought Content:  Thought content normal.         Judgment: Judgment normal.         Admission on 08/17/2021, Discharged on 08/17/2021   Component Date Value Ref Range Status    WBC 08/17/2021 6.9  4.0 - 11.0 K/uL Final    RBC 08/17/2021 5.14  4.00 - 5.20 M/uL Final    Hemoglobin 08/17/2021 14.9  12.0 - 16.0 g/dL Final    Hematocrit 08/17/2021 44.0  36.0 - 48.0 % Final    MCV 08/17/2021 85.6  80.0 - 100.0 fL Final    MCH 08/17/2021 29.1  26.0 - 34.0 pg Final    MCHC 08/17/2021 33.9  31.0 - 36.0 g/dL Final    RDW 08/17/2021 13.3  12.4 - 15.4 % Final    Platelets 92/14/1195 233  135 - 450 K/uL Final    MPV 08/17/2021 9.0  5.0 - 10.5 fL Final    Neutrophils % 08/17/2021 60.7  % Final    Lymphocytes % 08/17/2021 30.3  % Final    Monocytes % 08/17/2021 7.1  % Final    Eosinophils % 08/17/2021 1.3  % Final    Basophils % 08/17/2021 0.6  % Final    Neutrophils Absolute 08/17/2021 4.2  1.7 - 7.7 K/uL Final    Lymphocytes Absolute 08/17/2021 2.1  1.0 - 5.1 K/uL Final    Monocytes Absolute 08/17/2021 0.5  0.0 - 1.3 K/uL Final    Eosinophils Absolute 08/17/2021 0.1  0.0 - 0.6 K/uL Final    Basophils Absolute 08/17/2021 0.0  0.0 - 0.2 K/uL Final    Sodium 08/17/2021 139  136 - 145 mmol/L Final    Potassium 08/17/2021 4.0  3.5 - 5.1 mmol/L Final    Chloride 08/17/2021 101  99 - 110 mmol/L Final    CO2 08/17/2021 25  21 - 32 mmol/L Final    Anion Gap 08/17/2021 13  3 - 16 Final    Glucose 08/17/2021 122* 70 - 99 mg/dL Final    BUN 08/17/2021 10  7 - 20 mg/dL Final    CREATININE 08/17/2021 0.7  0.6 - 1.1 mg/dL Final    GFR Non- 08/17/2021 >60  >60 Final    Comment: >60 mL/min/1.73m2 EGFR, calc. for ages 25 and older using the  MDRD formula (not corrected for weight), is valid for stable  renal function.  GFR  08/17/2021 >60  >60 Final    Comment: Chronic Kidney Disease: less than 60 ml/min/1.73 sq.m. Kidney Failure: less than 15 ml/min/1.73 sq.m. Results valid for patients 18 years and older.       Calcium 08/17/2021 9.5  8.3 - 10.6 mg/dL Final    Total Protein 08/17/2021 7.7  6.4 - 8.2 g/dL Final    Albumin 08/17/2021 4.5  3.4 - 5.0 g/dL Final    Albumin/Globulin Ratio 08/17/2021 1.4  1.1 - 2.2 Final    Total Bilirubin 08/17/2021 <0.2  0.0 - 1.0 mg/dL Final    Alkaline Phosphatase 08/17/2021 99  40 - 129 U/L Final    ALT 08/17/2021 24  10 - 40 U/L Final    AST 08/17/2021 18  15 - 37 U/L Final    Globulin 08/17/2021 3.2  g/dL Final    Ethanol Lvl 08/17/2021 None Detected  mg/dL Final    Comment:    None Detected  Conversion factor:  100 mg/dl = .100 g/dl  For Medical Purposes Only      Amphetamine Screen, Urine 08/17/2021 Neg  Negative <1000ng/mL Final    Barbiturate Screen, Ur 08/17/2021 Neg  Negative <200 ng/mL Final    Benzodiazepine Screen, Urine 08/17/2021 Neg  Negative <200 ng/mL Final    Cannabinoid Scrn, Ur 08/17/2021 POSITIVE* Negative <50 ng/mL Final    Cocaine Metabolite Screen, Urine 08/17/2021 Neg  Negative <300 ng/mL Final    Opiate Scrn, Ur 08/17/2021 Neg  Negative <300 ng/mL Final    Comment: \"Therapeutic levels of pain medication, especially oxycontin and synthetic  opioids, may not be detected by this Methodology. Pain management screen  panel  Drug panel-PM-Hi Res Ur, Interp (PAIN) should be considered for drug  monitoring \".  PCP Screen, Urine 08/17/2021 Neg  Negative <25 ng/mL Final    Methadone Screen, Urine 08/17/2021 Neg  Negative <300 ng/mL Final    Propoxyphene Scrn, Ur 08/17/2021 Neg  Negative <300 ng/mL Final    Oxycodone Urine 08/17/2021 Neg  Negative <100 ng/ml Final    pH, UA 08/17/2021 6.0   Final    Comment: Urine pH less than 5.0 or greater than 8.0 may indicate sample adulteration. Another sample should be collected if clinically  indicated.  Drug Screen Comment: 08/17/2021 see below   Final    Comment: This method is a screening test to detect only these drug  classes as part of a medical workup. Confirmatory testing  by another method should be ordered if clinically indicated.       Acetaminophen Level 08/17/2021 <5* 10 - 30 ug/mL Final    Comment: Therapeutic Range: 10.0-30.0 ug/mL  Toxic: >=197 ug/mL      Salicylate, Serum 14/14/6006 <0.3* 15.0 - 30.0 mg/dL Final    Comment: Therapeutic Range: 15.0-30.0 mg/dL  Toxic: >30.0 mg/dL      Color, UA 08/17/2021 YELLOW  Straw/Yellow Final    Clarity, UA 08/17/2021 Clear  Clear Final    Glucose, Ur 08/17/2021 Negative  Negative mg/dL Final    Bilirubin Urine 08/17/2021 Negative  Negative Final    Ketones, Urine 08/17/2021 Negative  Negative mg/dL Final    Specific Gravity, UA 08/17/2021 1.011  1.005 - 1.030 Final    Blood, Urine 08/17/2021 Negative  Negative Final    pH, UA 08/17/2021 6.0  5.0 - 8.0 Final    Protein, UA 08/17/2021 Negative  Negative mg/dL Final    Urobilinogen, Urine 08/17/2021 0.2  <2.0 E.U./dL Final    Nitrite, Urine 08/17/2021 Negative  Negative Final    Leukocyte Esterase, Urine 08/17/2021 Negative  Negative Final    Microscopic Examination 08/17/2021 Not Indicated   Final    Urine Type 08/17/2021 NotGiven   Final    Urine Reflex to Culture 08/17/2021 Not Indicated   Final    HCG(Urine) Pregnancy Test 08/17/2021 Negative  Detects HCG level >20 MIU/mL Final    Comment: Note:  Always repeat results in question with a serum  quantitative pregnancy test. A serum hCG is positive  2-5 days before the urine hCG test.      SARS-CoV-2, NAAT 08/17/2021 Not Detected  Not Detected Final    Comment: Rapid NAAT:   Negative results should be treated as presumptive and,  if inconsistent with clinical signs and symptoms or necessary for  patient management, should be tested with an alternative molecular  assay. Negative results do not preclude SARS-CoV-2 infection and  should not be used as the sole basis for patient management decisions. This test has been authorized by the FDA under an Emergency Use  Authorization (EUA) for use by authorized laboratories.     Fact sheet for Healthcare Providers:  Estephania.gayatri  Fact sheet for Patients: Estephania.gayatri    METHODOLOGY: Isothermal Nucleic Acid Amplification      Ventricular Rate 08/17/2021 93  BPM Final    Atrial Rate 08/17/2021 93  BPM Final    P-R Interval 08/17/2021 134  ms Final    QRS Duration 08/17/2021 78  ms Final    Q-T Interval 08/17/2021 360  ms Final    QTc Calculation (Bazett) 08/17/2021 447  ms Final    P Axis 08/17/2021 48 [address] [facility] waiver authority and the [facility] Appropriations Act, this Virtual Visit was conducted with patient's (and/or legal guardian's) consent, to reduce the patient's risk of exposure to COVID-19 and provide necessary medical care. The patient (and/or legal guardian) has also been advised to contact this office for worsening conditions or problems, and seek emergency medical treatment and/or call 911 if deemed necessary. Services were provided through a video synchronous discussion virtually to substitute for in-person clinic visit. Patient and provider were located at their individual homes. --Chi Ramirez MD on 9/18/2021 at 3:07 PM    An electronic signature was used to authenticate this note.

## 2021-09-16 NOTE — TELEPHONE ENCOUNTER
Pt had a appointment on 9/15/2021   Pt has gtten pneumonia in the past   Pt has SOB chest and back hurt has a cough that has been going on for 2 months she went to urgent care she is on amoxcillen she was told she has pneumonia pt stated Dr Ida Worrell usually calls in a z kaylah     Please advise   Verified pharmacy

## 2021-09-17 NOTE — TELEPHONE ENCOUNTER
Me  to Ila Burton      9/16/21 5:19 PM  Need to be seen and evaluated Urgent care or here at office. Last read by Bobby Forrester at 5:23 PM on 9/16/2021.

## 2021-09-27 ENCOUNTER — TELEMEDICINE (OUTPATIENT)
Dept: FAMILY MEDICINE CLINIC | Age: 31
End: 2021-09-27
Payer: COMMERCIAL

## 2021-09-27 DIAGNOSIS — G25.81 RESTLESS LEGS SYNDROME (RLS): ICD-10-CM

## 2021-09-27 DIAGNOSIS — J45.41 MODERATE PERSISTENT ASTHMATIC BRONCHITIS WITH ACUTE EXACERBATION: Primary | ICD-10-CM

## 2021-09-27 DIAGNOSIS — J45.20 ASTHMA, MILD INTERMITTENT, WELL-CONTROLLED: ICD-10-CM

## 2021-09-27 PROCEDURE — G8427 DOCREV CUR MEDS BY ELIG CLIN: HCPCS | Performed by: FAMILY MEDICINE

## 2021-09-27 PROCEDURE — 99214 OFFICE O/P EST MOD 30 MIN: CPT | Performed by: FAMILY MEDICINE

## 2021-09-27 RX ORDER — ALBUTEROL SULFATE 2.5 MG/3ML
2.5 SOLUTION RESPIRATORY (INHALATION) EVERY 6 HOURS PRN
Qty: 50 EACH | Refills: 1 | Status: SHIPPED | OUTPATIENT
Start: 2021-09-27 | End: 2022-09-27

## 2021-09-27 RX ORDER — PREDNISONE 10 MG/1
TABLET ORAL
Qty: 39 TABLET | Refills: 0 | Status: SHIPPED | OUTPATIENT
Start: 2021-09-27 | End: 2021-10-09

## 2021-09-27 RX ORDER — DEXTROMETHORPHAN HYDROBROMIDE AND PROMETHAZINE HYDROCHLORIDE 15; 6.25 MG/5ML; MG/5ML
5 SYRUP ORAL 4 TIMES DAILY PRN
Qty: 240 ML | Refills: 0 | Status: SHIPPED | OUTPATIENT
Start: 2021-09-27 | End: 2021-10-04

## 2021-09-27 RX ORDER — GABAPENTIN 300 MG/1
CAPSULE ORAL
Qty: 30 CAPSULE | Refills: 3 | Status: SHIPPED | OUTPATIENT
Start: 2021-09-27 | End: 2021-10-27 | Stop reason: SDUPTHER

## 2021-09-27 RX ORDER — LAMOTRIGINE 300 MG/1
300 TABLET, EXTENDED RELEASE ORAL NIGHTLY
Qty: 30 TABLET | Refills: 1 | Status: SHIPPED | OUTPATIENT
Start: 2021-09-27 | End: 2021-12-02
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
## 2021-09-27 NOTE — PROGRESS NOTES
TELEHEALTH EVALUATION -- Audio/Visual (During ZXIOB-94 public health emergency)  VIDEO VISIT- patient and provider not at same location  Also present:none. Chief Complaint   Patient presents with    Other     discuss meds cough 2 month neg covid      1. Moderate persistent asthmatic bronchitis with acute exacerbation    2. Asthma, mild intermittent, well-controlled    3. Restless legs syndrome (RLS)      · Cough x 2 mo, had amox, then z-kaylah and steroid; gets shortness of breath; neg COVID  · Inhaler and nebulizer only temporary help. Dry cough. · Got seroquel down to 200 mg. HISTORY:  Patient's medications, allergies, past medical, and social histories were reviewed and updated as appropriate. CHART REVIEW  Health Maintenance   Topic Date Due    COVID-19 Vaccine (1) Never done    Flu vaccine (1) 09/01/2021    A1C test (Diabetic or Prediabetic)  05/26/2022    TSH testing  05/26/2022    Cervical cancer screen  06/03/2025    DTaP/Tdap/Td vaccine (8 - Td or Tdap) 08/11/2028    Pneumococcal 0-64 years Vaccine (2 of 2 - PPSV23) 07/04/2053    Hepatitis B vaccine  Completed    Hib vaccine  Completed    Hepatitis C screen  Completed    HIV screen  Completed    Hepatitis A vaccine  Aged Out    Meningococcal (ACWY) vaccine  Aged Out    Varicella vaccine  Discontinued     The ASCVD Risk score (Debra Patrick, et al., 2013) failed to calculate for the following reasons: The 2013 ASCVD risk score is only valid for ages 36 to 78  Prior to Visit Medications    Medication Sig Taking? Authorizing Provider   predniSONE (DELTASONE) 10 MG tablet Take 3 twice a day for 3 days, then 2 twice a day for 3 days, the 1 twice a day for 3 days, then 1 daily for 3 days, then STOP. Yes Sim Amador MD   promethazine-dextromethorphan (PROMETHAZINE-DM) 6.25-15 MG/5ML syrup Take 5 mLs by mouth 4 times daily as needed for Cough Watch for sedation.  Yes Sim Amador MD   albuterol (PROVENTIL) (2.5 MG/3ML) 0.083% nebulizer solution Take 3 mLs by nebulization every 6 hours as needed for Wheezing Yes Sharon Vasquez MD   lamoTRIgine ER (LAMICTAL XR) 300 MG TB24 Take 300 mg by mouth nightly Yes Sharon Vasquez MD   gabapentin (NEURONTIN) 300 MG capsule TAKE 1 CAPSULES BY MOUTH ONCE NIGHTLY Yes Sharon Vasquez MD   albuterol sulfate HFA (VENTOLIN HFA) 108 (90 Base) MCG/ACT inhaler Inhale 2 puffs into the lungs daily as needed for Wheezing or Shortness of Breath Yes YENNY Alatorre CNP   QUEtiapine (SEROQUEL XR) 150 MG TB24 extended release tablet Taper off as directed Yes Sharon Vasquez MD   QUEtiapine (SEROQUEL XR) 50 MG extended release tablet Taper off as directed Yes Sharon Vasquez MD   omeprazole (PRILOSEC) 40 MG delayed release capsule TAKE ONE CAPSULE BY MOUTH DAILY Yes Sharon Vasquez MD   levothyroxine (SYNTHROID) 25 MCG tablet TAKE ONE TABLET BY MOUTH DAILY Yes Sharon Vasquez MD   dicyclomine (BENTYL) 20 MG tablet TAKE ONE TABLET BY MOUTH FOUR TIMES A DAY Yes Sharon Vasquez MD   melatonin 3 MG TABS tablet Take 3 mg by mouth daily Yes Historical Provider, MD   Cholecalciferol (VITAMIN D3) 250 MCG (17400 UT) CAPS Take 1 capsule by mouth daily  Patient not taking: Reported on 9/27/2021  Sharon Vasquez MD      Family History   Problem Relation Age of Onset    Diabetes Mother     Alcohol Abuse Mother     Depression Mother     Arthritis Mother     Mental Illness Father         bipolar    Heart Disease Father 43        MI x 3    Hypertension Father    [facility] Elevated Lipids Father     Coronary Art Dis Father     Depression Father     COPD Father     Arthritis Father     Cancer Sister         cervical    Depression Sister     Heart Failure Maternal Grandmother     Asthma Maternal Grandmother      Social History     Tobacco Use    Smoking status: Never Smoker    Smokeless tobacco: Never Used    Tobacco comment: congratulated on nonsmoking status.    Vaping Use    Vaping Use: Never used   Substance Use Topics    Alcohol use: No     Alcohol/week: 0.0 standard drinks    Drug use: No      LAST LABS  Cholesterol, Total   Date Value Ref Range Status   05/26/2021 186 0 - 199 mg/dL Final     LDL Calculated   Date Value Ref Range Status   05/26/2021 113 (H) <100 mg/dL Final     HDL   Date Value Ref Range Status   05/26/2021 39 (L) 40 - 60 mg/dL Final     Triglycerides   Date Value Ref Range Status   05/26/2021 170 (H) 0 - 150 mg/dL Final     Lab Results   Component Value Date    GLUCOSE 122 (H) 08/17/2021     Lab Results   Component Value Date     08/17/2021    K 4.0 08/17/2021    CREATININE 0.7 08/17/2021     Lab Results   Component Value Date    WBC 6.9 08/17/2021    HGB 14.9 08/17/2021    HCT 44.0 08/17/2021    MCV 85.6 08/17/2021     08/17/2021     Lab Results   Component Value Date    ALT 24 08/17/2021    AST 18 08/17/2021    ALKPHOS 99 08/17/2021    BILITOT <0.2 08/17/2021     TSH (uIU/mL)   Date Value   05/26/2021 1.16     Lab Results   Component Value Date    LABA1C 5.8 05/26/2021      Objective:   PHYSICAL EXAM:  Vitals (if available)    BP Readings from Last 3 Encounters:   08/17/21 (!) 133/90   06/16/21 128/86   06/14/21 133/87     Wt Readings from Last 3 Encounters:   08/17/21 215 lb 6.2 oz (97.7 kg)   06/16/21 214 lb 9.6 oz (97.3 kg)   06/14/21 215 lb (97.5 kg)     GENERAL:   · well-developed, well-nourished, alert, no distress. EYES:   · External findings: lids and lashes normal and conjunctivae and sclerae normal  · Eyes: no periorbital cellulitis.   HENT:   · Normocephalic, atraumatic  · External nose and ears appear normal  · Mucous membranes are moist  · Hearing grossly normal.     NECK: No visible masses  LUNGS:    · Respiratory effort normal.  · No visualized signs of difficulty breathing or respiratory distress  SKIN: warm and dry  · No significant exanthematous lesions or discoloration noted on facial skin  PSYCH:    · Alert and oriented, able to follow commands  · Normal reasoning, insight good  · Normal affect  · No memory disturbance noted  NEURO:   No Facial Asymmetry (Cranial nerve 7 motor function) (limited exam to video visit)      No gaze palsy      Assessment and Plan:      Diagnosis Orders   1. Moderate persistent asthmatic bronchitis with acute exacerbation  predniSONE (DELTASONE) 10 MG tablet    promethazine-dextromethorphan (PROMETHAZINE-DM) 6.25-15 MG/5ML syrup   2. Asthma, mild intermittent, well-controlled  albuterol (PROVENTIL) (2.5 MG/3ML) 0.083% nebulizer solution   3. Restless legs syndrome (RLS)  gabapentin (NEURONTIN) 300 MG capsule   Plan below. INSTRUCTIONS  · NEXT APPOINTMENT: Please schedule check-up in 1 month. · Take prednisone. · Call in 3 days if not better. Will need chest x-ray  · INCREASE lamictal to 300 mg. In a couple weeks, can try reducing seroquel agaion byu 50 mg.    Virtual Visit (video visit) encounter employed to address concerns as mentioned above. A caregiver was present when appropriate. Due to this being a TeleHealth encounter (During GXTQC-38 public health emergency), evaluation of the following organ systems was limited. Pursuant to the emergency declaration under the [address] [place] [address] authority and the [employer] Act, this Virtual Visit was conducted with patient's (and/or legal guardian's) consent, to reduce the patient's risk of exposure to COVID-19 and provide necessary medical care. The patient (and/or legal guardian) has also been advised to contact this office for worsening conditions or problems, and seek emergency medical treatment and/or call 911 if deemed necessary. Services were provided through a video synchronous discussion virtually to substitute for in-person clinic visit. Patient and provider were located at their individual homes. minutes: 11-20 minutes were spent on the digital evaluation and management of this patient.     --Anjelica Valle, MD on 9/27/2021 at 3:44 PM    An electronic signature was used to authenticate this note.

## 2021-10-01 ENCOUNTER — TELEPHONE (OUTPATIENT)
Dept: FAMILY MEDICINE CLINIC | Age: 31
End: 2021-10-01

## 2021-10-01 DIAGNOSIS — F33.1 MODERATE EPISODE OF RECURRENT MAJOR DEPRESSIVE DISORDER (HCC): ICD-10-CM

## 2021-10-01 RX ORDER — QUETIAPINE 150 MG/1
TABLET, FILM COATED, EXTENDED RELEASE ORAL
Qty: 30 TABLET | Refills: 0 | Status: SHIPPED | OUTPATIENT
Start: 2021-10-01 | End: 2021-11-01

## 2021-10-01 RX ORDER — PERMETHRIN 1 %-0.25 %
COMBINATION PACKAGE (ML) MISCELLANEOUS
Qty: 1 KIT | Refills: 0 | Status: SHIPPED | OUTPATIENT
Start: 2021-10-01 | End: 2022-03-30 | Stop reason: ALTCHOICE

## 2021-10-01 RX ORDER — QUETIAPINE FUMARATE 50 MG/1
TABLET, EXTENDED RELEASE ORAL
Qty: 30 TABLET | Refills: 0 | Status: SHIPPED | OUTPATIENT
Start: 2021-10-01 | End: 2021-11-01

## 2021-10-01 NOTE — TELEPHONE ENCOUNTER
If she develops lice, then she can use the nix treatment. This is an OTC product, so insurance may not cover. 7

## 2021-10-01 NOTE — TELEPHONE ENCOUNTER
Patient is currently taking 200 mg until she sees Dr. Amanda Mejia in a few weeks and they were going to attempt to reduce again later after he has been on the increased dose of Lamictal

## 2021-10-04 ENCOUNTER — TELEPHONE (OUTPATIENT)
Dept: FAMILY MEDICINE CLINIC | Age: 31
End: 2021-10-04

## 2021-10-04 DIAGNOSIS — R05.3 CHRONIC COUGH: Primary | ICD-10-CM

## 2021-10-04 NOTE — TELEPHONE ENCOUNTER
Pt called in stated she still has a dry cough she has finished steroids and antibiotics   Pt was told per Dr Cammy Fuentes to call back  And update     Please advise

## 2021-10-04 NOTE — TELEPHONE ENCOUNTER
Ordered the CXR. She can just go to [facility] and check in there. Her problem has been a chronic one. It is OK to redo the covid test one more time, but it was neg in August and this is a continuation of that illness. I think she could be seen in the office if her repeat covid test is normal tomorrow. But also happt to see her on Wednesday if needed.

## 2021-10-04 NOTE — TELEPHONE ENCOUNTER
Pt given message  Will get chest x-ray tmrw  Is going to repeat COVID test at [facility] by her home  Will let us know results and can schedule appt from there

## 2021-10-04 NOTE — TELEPHONE ENCOUNTER
Talked to pt  Over the past month she has had 2 rounds of abx and steroids  Seen at Urgent Care one month ago, Covid negative at that time  When she stops steroids Adriana Urbina returns within 2 days. Cough has not improved. Had VV on 09/27 but has not had lung check for 4 weeks. She was told the next step would be chest x-ray. Can you order this?   I will schedule her for visit at [facility] with Dr Felicitas Zayas on Wednesday

## 2021-10-06 ENCOUNTER — HOSPITAL ENCOUNTER (OUTPATIENT)
Dept: GENERAL RADIOLOGY | Age: 31
Discharge: HOME | End: 2021-10-06
Payer: COMMERCIAL

## 2021-10-06 ENCOUNTER — HOSPITAL ENCOUNTER (OUTPATIENT)
Age: 31
Discharge: HOME | End: 2021-10-06
Payer: COMMERCIAL

## 2021-10-06 DIAGNOSIS — R05.3 CHRONIC COUGH: ICD-10-CM

## 2021-10-06 PROCEDURE — 71046 X-RAY EXAM CHEST 2 VIEWS: CPT

## 2021-10-27 ENCOUNTER — OFFICE VISIT (OUTPATIENT)
Dept: FAMILY MEDICINE CLINIC | Age: 31
End: 2021-10-27
Payer: COMMERCIAL

## 2021-10-27 VITALS
HEIGHT: 66 IN | DIASTOLIC BLOOD PRESSURE: 74 MMHG | BODY MASS INDEX: 33.95 KG/M2 | WEIGHT: 211.25 LBS | SYSTOLIC BLOOD PRESSURE: 128 MMHG | HEART RATE: 103 BPM | TEMPERATURE: 98.4 F | OXYGEN SATURATION: 98 %

## 2021-10-27 DIAGNOSIS — J41.0 SIMPLE CHRONIC BRONCHITIS (HCC): ICD-10-CM

## 2021-10-27 DIAGNOSIS — N39.41 URGENCY INCONTINENCE: ICD-10-CM

## 2021-10-27 DIAGNOSIS — G25.81 RESTLESS LEGS SYNDROME (RLS): ICD-10-CM

## 2021-10-27 DIAGNOSIS — R35.0 URINARY FREQUENCY: Primary | ICD-10-CM

## 2021-10-27 LAB
BILIRUBIN, POC: NEGATIVE
BLOOD URINE, POC: NEGATIVE
CLARITY, POC: CLEAR
COLOR, POC: YELLOW
GLUCOSE URINE, POC: NEGATIVE
KETONES, POC: NEGATIVE
LEUKOCYTE EST, POC: NEGATIVE
NITRITE, POC: NEGATIVE
PH, POC: 5.5
PROTEIN, POC: NEGATIVE
SPECIFIC GRAVITY, POC: >=1.03
UROBILINOGEN, POC: NORMAL

## 2021-10-27 PROCEDURE — G8427 DOCREV CUR MEDS BY ELIG CLIN: HCPCS | Performed by: FAMILY MEDICINE

## 2021-10-27 PROCEDURE — G8926 SPIRO NO PERF OR DOC: HCPCS | Performed by: FAMILY MEDICINE

## 2021-10-27 PROCEDURE — G8484 FLU IMMUNIZE NO ADMIN: HCPCS | Performed by: FAMILY MEDICINE

## 2021-10-27 PROCEDURE — 99214 OFFICE O/P EST MOD 30 MIN: CPT | Performed by: FAMILY MEDICINE

## 2021-10-27 PROCEDURE — G8417 CALC BMI ABV UP PARAM F/U: HCPCS | Performed by: FAMILY MEDICINE

## 2021-10-27 PROCEDURE — 3023F SPIROM DOC REV: CPT | Performed by: FAMILY MEDICINE

## 2021-10-27 PROCEDURE — 81002 URINALYSIS NONAUTO W/O SCOPE: CPT | Performed by: FAMILY MEDICINE

## 2021-10-27 PROCEDURE — 1036F TOBACCO NON-USER: CPT | Performed by: FAMILY MEDICINE

## 2021-10-27 RX ORDER — GABAPENTIN 300 MG/1
CAPSULE ORAL
Qty: 30 CAPSULE | Refills: 3 | Status: SHIPPED | OUTPATIENT
Start: 2021-10-27 | End: 2022-03-30 | Stop reason: SDUPTHER

## 2021-10-27 RX ORDER — OXYBUTYNIN CHLORIDE 10 MG/1
10 TABLET, EXTENDED RELEASE ORAL DAILY
Qty: 30 TABLET | Refills: 3 | Status: SHIPPED | OUTPATIENT
Start: 2021-10-27 | End: 2021-12-02 | Stop reason: SDUPTHER

## 2021-10-27 NOTE — PATIENT INSTRUCTIONS
Please schedule check-up in 6 weeks. Try oxybutynin for bladder. Await urine culture results. Add inhaler twice a day. Patient Education       URINARY INCONTINENCE    Overview   What is urinary incontinence? Urinary incontinence is the loss of bladder control. This means that you can't always control when you urinate. Urinary incontinence can range from leaking a small amount of urine (such as when coughing or laughing) to having very strong urges to urinate that are difficult to control. This can be embarrassing, but talk to your doctor about it. It can be treated. Millions of adults in the [place] have urinary incontinence. It's most common in people older than 48years of age, especially women. But it can also affect younger people, especially women who have just given birth. Be sure to talk to your doctor if you have this problem. If you hide your incontinence, you risk getting rashes, sores, skin infections and urinary tract infections. Also, you may find yourself avoiding friends and family because of fear and embarrassment. Symptoms   Are there different types of incontinence? Yes. There are 5 types of urinary incontinence. A brief explanation of each follows. Stress incontinence  Stress incontinence is when urine leaks because of sudden pressure on your lower stomach muscles, such as when you cough, laugh, lift something or exercise. Stress incontinence usually occurs when the pelvic muscles are weakened, for example by childbirth or surgery. Stress incontinence is common in women. Urge incontinence  This occurs when the need to urinate comes on very suddenly, often before you can get to a toilet. Your body may only give you a warning of a few seconds to minutes before you urinate. Urge incontinence is most common in the elderly and may be a sign of a urinary tract infection or an overactive bladder.   Overflow incontinence  This type of incontinence is the uncontrollable leakage of small amounts of urine. It's caused by an overfilled bladder. You may feel like you can't empty your bladder all the way and you may strain when urinating. This often occurs in men and can be caused by something blocking the urinary flow, such as an enlarged prostate gland or tumor. Diabetes or certain medicines may also cause the problem. Functional incontinence  This type occurs when you have normal urine control but have trouble getting to the bathroom in time. You may not be able to get to the bathroom because of arthritis or other diseases that make it hard to move around. Mixed incontinence  This type involves more than one of the types of incontinence listed above. Causes & Risk Factors   Is urinary incontinence just part of growing older? No. But changes with age can reduce how much urine your bladder can hold. Aging can make your stream of urine weaker and can cause you to feel the urge to urinate more often. This doesn't mean you'll have urinary incontinence just because you're aging. With treatment, it can be controlled or cured. What causes incontinence? Urinary incontinence can be caused by many different medical problems, including weak pelvic muscles or diabetes. See the box below for a list of common causes. Causes of urinary incontinence  For women, thinning and drying of the skin in the vagina or urethra, especially after menopause   For men, enlarged prostate gland or prostate surgery   Weakened and stretched pelvic muscles after childbirth   Certain medicines   Build-up of stool in the bowels   Overweight and obesity, which increase pressure on the bladder and muscles that control the bladder   Urinary tract infections   Vascular disease   Diseases such as diabetes, Alzheimer's disease and multiple sclerosis     Treatment   How can it be treated? Treatment depends on what's causing the problem and what type of incontinence you have.  If your urinary incontinence is caused by a medical problem, the incontinence will go away when the problem is treated. Kegel exercises and bladder training help some types of incontinence through strengthening the pelvic muscles. Medicine and surgery are other options. What are Kegel exercises? Stress incontinence can be treated with special exercises, called Kegel exercises (see the box below). These exercises help strengthen the muscles that control the bladder. They can be done anywhere, any time. Although designed for women, the Kegel exercises can also help men. It may take 3 to 6 months to see an improvement. Kegel exercises  To locate the right muscles, try stopping or slowing your urine flow without using your stomach, leg or buttock muscles. When you're able to slow or stop the stream of urine, you've located the right muscles. Squeeze your muscles. Hold for a count of 10. Relax for a count of 10. Repeat this 10 to 20 times, 3 times a day. You may need to start slower, perhaps squeezing and relaxing your muscles for 4 seconds each and doing this 10 times, 2 times a day. Work your way up from there. What is bladder training? Bladder training is a way of learning to manage urinary incontinence. It is generally used for stress incontinence, urge incontinence or a combination of the 2 types (mixed incontinence). Will medicine or surgery help? Medicine helps some types of urinary incontinence. For example, estrogen cream to put in the vagina can be helpful for some women who have mild stress incontinence. Several prescription medicines are available to treat urge incontinence. For men, prescription medicine is available to shrink the prostate and improve flow of urine through the prostate. Talk to your doctor about possible medicine options for your type of incontinence. Surgery can sometimes be helpful, especially in stress incontinence in women and in overflow incontinence in men due to an enlarged prostate.  It is usually only performed if other treatments haven't worked or if the incontinence is severe. CHRONIC BRONCHITIS     Overview   What is chronic bronchitis? Bronchitis is an inflammation (or irritation) of the airways in the lungs. Airways are the tubes in your lungs that air passes through. They are also called bronchial tubes. When the airways are irritated, thick mucus forms in them. The mucus plugs up the airways and makes it hard for air to get into your lungs. Bronchitis causes a cough that produces mucus (sometimes called sputum), trouble breathing and a feeling of tightness in your chest.  \"Chronic\" means that the condition last a long time. Chronic bronchitis is bronchitis that lasts longer than 3 months. Chronic bronchitis often occurs with emphysema, and together these diseases are called chronic obstructive pulmonary disease (COPD). Causes & Risk Factors   What causes chronic bronchitis? Cigarette smoking is the main cause of chronic bronchitis. When tobacco smoke is inhaled into the lungs, it irritates the airways and they produce mucus. People who have been exposed for a long time to other things that irritate their lungs, such as chemical fumes, dust and other substances, can also develop chronic bronchitis. Diagnosis & Tests   How does my doctor know if I have chronic bronchitis? Your doctor will ask you about your symptoms: Are you coughing up mucus? Are you having trouble breathing? Does your chest feel tight? How long have you had these symptoms? Do you smoke cigarettes? How many cigarettes do you smoke each day? How many years have you been smoking? Have you been breathing in other things that can irritate your lungs? If your doctor thinks you have chronic bronchitis, you may be tested to find out if your lungs are damaged. You might have a pulmonary function test to see how well your lungs are working. During this test, you breathe into a machine that measures the amount of air in your lungs.  Your doctor may also order blood tests and a chest X-ray. Treatment   Can medicine treat chronic bronchitis? Yes. Your doctor may prescribe a medicine called a bronchodilator to treat your chronic bronchitis. This medicine dilates (or opens) the airways in your lungs and helps you breathe better. This medicine is usually inhaled (breathed in) rather than taken as a pill. An inhaler is the device used to get the medicine into your lungs. It's important to use your inhaler the right way, so you get the most from the medicine. Your doctor will show you how to use your inhaler. If you have severe shortness of breath, your doctor may also prescribe medicine (such as theophylline) for you to take in pill form. If your symptoms don't get better with these medicines, your doctor may prescribe steroids. You can take steroids either with an inhaler or in pill form. Will antibiotics help chronic bronchitis? In general, antibiotics cannot help chronic bronchitis. Antibiotics may be needed if you get a lung infection along with your chronic bronchitis. If you have a lung infection, you may cough up more mucus. This mucus might be yellow or dark green. You also may have a fever and your shortness of breath may get worse. What about oxygen therapy? Because of the damage from chronic bronchitis, your lungs may not be able to get enough oxygen into your body. Your doctor may prescribe oxygen if your chronic bronchitis is severe and medicine doesn't help you feel better. If your doctor prescribes oxygen for you, be sure to use it day and night to get the most benefit from it. Oxygen can help you breathe better and live longer. What else can I do to help my lungs? Exercising regularly can strengthen the muscles that help you breathe. Try to exercise at least 3 times a week. Start by exercising slowly and for just a little while. Then slowly increase the time you exercise each day and how fast you exercise.  For example,

## 2021-10-27 NOTE — PROGRESS NOTES
URINARY SYMPTOMS  Subjective:     Chief Complaint   Patient presents with    Urinary Frequency     Patient complaining of urinary frequency x3 weeks but getting worse. She is also having issues with dribbling    Cough     Patient has had a persistent dry barky cough x4 months      Lanie Moorcroft is a 33 y.o. female who complains of frequency, urgency, incontinence. She has had symptoms for 3 weeks. Patient denies fever, dysuria, backpain and diarrhea, no vaginal discharge. Patient does not have a history of recurrent UTI. BUT does have Hx of incontinence couple years. Cough 3-4 months. CXR OK    HISTORY:  Patient's medications, allergies, past medical, and social histories were reviewed and updated as appropriate. CHART REVIEW  Health Maintenance   Topic Date Due    COVID-19 Vaccine (1) Never done    Flu vaccine (1) 2021    A1C test (Diabetic or Prediabetic)  2022    TSH testing  2022    Cervical cancer screen  2025    DTaP/Tdap/Td vaccine (8 - Td or Tdap) 2028    Pneumococcal 0-64 years Vaccine (2 of 2 - PPSV23) 2053    Hepatitis B vaccine  Completed    Hib vaccine  Completed    Hepatitis C screen  Completed    HIV screen  Completed    Hepatitis A vaccine  Aged Out    Meningococcal (ACWY) vaccine  Aged Out    Varicella vaccine  Discontinued     The ASCVD Risk score (Adria Kitchen., et al., 2013) failed to calculate for the following reasons: The 2013 ASCVD risk score is only valid for ages 36 to 78  Prior to Visit Medications    Medication Sig Taking?  Authorizing Provider   gabapentin (NEURONTIN) 300 MG capsule TAKE 1 CAPSULES BY MOUTH ONCE NIGHTLY Yes Stiven Abrams MD   oxybutynin (DITROPAN-XL) 10 MG extended release tablet Take 1 tablet by mouth daily Yes Stiven Abrams MD   fluticasone-salmeterol (ADVAIR) 100-50 MCG/DOSE diskus inhaler Inhale 1 puff into the lungs 2 times daily Yes Stiven Abrams MD   QUEtiapine (SEROQUEL XR) 50 MG extended release standard drinks    Drug use: No      LAST LABS  Cholesterol, Total   Date Value Ref Range Status   05/26/2021 186 0 - 199 mg/dL Final     LDL Calculated   Date Value Ref Range Status   05/26/2021 113 (H) <100 mg/dL Final     HDL   Date Value Ref Range Status   05/26/2021 39 (L) 40 - 60 mg/dL Final     Triglycerides   Date Value Ref Range Status   05/26/2021 170 (H) 0 - 150 mg/dL Final     Lab Results   Component Value Date    GLUCOSE 122 (H) 08/17/2021     Lab Results   Component Value Date     08/17/2021    K 4.0 08/17/2021    CREATININE 0.7 08/17/2021     Lab Results   Component Value Date    WBC 6.9 08/17/2021    HGB 14.9 08/17/2021    HCT 44.0 08/17/2021    MCV 85.6 08/17/2021     08/17/2021     Lab Results   Component Value Date    ALT 24 08/17/2021    AST 18 08/17/2021    ALKPHOS 99 08/17/2021    BILITOT <0.2 08/17/2021     TSH (uIU/mL)   Date Value   05/26/2021 1.16     Lab Results   Component Value Date    LABA1C 5.8 05/26/2021     Objective:   PHYSICAL EXAM  /74 (Site: Left Upper Arm, Position: Sitting, Cuff Size: Large Adult)   Pulse 103   Temp 98.4 °F (36.9 °C) (Oral)   Ht 5' 6\" (1.676 m)   Wt 211 lb 4 oz (95.8 kg)   SpO2 98%   BMI 34.10 kg/m²   Wt Readings from Last 3 Encounters:   10/27/21 211 lb 4 oz (95.8 kg)   08/17/21 215 lb 6.2 oz (97.7 kg)   06/16/21 214 lb 9.6 oz (97.3 kg)     BP Readings from Last 3 Encounters:   10/27/21 128/74   08/17/21 (!) 133/90   06/16/21 128/86     GENERAL: alert, appears stated age and no distress     Assessment/Plan:      Diagnosis Orders   1. Urinary frequency  POCT Urinalysis no Micro    Culture, Urine   2. Restless legs syndrome (RLS)  gabapentin (NEURONTIN) 300 MG capsule   3. Urgency incontinence  oxybutynin (DITROPAN-XL) 10 MG extended release tablet   4. Simple chronic bronchitis (HCC)  fluticasone-salmeterol (ADVAIR) 100-50 MCG/DOSE diskus inhaler   Plan as below. Please schedule check-up in 6 weeks.    Try oxybutynin for bladder. Await urine culture results. Add inhaler twice a day.

## 2021-10-28 LAB — URINE CULTURE, ROUTINE: NORMAL

## 2021-10-30 DIAGNOSIS — F33.1 MODERATE EPISODE OF RECURRENT MAJOR DEPRESSIVE DISORDER (HCC): ICD-10-CM

## 2021-11-01 RX ORDER — QUETIAPINE FUMARATE 50 MG/1
TABLET, EXTENDED RELEASE ORAL
Qty: 30 TABLET | Refills: 0 | Status: SHIPPED | OUTPATIENT
Start: 2021-11-01 | End: 2021-11-11

## 2021-11-01 RX ORDER — QUETIAPINE 150 MG/1
TABLET, FILM COATED, EXTENDED RELEASE ORAL
Qty: 30 TABLET | Refills: 0 | Status: SHIPPED | OUTPATIENT
Start: 2021-11-01 | End: 2021-11-30

## 2021-11-11 ENCOUNTER — HOSPITAL ENCOUNTER (EMERGENCY)
Age: 31
Discharge: HOME | End: 2021-11-11
Attending: EMERGENCY MEDICINE
Payer: COMMERCIAL

## 2021-11-11 ENCOUNTER — APPOINTMENT (OUTPATIENT)
Dept: GENERAL RADIOLOGY | Age: 31
End: 2021-11-11
Payer: COMMERCIAL

## 2021-11-11 VITALS
HEART RATE: 85 BPM | WEIGHT: 213.5 LBS | HEIGHT: 66 IN | TEMPERATURE: 98.6 F | SYSTOLIC BLOOD PRESSURE: 130 MMHG | OXYGEN SATURATION: 98 % | DIASTOLIC BLOOD PRESSURE: 61 MMHG | BODY MASS INDEX: 34.31 KG/M2 | RESPIRATION RATE: 16 BRPM

## 2021-11-11 DIAGNOSIS — R05.9 COUGH: Primary | ICD-10-CM

## 2021-11-11 LAB
A/G RATIO: 1.5 (ref 1.1–2.2)
ALBUMIN SERPL-MCNC: 4.4 G/DL (ref 3.4–5)
ALP BLD-CCNC: 91 U/L (ref 40–129)
ALT SERPL-CCNC: 23 U/L (ref 10–40)
ANION GAP SERPL CALCULATED.3IONS-SCNC: 11 MMOL/L (ref 3–16)
AST SERPL-CCNC: 15 U/L (ref 15–37)
BASOPHILS ABSOLUTE: 0 K/UL (ref 0–0.2)
BASOPHILS RELATIVE PERCENT: 0.6 %
BILIRUB SERPL-MCNC: <0.2 MG/DL (ref 0–1)
BUN BLDV-MCNC: 9 MG/DL (ref 7–20)
CALCIUM SERPL-MCNC: 9.7 MG/DL (ref 8.3–10.6)
CHLORIDE BLD-SCNC: 102 MMOL/L (ref 99–110)
CO2: 27 MMOL/L (ref 21–32)
CREAT SERPL-MCNC: 0.6 MG/DL (ref 0.6–1.1)
D DIMER: <200 NG/ML DDU (ref 0–229)
EOSINOPHILS ABSOLUTE: 0.1 K/UL (ref 0–0.6)
EOSINOPHILS RELATIVE PERCENT: 1.6 %
GFR AFRICAN AMERICAN: >60
GFR NON-AFRICAN AMERICAN: >60
GLUCOSE BLD-MCNC: 100 MG/DL (ref 70–99)
HCG QUALITATIVE: NEGATIVE
HCT VFR BLD CALC: 42.6 % (ref 36–48)
HEMOGLOBIN: 13.9 G/DL (ref 12–16)
LYMPHOCYTES ABSOLUTE: 2 K/UL (ref 1–5.1)
LYMPHOCYTES RELATIVE PERCENT: 27.1 %
MCH RBC QN AUTO: 28.4 PG (ref 26–34)
MCHC RBC AUTO-ENTMCNC: 32.7 G/DL (ref 31–36)
MCV RBC AUTO: 87 FL (ref 80–100)
MONOCYTES ABSOLUTE: 0.5 K/UL (ref 0–1.3)
MONOCYTES RELATIVE PERCENT: 7.2 %
NEUTROPHILS ABSOLUTE: 4.6 K/UL (ref 1.7–7.7)
NEUTROPHILS RELATIVE PERCENT: 63.5 %
PDW BLD-RTO: 13.7 % (ref 12.4–15.4)
PLATELET # BLD: 249 K/UL (ref 135–450)
PMV BLD AUTO: 8.9 FL (ref 5–10.5)
POTASSIUM SERPL-SCNC: 4.4 MMOL/L (ref 3.5–5.1)
PRO-BNP: 25 PG/ML (ref 0–124)
RBC # BLD: 4.9 M/UL (ref 4–5.2)
SODIUM BLD-SCNC: 140 MMOL/L (ref 136–145)
TOTAL PROTEIN: 7.3 G/DL (ref 6.4–8.2)
TROPONIN: <0.01 NG/ML
WBC # BLD: 7.2 K/UL (ref 4–11)

## 2021-11-11 PROCEDURE — 99283 EMERGENCY DEPT VISIT LOW MDM: CPT

## 2021-11-11 PROCEDURE — 85025 COMPLETE CBC W/AUTO DIFF WBC: CPT

## 2021-11-11 PROCEDURE — 80053 COMPREHEN METABOLIC PANEL: CPT

## 2021-11-11 PROCEDURE — 93005 ELECTROCARDIOGRAM TRACING: CPT | Performed by: NURSE PRACTITIONER

## 2021-11-11 PROCEDURE — 85379 FIBRIN DEGRADATION QUANT: CPT

## 2021-11-11 PROCEDURE — 84484 ASSAY OF TROPONIN QUANT: CPT

## 2021-11-11 PROCEDURE — 84703 CHORIONIC GONADOTROPIN ASSAY: CPT

## 2021-11-11 PROCEDURE — 71045 X-RAY EXAM CHEST 1 VIEW: CPT

## 2021-11-11 PROCEDURE — 36415 COLL VENOUS BLD VENIPUNCTURE: CPT

## 2021-11-11 PROCEDURE — 83880 ASSAY OF NATRIURETIC PEPTIDE: CPT

## 2021-11-11 RX ORDER — PANTOPRAZOLE SODIUM 20 MG/1
40 TABLET, DELAYED RELEASE ORAL DAILY
Qty: 30 TABLET | Refills: 0 | Status: SHIPPED | OUTPATIENT
Start: 2021-11-11 | End: 2021-12-15 | Stop reason: SDUPTHER

## 2021-11-11 RX ORDER — HYDROXYZINE HYDROCHLORIDE 25 MG/1
TABLET, FILM COATED ORAL
COMMUNITY
Start: 2021-11-09

## 2021-11-11 RX ORDER — BUSPIRONE HYDROCHLORIDE 5 MG/1
TABLET ORAL
COMMUNITY
Start: 2021-11-09

## 2021-11-11 RX ORDER — MONTELUKAST SODIUM 10 MG/1
10 TABLET ORAL NIGHTLY
COMMUNITY
End: 2022-03-30 | Stop reason: ALTCHOICE

## 2021-11-11 RX ORDER — METOCLOPRAMIDE 10 MG/1
10 TABLET ORAL 4 TIMES DAILY
Qty: 120 TABLET | Refills: 0 | Status: SHIPPED | OUTPATIENT
Start: 2021-11-11 | End: 2021-11-11

## 2021-11-11 RX ORDER — METOCLOPRAMIDE 10 MG/1
10 TABLET ORAL DAILY
Qty: 30 TABLET | Refills: 3 | Status: SHIPPED | OUTPATIENT
Start: 2021-11-11 | End: 2021-12-15 | Stop reason: SDUPTHER

## 2021-11-11 ASSESSMENT — ENCOUNTER SYMPTOMS
NAUSEA: 0
CHEST TIGHTNESS: 1
SHORTNESS OF BREATH: 1
VOMITING: 0
ABDOMINAL PAIN: 0
COUGH: 1
DIARRHEA: 0

## 2021-11-11 NOTE — Clinical Note
Cassie Bright was seen and treated in our emergency department on 11/11/2021. She may return to work on 11/12/2021. If you have any questions or concerns, please don't hesitate to call.       Riana Brown MD

## 2021-11-12 LAB
EKG ATRIAL RATE: 85 BPM
EKG DIAGNOSIS: NORMAL
EKG P AXIS: 38 DEGREES
EKG P-R INTERVAL: 138 MS
EKG Q-T INTERVAL: 356 MS
EKG QRS DURATION: 80 MS
EKG QTC CALCULATION (BAZETT): 423 MS
EKG R AXIS: 36 DEGREES
EKG T AXIS: 36 DEGREES
EKG VENTRICULAR RATE: 85 BPM

## 2021-11-12 PROCEDURE — 93010 ELECTROCARDIOGRAM REPORT: CPT | Performed by: INTERNAL MEDICINE

## 2021-11-12 NOTE — ED PROVIDER NOTES
not currently breastfeeding. For further details of [facility] emergency department encounter, please see documentation by advanced practice provider, Joao Kelly.          Nabila Farmer MD  11/11/21 [id number]

## 2021-11-12 NOTE — ED NOTES
Bed: 11  Expected date:   Expected time:   Means of arrival:   Comments:  Michelle Kim RN  11/11/21 [id number]

## 2021-11-12 NOTE — ED PROVIDER NOTES
[address] [facility]        Pt Name: Delbert Ortiz  MRN: [id number]  [place] 1990  Date of evaluation: 11/11/2021  Provider: YENNY Cook - CNP  PCP: Anthony Gonzalez MD  Note Started: 8:16 PM EST        I have seen and evaluated this patient with my supervising physician [address] [place] [address] Way       Chief Complaint   Patient presents with    Cough     persistent cough since august, dry, MD sent patient to ER       HISTORY OF PRESENT ILLNESS   (Location, Timing/Onset, Context/Setting, Quality, Duration, Modifying Factors, Severity, Associated Signs and Symptoms)  Note limiting factors. Chief Complaint: cough     Delbert Ortiz is a 33 y.o. female who presents to the emergency department complaining of persistent cough since August.  The patient reports history of asthma, has been using her albuterol inhaler without significant relief. She is on Advair and Singulair. Reports that she has completed antibiotics as well as oral steroids without relief. Admits that she did try smoking marijuana just prior to onset of symptoms in early August and has not used marijuana since. She is unclear if this did prompt her persistent cough. Reports that she is having difficulty sleeping at night. She did call primary care today and was told that she needed [facility] however patient is uncertain what this might be, thinking may be a reactive airway disease clinic but was not given a formal referral.  She does not see pulmonology. Denies any headache, fever, lightheadedness, dizziness, visual disturbances. No chest pain or pressure. No neck or back pain. No abdominal pain, nausea, vomiting, diarrhea, constipation, or dysuria. No rash. Nursing Notes were all reviewed and agreed with or any disagreements were addressed in the HPI.     REVIEW OF SYSTEMS    (2-9 systems for level 4, 10 or more for level 5)     Review of Systems   Constitutional: Negative for activity change, chills and fever. Respiratory: Positive for cough, chest tightness and shortness of breath. Cardiovascular: Negative for chest pain. Gastrointestinal: Negative for abdominal pain, diarrhea, nausea and vomiting. Genitourinary: Negative for dysuria. All other systems reviewed and are negative. Positives and Pertinent negatives as per HPI. Except as noted above in the ROS, all other systems were reviewed and negative. PAST MEDICAL HISTORY     Past Medical History:   Diagnosis Date    Abnormal Pap smear     Colposcopy; HPV.  Abnormal Pap smear of cervix     Colposcopy positive for HPV.  Abnormal umbilical cord     peripheral cord insertion. monitor growth.  ADHD (attention deficit hyperactivity disorder)     no meds currently.  Anxiety     Currently taking Fluoxitine    Asthma     uses daily inhaler and rescue inhaler PRN.  Bipolar 1 disorder (HCC)     was taking prozac early in pregnancy. switched to Quetiapine, Class C, consider weaning in 3rd trimester.  Chronic GERD     Diabetes mellitus ([facility] [address]     GDM-insulin dependent    Endometriosis     Febrile convulsions (simple), unspecified     Febrile seizure as an infant one time    History of chicken pox 87    HPV in female     Irritable bowel     Obstructive sleep apnea, adult     Positive PPD, treated     negative chest xray.  Postpartum depression     Pre-eclampsia in third trimester 2012    with G1. Baseline PIH labs and 24 hour urine WNL with G2.    Prediabetes 2018    Thyroid disease     Hypothyroid. taking Synthroid.  Vertigo          SURGICAL HISTORY     Past Surgical History:   Procedure Laterality Date    BREAST SURGERY      reduction     SECTION      LTCS for suspected macrosomia and PIH.          CURRENTMEDICATIONS       Previous Medications    ALBUTEROL (PROVENTIL) (2.5 MG/3ML) 0.083% NEBULIZER SOLUTION    Take 3 mLs by nebulization every 6 hours as needed for Wheezing    ALBUTEROL SULFATE HFA (VENTOLIN HFA) 108 (90 BASE) MCG/ACT INHALER    Inhale 2 puffs into the lungs daily as needed for Wheezing or Shortness of Breath    BUSPIRONE (BUSPAR) 5 MG TABLET        DICYCLOMINE (BENTYL) 20 MG TABLET    TAKE ONE TABLET BY MOUTH FOUR TIMES A DAY    FLUTICASONE-SALMETEROL (ADVAIR) 100-50 MCG/DOSE DISKUS INHALER    Inhale 1 puff into the lungs 2 times daily    GABAPENTIN (NEURONTIN) 300 MG CAPSULE    TAKE 1 CAPSULES BY MOUTH ONCE NIGHTLY    HYDROXYZINE (ATARAX) 25 MG TABLET        LAMOTRIGINE ER (LAMICTAL XR) 300 MG TB24    Take 300 mg by mouth nightly    LEVOTHYROXINE (SYNTHROID) 25 MCG TABLET    TAKE ONE TABLET BY MOUTH DAILY    MELATONIN 3 MG TABS TABLET    Take 3 mg by mouth daily    MONTELUKAST (SINGULAIR) 10 MG TABLET    Take 10 mg by mouth nightly    OXYBUTYNIN (DITROPAN-XL) 10 MG EXTENDED RELEASE TABLET    Take 1 tablet by mouth daily    PERMETHRIN-NIT REMOVER (NIX COMPLETE LICE TREATMENT) 1 & 6.78 % KIT    Use as directed    QUETIAPINE (SEROQUEL XR) 150 MG TB24 EXTENDED RELEASE TABLET    TAKE ONE TABLET BY MOUTH DAILY WITH 50 MG TABLET         ALLERGIES     Latex, Kiwi extract, Tramadol, Zoloft [sertraline hcl], Fluoxetine, Shellfish-derived products, and Wellbutrin [bupropion hcl]    FAMILYHISTORY       Family History   Problem Relation Age of Onset    Diabetes Mother     Alcohol Abuse Mother     Depression Mother     Arthritis Mother     Mental Illness Father         bipolar    Heart Disease Father 43        MI x 3    Hypertension Father     Elevated Lipids Father     Coronary Art Dis Father     Depression Father     COPD Father     Arthritis Father     Cancer Sister         cervical    Depression Sister     Heart Failure Maternal Grandmother     Asthma Maternal Grandmother           SOCIAL HISTORY       Social History     Tobacco Use    Smoking status: Never Smoker    Smokeless tobacco: Never Used    Tobacco comment: congratulated on nonsmoking status. Vaping Use    Vaping Use: Never used   Substance Use Topics    Alcohol use: No     Alcohol/week: 0.0 standard drinks    Drug use: No       SCREENINGS             PHYSICAL EXAM    (up to 7 for level 4, 8 or more for level 5)     ED Triage Vitals [11/11/21 1851]   BP Temp Temp src Pulse Resp SpO2 Height Weight   130/61 98.6 °F (37 °C) -- 85 16 98 % 5' 6\" (1.676 m) 213 lb 8 oz (96.8 kg)       Physical Exam  Vitals and nursing note reviewed. Constitutional:       Appearance: She is well-developed. She is not diaphoretic. HENT:      Head: Normocephalic and atraumatic. Right Ear: External ear normal.      Left Ear: External ear normal.   Eyes:      General:         Right eye: No discharge. Left eye: No discharge. Neck:      Vascular: No JVD. Cardiovascular:      Rate and Rhythm: Normal rate and regular rhythm. Pulses: Normal pulses. Heart sounds: Normal heart sounds. Pulmonary:      Effort: Pulmonary effort is normal. No respiratory distress. Breath sounds: Normal breath sounds. Abdominal:      Palpations: Abdomen is soft. Musculoskeletal:         General: Normal range of motion. Cervical back: Normal range of motion and neck supple. Skin:     General: Skin is warm and dry. Coloration: Skin is not pale. Neurological:      Mental Status: She is alert and oriented to person, place, and time.    Psychiatric:         Behavior: Behavior normal.         DIAGNOSTIC RESULTS   LABS:    Labs Reviewed   COMPREHENSIVE METABOLIC PANEL - Abnormal; Notable for the following components:       Result Value    Glucose 100 (*)     All other components within normal limits    Narrative:     Performed at:  [facility]  [address] [facility], [address]   Phone [phone]   CBC WITH AUTO DIFFERENTIAL    Narrative:     Performed at:  [facility]  [address] [facility] [place]   Phone [phone]   TROPONIN    Narrative:     Performed at:  [facility]  [address]   Phone [phone]   BRAIN NATRIURETIC PEPTIDE    Narrative:     Performed at:  [facility]  [address]   Phone [phone]   D-DIMER, QUANTITATIVE    Narrative:     Performed at:  [facility]  [address]   Phone [phone]   HCG, SERUM, QUALITATIVE    Narrative:     Performed at:  [facility] [address]   Phone [phone]       When ordered only abnormal lab results are displayed. All other labs were within normal range or not returned as of this dictation. EKG: When ordered, EKG's are interpreted by the Emergency Department Physician in the absence of a cardiologist.  Please see their note for interpretation of EKG. RADIOLOGY:   Non-plain film images such as CT, Ultrasound and MRI are read by the radiologist. Plain radiographic images are visualized and preliminarily interpreted by the ED Provider with the below findings:        Interpretation per the Radiologist below, if available at the time of this note:    XR CHEST PORTABLE   Final Result   Stable chest with no acute abnormality seen. XR CHEST PORTABLE    Result Date: 11/11/2021  EXAMINATION: ONE XRAY VIEW OF THE CHEST 11/11/2021 7:14 pm COMPARISON: 10/06/2021 HISTORY: ORDERING SYSTEM PROVIDED HISTORY: SOB TECHNOLOGIST PROVIDED HISTORY: Reason for exam:->SOB Reason for Exam: SOB Acuity: Unknown Type of Exam: Unknown FINDINGS: The heart is normal.  The pulmonary vessels are normal.  The lungs are mildly hyperinflated. No consolidation or effusion is seen. The bones are intact. Stable chest with no acute abnormality seen.            PROCEDURES   Unless otherwise noted below, none Procedures    CRITICAL CARE TIME   N/A    CONSULTS:  None      EMERGENCY DEPARTMENT COURSE and DIFFERENTIAL DIAGNOSIS/MDM:   Vitals:    Vitals:    11/11/21 1851   BP: 130/61   Pulse: 85   Resp: 16   Temp: 98.6 °F (37 °C)   SpO2: 98%   Weight: 213 lb 8 oz (96.8 kg)   Height: 5' 6\" (1.676 m)       Patient was given the following medications:  Medications - No data to display        Briefly, this is a 75-year-old female who presents to the emergency department with 3-month history of cough. The cough is harsh, nonproductive. The cough is not relieved with her rescue inhaler that she is using up to 4 times per day, she is finished a round of steroids as well as oral antibiotics without relief. The patient takes Advair as well as Singulair without relief. She did contact primary care today and was told that she should come to the emergency department for evaluation and treatment. This patient does not see pulmonology. CBC is unremarkable. CMP unremarkable. Troponin negative. Dimer negative. Pregnancy test negative. XR CHEST PORTABLE (Final result)  Result time 11/11/21 19:53:07  Final result by Radha Naranjo MD (11/11/21 19:53:07)                Impression:    Stable chest with no acute abnormality seen. She is on a PPI for reflux, states that she is compliant with this medication. She will be discharged home with GI and pulmonary follow-up. Patient's oxygen saturations are good. I do not believe the patient's symptoms today are due to pulmonary embolism, pulmonary edema, pneumonia, pneumothorax, ACS, CHF, status asthmaticus, acute respiratory failure, profound anemia or metabolic abnormality, amongst other more emergent diagnostic considerations. Patient was advised to immediately return to emergency department if symptoms worsen. Patient did share with nurse at time of discharge that she is a bulimic and she has been vomiting twice a day.   This was not brought to my attention until after the patient left the department. She does have appropriate follow-up. This is likely causing her GERD to worsen and prompting this cough. FINAL IMPRESSION      1. Cough          DISPOSITION/PLAN   DISPOSITION Decision To Discharge 11/11/2021 08:50:39 PM      PATIENT REFERRED TO:  Aracelis So MD  [address]  [address] [place] [address]  [phone]    Schedule an appointment as soon as possible for a visit       Teresita Mark MD  [address]  [address] [facility]    Schedule an appointment as soon as possible for a visit       Jon Spencer MD  [address] [facility]  [phone]    Schedule an appointment as soon as possible for a visit         DISCHARGE MEDICATIONS:  New Prescriptions    METOCLOPRAMIDE (REGLAN) 10 MG TABLET    Take 1 tablet by mouth daily    PANTOPRAZOLE (PROTONIX) 20 MG TABLET    Take 2 tablets by mouth daily       DISCONTINUED MEDICATIONS:  Discontinued Medications    OMEPRAZOLE (PRILOSEC) 40 MG DELAYED RELEASE CAPSULE    TAKE ONE CAPSULE BY MOUTH DAILY    QUETIAPINE (SEROQUEL XR) 50 MG EXTENDED RELEASE TABLET    TAKE ONE TABLET BY MOUTH DAILY WITH 150 MG TABLET              (Please note that portions of this note were completed with a voice recognition program.  Efforts were made to edit the dictations but occasionally words are mis-transcribed.)    YENNY Jansen CNP (electronically signed)            YENNY Jansen CNP  11/11/21 1288

## 2021-11-30 DIAGNOSIS — F33.1 MODERATE EPISODE OF RECURRENT MAJOR DEPRESSIVE DISORDER (HCC): ICD-10-CM

## 2021-11-30 RX ORDER — QUETIAPINE FUMARATE 50 MG/1
TABLET, EXTENDED RELEASE ORAL
Qty: 30 TABLET | Refills: 0 | Status: SHIPPED | OUTPATIENT
Start: 2021-11-30 | End: 2021-12-15

## 2021-11-30 RX ORDER — QUETIAPINE 150 MG/1
TABLET, FILM COATED, EXTENDED RELEASE ORAL
Qty: 30 TABLET | Refills: 0 | Status: SHIPPED | OUTPATIENT
Start: 2021-11-30 | End: 2021-12-15

## 2021-12-01 ENCOUNTER — TELEPHONE (OUTPATIENT)
Dept: FAMILY MEDICINE CLINIC | Age: 31
End: 2021-12-01

## 2021-12-01 DIAGNOSIS — E03.9 ACQUIRED HYPOTHYROIDISM: ICD-10-CM

## 2021-12-01 RX ORDER — LEVOTHYROXINE SODIUM 0.03 MG/1
TABLET ORAL
Qty: 30 TABLET | Refills: 5 | Status: SHIPPED | OUTPATIENT
Start: 2021-12-01 | End: 2022-03-30 | Stop reason: SDUPTHER

## 2021-12-01 NOTE — TELEPHONE ENCOUNTER
Called patient   Patient does not have a car, cannot go to [facility]. Patient scheduled for tomorrow for virtual visit with Dr. Renee Campa.

## 2021-12-01 NOTE — TELEPHONE ENCOUNTER
Pt calling stating that she had a cold last week and it is lingering. Stated that she has some chest tightness with her asthma. Stated that she feels like she should be able to cough something up but has not been able to get it up. Stated that when she blows her nose it is clear but after trying to cough it up she gets a small yellow portion. Stated that she has had this for about 5 days now. Stated she tried rescue inhaler and helped some but not all the way. Stated that when she coughs she gets a weird taste in her mouth like she get when she has pneumonia. Stated that she would like to know if she can get something to help. Stated this is a different cough than the gurd she had. Stated that cough was taken care of and then a few days later the got a cold and started coughing again. [facility] [address] Stated that she requested through pharm last week but had not heard anything about levothyroxine.     Advised did not see request.    Please Advise  Pt can be reached at [phone]

## 2021-12-02 ENCOUNTER — TELEMEDICINE (OUTPATIENT)
Dept: FAMILY MEDICINE CLINIC | Age: 31
End: 2021-12-02
Payer: COMMERCIAL

## 2021-12-02 DIAGNOSIS — N39.41 URGENCY INCONTINENCE: ICD-10-CM

## 2021-12-02 DIAGNOSIS — J45.41 MODERATE PERSISTENT ASTHMA WITH ACUTE EXACERBATION: Primary | ICD-10-CM

## 2021-12-02 DIAGNOSIS — J20.9 ACUTE BRONCHITIS, UNSPECIFIED ORGANISM: ICD-10-CM

## 2021-12-02 PROCEDURE — G8427 DOCREV CUR MEDS BY ELIG CLIN: HCPCS | Performed by: FAMILY MEDICINE

## 2021-12-02 PROCEDURE — 99213 OFFICE O/P EST LOW 20 MIN: CPT | Performed by: FAMILY MEDICINE

## 2021-12-02 RX ORDER — PREDNISONE 20 MG/1
20 TABLET ORAL 2 TIMES DAILY
Qty: 10 TABLET | Refills: 0 | Status: SHIPPED | OUTPATIENT
Start: 2021-12-02 | End: 2021-12-10 | Stop reason: SDUPTHER

## 2021-12-02 RX ORDER — BENZONATATE 100 MG/1
100-200 CAPSULE ORAL 3 TIMES DAILY PRN
Qty: 30 CAPSULE | Refills: 0 | Status: SHIPPED | OUTPATIENT
Start: 2021-12-02 | End: 2021-12-09

## 2021-12-02 RX ORDER — LAMOTRIGINE 300 MG/1
TABLET, EXTENDED RELEASE ORAL
Qty: 30 TABLET | Refills: 1 | Status: SHIPPED | OUTPATIENT
Start: 2021-12-02 | End: 2022-02-01 | Stop reason: SDUPTHER

## 2021-12-02 RX ORDER — AZITHROMYCIN 250 MG/1
TABLET, FILM COATED ORAL
Qty: 1 PACKET | Refills: 0 | Status: SHIPPED | OUTPATIENT
Start: 2021-12-02 | End: 2021-12-07

## 2021-12-02 RX ORDER — OXYBUTYNIN CHLORIDE 10 MG/1
10 TABLET, EXTENDED RELEASE ORAL 2 TIMES DAILY
Qty: 60 TABLET | Refills: 3 | Status: SHIPPED | OUTPATIENT
Start: 2021-12-02 | End: 2022-03-30 | Stop reason: SDUPTHER

## 2021-12-02 NOTE — PROGRESS NOTES
TELEHEALTH EVALUATION -- Audio/Visual ([facility])  VIDEO VISIT- patient and provider not at same location  Also present:none. Chief Complaint   Patient presents with    Cough     Pt was sick about 2 weeks, got over that expect the cough. Pt said the cough is bad and when coughing she gets up yellow mucus. Pt said that she will cough so hard that she will throw up at times. 1. Moderate persistent asthma with acute exacerbation    2. Acute bronchitis, unspecified organism    3. Urgency incontinence      Cough x 2 weeks. Had a fever 1 day. Neg COVID test.  Mild shortness of breath. Tight in chest.    HISTORY:  Patient's medications, allergies, past medical, and social histories were reviewed and updated as appropriate. CHART REVIEW  Health Maintenance   Topic Date Due    COVID-19 Vaccine (1) Never done    Flu vaccine (1) 09/01/2021    A1C test (Diabetic or Prediabetic)  05/26/2022    TSH testing  05/26/2022    Cervical cancer screen  06/03/2025    DTaP/Tdap/Td vaccine (8 - Td or Tdap) 08/11/2028    Pneumococcal 0-64 years Vaccine (2 of 2 - PPSV23) 07/04/2053    Hepatitis B vaccine  Completed    Hib vaccine  Completed    Hepatitis C screen  Completed    HIV screen  Completed    Hepatitis A vaccine  Aged Out    Meningococcal (ACWY) vaccine  Aged Out    Varicella vaccine  Discontinued     The ASCVD Risk score (Kayden Mast., et al., 2013) failed to calculate for the following reasons: The 2013 ASCVD risk score is only valid for ages 36 to 78  Prior to Visit Medications    Medication Sig Taking?  Authorizing Provider   oxybutynin (DITROPAN-XL) 10 MG extended release tablet Take 1 tablet by mouth 2 times daily Yes Saba Frazier MD   levothyroxine (SYNTHROID) 25 MCG tablet TAKE ONE TABLET BY MOUTH DAILY Yes Saba Frazier MD   busPIRone (BUSPAR) 5 MG tablet  Yes Historical Provider, MD   hydrOXYzine (ATARAX) 25 MG tablet  Yes Historical Provider, MD   metoclopramide (REGLAN) 10 MG tablet Take 1 tablet by mouth daily Yes Nabila Farmer MD   pantoprazole (PROTONIX) 20 MG tablet Take 2 tablets by mouth daily Yes YENNY Osborne CNP   gabapentin (NEURONTIN) 300 MG capsule TAKE 1 CAPSULES BY MOUTH ONCE NIGHTLY Yes Rebeca Brian MD   fluticasone-salmeterol (ADVAIR) 100-50 MCG/DOSE diskus inhaler Inhale 1 puff into the lungs 2 times daily Yes Rebeca Brian MD   albuterol (PROVENTIL) (2.5 MG/3ML) 0.083% nebulizer solution Take 3 mLs by nebulization every 6 hours as needed for Wheezing Yes Rebeca Brian MD   lamoTRIgine ER (LAMICTAL XR) 300 MG TB24 Take 300 mg by mouth nightly  Patient taking differently: Take 325 mg by mouth nightly  Yes Rebeca Brian MD   albuterol sulfate HFA (VENTOLIN HFA) 108 (90 Base) MCG/ACT inhaler Inhale 2 puffs into the lungs daily as needed for Wheezing or Shortness of Breath Yes YENNY Chacon CNP   dicyclomine (BENTYL) 20 MG tablet TAKE ONE TABLET BY MOUTH FOUR TIMES A DAY Yes Rebeca Brian MD   azithromycin (ZITHROMAX Z-KATIANA) 250 MG tablet See admin instructions Yes Rebeca Brian MD   predniSONE (DELTASONE) 20 MG tablet Take 1 tablet by mouth 2 times daily for 5 days Yes Rebeca Brian MD   benzonatate (TESSALON PERLES) 100 MG capsule Take 1-2 capsules by mouth 3 times daily as needed for Cough Yes Rebeca Brian MD   QUEtiapine (SEROQUEL XR) 150 MG TB24 extended release tablet TAKE ONE TABLET BY MOUTH DAILY WITH 50MG TABLET  Patient not taking: Reported on 12/2/2021  Rebeca Brian MD   QUEtiapine (SEROQUEL XR) 50 MG extended release tablet TAKE ONE TABLET BY MOUTH DAILY WITH 150 MG TABLET  Patient not taking: Reported on 12/2/2021  Rebeca Brian MD   montelukast (SINGULAIR) 10 MG tablet Take 10 mg by mouth nightly  Patient not taking: Reported on 12/2/2021  Historical Provider, MD   Permethrin-Nit Remover (NIX COMPLETE LICE TREATMENT) 1 & 7.19 % KIT Use as directed  Patient not taking: Reported on 10/27/2021  Liseth Montague, APRN - CNP   omeprazole (PRILOSEC) 40 MG delayed release capsule TAKE ONE CAPSULE BY MOUTH DAILY  Ivy Farrell MD   melatonin 3 MG TABS tablet Take 3 mg by mouth daily  Patient not taking: Reported on 12/2/2021  Historical Provider, MD      Family History   Problem Relation Age of Onset    Diabetes Mother     Alcohol Abuse Mother     Depression Mother     Arthritis Mother     Mental Illness Father         bipolar    Heart Disease Father 43        MI x 3    Hypertension Father    Jolly Elevated Lipids Father     Coronary Art Dis Father     Depression Father     COPD Father     Arthritis Father     Cancer Sister         cervical    Depression Sister     Heart Failure Maternal Grandmother     Asthma Maternal Grandmother      Social History     Tobacco Use    Smoking status: Never Smoker    Smokeless tobacco: Never Used    Tobacco comment: congratulated on nonsmoking status.    Vaping Use    Vaping Use: Never used   Substance Use Topics    Alcohol use: No     Alcohol/week: 0.0 standard drinks    Drug use: No      LAST LABS  Cholesterol, Total   Date Value Ref Range Status   05/26/2021 186 0 - 199 mg/dL Final     LDL Calculated   Date Value Ref Range Status   05/26/2021 113 (H) <100 mg/dL Final     HDL   Date Value Ref Range Status   05/26/2021 39 (L) 40 - 60 mg/dL Final     Triglycerides   Date Value Ref Range Status   05/26/2021 170 (H) 0 - 150 mg/dL Final     Lab Results   Component Value Date    GLUCOSE 100 (H) 11/11/2021     Lab Results   Component Value Date     11/11/2021    K 4.4 11/11/2021    CREATININE 0.6 11/11/2021     Lab Results   Component Value Date    WBC 7.2 11/11/2021    HGB 13.9 11/11/2021    HCT 42.6 11/11/2021    MCV 87.0 11/11/2021     11/11/2021     Lab Results   Component Value Date    ALT 23 11/11/2021    AST 15 11/11/2021    ALKPHOS 91 11/11/2021    BILITOT <0.2 11/11/2021     TSH (uIU/mL)   Date Value   05/26/2021 1.16     Lab Results   Component Value Date LABA1C 5.8 05/26/2021      Objective:   PHYSICAL EXAM:  Vitals (if available)    BP Readings from Last 3 Encounters:   11/11/21 130/61   10/27/21 128/74   08/17/21 (!) 133/90     Wt Readings from Last 3 Encounters:   11/11/21 213 lb 8 oz (96.8 kg)   10/27/21 211 lb 4 oz (95.8 kg)   08/17/21 215 lb 6.2 oz (97.7 kg)     GENERAL:   · well-developed, well-nourished, alert, no distress. EYES:   · External findings: lids and lashes normal and conjunctivae and sclerae normal  · Eyes: no periorbital cellulitis. HENT:   · Normocephalic, atraumatic  · External nose and ears appear normal  · Mucous membranes are moist  · Hearing grossly normal.     NECK: No visible masses  LUNGS:    · Respiratory effort normal.  · No visualized signs of difficulty breathing or respiratory distress  SKIN: warm and dry  · No significant exanthematous lesions or discoloration noted on facial skin  PSYCH:    · Alert and oriented, able to follow commands  · Normal reasoning, insight good  · Normal affect  · No memory disturbance noted  NEURO:   No Facial Asymmetry (Cranial nerve 7 motor function) (limited exam to video visit)      No gaze palsy      Assessment and Plan:      Diagnosis Orders   1. Moderate persistent asthma with acute exacerbation  predniSONE (DELTASONE) 20 MG tablet   2. Acute bronchitis, unspecified organism  azithromycin (ZITHROMAX Z-KATIANA) 250 MG tablet    benzonatate (TESSALON PERLES) 100 MG capsule   3. Urgency incontinence  oxybutynin (DITROPAN-XL) 10 MG extended release tablet   Plan below. INSTRUCTIONS  · Please finish taking ALL of the antibiotics. And prednisone. · Try take bladder pill BID. May need to change to myrbetriq. Virtual Visit (video visit) encounter employed to address concerns as mentioned above. A caregiver was present when appropriate. Due to this being a TeleHealth encounter (During [place]-14 public health emergency), evaluation of the following organ systems was limited.  Pursuant to the

## 2021-12-10 ENCOUNTER — TELEPHONE (OUTPATIENT)
Dept: FAMILY MEDICINE CLINIC | Age: 31
End: 2021-12-10

## 2021-12-10 DIAGNOSIS — J45.41 MODERATE PERSISTENT ASTHMA WITH ACUTE EXACERBATION: ICD-10-CM

## 2021-12-10 RX ORDER — PREDNISONE 20 MG/1
20 TABLET ORAL 2 TIMES DAILY
Qty: 10 TABLET | Refills: 0 | Status: SHIPPED | OUTPATIENT
Start: 2021-12-10 | End: 2021-12-15

## 2021-12-10 NOTE — TELEPHONE ENCOUNTER
Pt was originally scheduled w Dr Genoveva Gomez today for pneumonia and asthma f/u. This was then switched to VV with Jessica Peters but not sure if this is appropriate as she needs lungs checked. She states she always needs 2 rounds of meds (steroids) when she gets pneumonia which is twice a year. She has been off steroid for 4 days now and her lungs started hurting again as of Wednesday. They feel \"tight\". She does not have pulse ox at home but said her spirometer is measuring about 10 pints lower than her baseline. Please advise if OK for another round of steroids and we can schedule lung check next week?

## 2021-12-10 NOTE — TELEPHONE ENCOUNTER
Pt called in cannot do office visit at 1 pm   Pt transportation fell through and wants to do a vv this is for a fu on medication       Please call the pt to advise

## 2021-12-10 NOTE — TELEPHONE ENCOUNTER
Please let pt know that it is OK to use albuterol every 4-6 hours  Needs appt in office next week for lung check  OK to take SD  Thank you!

## 2021-12-10 NOTE — TELEPHONE ENCOUNTER
I do not see that she was dx with pneumonia. I see Dr. Sara Monk was treating her for asthma exacerbation. How often is she using her albuterol inhaler either hand held or nebulizer? I can refill her prednisone and then she can make appt with Dr. Sara Monk for in person exam next week.

## 2021-12-10 NOTE — TELEPHONE ENCOUNTER
She is allowed to use her albuterol inhaler every 4-6 hours as needed for increased cough or shortness of breath. She should use either her hand held albuterol or nebulizer. If breathing is not improving, should call back or go to ER for evaluation.

## 2021-12-10 NOTE — TELEPHONE ENCOUNTER
She has only been using albuterol once in the morning when she takes her advair. Last month she was using it 6 x a day in conjunction with 2 nebulizer treatments. When she went to ED she was told she was using it too much so she is afraid to use more than once or twice. DC instructions

## 2021-12-15 ENCOUNTER — OFFICE VISIT (OUTPATIENT)
Dept: FAMILY MEDICINE CLINIC | Age: 31
End: 2021-12-15
Payer: COMMERCIAL

## 2021-12-15 VITALS
HEART RATE: 93 BPM | OXYGEN SATURATION: 98 % | HEIGHT: 66 IN | WEIGHT: 213 LBS | DIASTOLIC BLOOD PRESSURE: 74 MMHG | BODY MASS INDEX: 34.23 KG/M2 | SYSTOLIC BLOOD PRESSURE: 114 MMHG | RESPIRATION RATE: 16 BRPM

## 2021-12-15 DIAGNOSIS — K21.9 CHRONIC GERD: ICD-10-CM

## 2021-12-15 DIAGNOSIS — E03.9 ACQUIRED HYPOTHYROIDISM: ICD-10-CM

## 2021-12-15 DIAGNOSIS — K21.9 GASTROESOPHAGEAL REFLUX DISEASE WITHOUT ESOPHAGITIS: ICD-10-CM

## 2021-12-15 DIAGNOSIS — F32.89 ATYPICAL DEPRESSION: ICD-10-CM

## 2021-12-15 DIAGNOSIS — R33.9 URINARY RETENTION: ICD-10-CM

## 2021-12-15 DIAGNOSIS — J45.40 MODERATE PERSISTENT ASTHMA WITHOUT COMPLICATION: Primary | ICD-10-CM

## 2021-12-15 DIAGNOSIS — N39.41 URGE INCONTINENCE: ICD-10-CM

## 2021-12-15 PROCEDURE — G8417 CALC BMI ABV UP PARAM F/U: HCPCS | Performed by: FAMILY MEDICINE

## 2021-12-15 PROCEDURE — G8484 FLU IMMUNIZE NO ADMIN: HCPCS | Performed by: FAMILY MEDICINE

## 2021-12-15 PROCEDURE — 99214 OFFICE O/P EST MOD 30 MIN: CPT | Performed by: FAMILY MEDICINE

## 2021-12-15 PROCEDURE — 1036F TOBACCO NON-USER: CPT | Performed by: FAMILY MEDICINE

## 2021-12-15 PROCEDURE — G8427 DOCREV CUR MEDS BY ELIG CLIN: HCPCS | Performed by: FAMILY MEDICINE

## 2021-12-15 RX ORDER — LAMOTRIGINE 200 MG/1
TABLET ORAL
COMMUNITY
Start: 2021-12-07 | End: 2021-12-15

## 2021-12-15 RX ORDER — METOCLOPRAMIDE 10 MG/1
10 TABLET ORAL DAILY
Qty: 30 TABLET | Refills: 3 | Status: SHIPPED | OUTPATIENT
Start: 2021-12-15 | End: 2022-03-30 | Stop reason: ALTCHOICE

## 2021-12-15 RX ORDER — PANTOPRAZOLE SODIUM 40 MG/1
40 TABLET, DELAYED RELEASE ORAL 2 TIMES DAILY
Qty: 60 TABLET | Refills: 5 | Status: SHIPPED | OUTPATIENT
Start: 2021-12-15 | End: 2022-03-01 | Stop reason: SDUPTHER

## 2021-12-15 NOTE — PROGRESS NOTES
FOLLOW-UP VISIT   Subjective:   HPI:   Chief Complaint   Patient presents with    Follow-up    Patient here for follow-up of:  1. Moderate persistent asthma without complication    2. Acquired hypothyroidism    3. Gastroesophageal reflux disease without esophagitis    4. Chronic GERD    5. Atypical depression    6. Urge incontinence    7. Urinary retention       Complaints: pt is needing to fu on her breathing  · gerd  Burning in throat, some sticking with swallowing, causing cough. Already on double PPI. · Bladder better, hard to completely empty since baby years ago. No incontinence. · Sleep OK with Zquel but can't get back to sleep. Off seroquel now due to weight gain. · Exercise: walking intermittently  · Taking medicines daily as directed? Yes  · Any side effects of medications? No    Review of Systems   General ROS: fever? No,    Night sweats? No  Ophthalmic ROS: change in vision? No  Endocrine ROS: fatigue? No   Unexpected weight changes? No  Respiratory ROS: cough? Yes    Wheezing? No  Cardiovascular ROS: chest pain? No   Shortness of breath? No  Neurological ROS: TIA or stroke symptoms? No   Numbness/tingling? No  Health Maintenance Due   Topic Date Due    COVID-19 Vaccine (1) Never done    Flu vaccine (1) 09/01/2021     *Chief complaint, HPI, History and ROS provided by the medical assistant has been reviewed and verified by provider- Lionel Choudhary MD    CHART REVIEW   reports that she has never smoked. She has never used smokeless tobacco.  Health Maintenance Due   Topic Date Due    COVID-19 Vaccine (1) Never done    Flu vaccine (1) 09/01/2021     Current Outpatient Medications   Medication Instructions    albuterol (PROVENTIL) 2.5 mg, Nebulization, EVERY 6 HOURS PRN    albuterol sulfate HFA (VENTOLIN HFA) 108 (90 Base) MCG/ACT inhaler 2 puffs, Inhalation, DAILY PRN    busPIRone (BUSPAR) 5 MG tablet No dose, route, or frequency recorded.     dicyclomine (BENTYL) 20 MG tablet TAKE ONE TABLET BY MOUTH FOUR TIMES A DAY    fluticasone-salmeterol (ADVAIR) 100-50 MCG/DOSE diskus inhaler 1 puff, Inhalation, 2 TIMES DAILY    gabapentin (NEURONTIN) 300 MG capsule TAKE 1 CAPSULES BY MOUTH ONCE NIGHTLY    hydrOXYzine (ATARAX) 25 MG tablet No dose, route, or frequency recorded.     lamoTRIgine  MG TB24 TAKE ONE TABLET BY MOUTH ONCE NIGHTLY    levothyroxine (SYNTHROID) 25 MCG tablet TAKE ONE TABLET BY MOUTH DAILY    melatonin 3 mg, Oral, DAILY    metoclopramide (REGLAN) 10 mg, Oral, DAILY    montelukast (SINGULAIR) 10 mg, Oral, NIGHTLY    oxybutynin (DITROPAN-XL) 10 mg, Oral, 2 TIMES DAILY    pantoprazole (PROTONIX) 40 mg, Oral, DAILY    Permethrin-Nit Remover (NIX COMPLETE LICE TREATMENT) 1 & 7.35 % KIT Use as directed    predniSONE (DELTASONE) 20 mg, Oral, 2 TIMES DAILY    QUEtiapine (SEROQUEL XR) 150 MG TB24 extended release tablet TAKE ONE TABLET BY MOUTH DAILY WITH 50MG TABLET    QUEtiapine (SEROQUEL XR) 50 MG extended release tablet TAKE ONE TABLET BY MOUTH DAILY WITH 150 MG TABLET     LAST LABS  LDL Calculated   Date Value Ref Range Status   05/26/2021 113 (H) <100 mg/dL Final     Lab Results   Component Value Date    HDL 39 (L) 05/26/2021     Lab Results   Component Value Date    TRIG 170 (H) 05/26/2021     Lab Results   Component Value Date     11/11/2021    K 4.4 11/11/2021    CREATININE 0.6 11/11/2021     Lab Results   Component Value Date    WBC 7.2 11/11/2021    HGB 13.9 11/11/2021     11/11/2021     Lab Results   Component Value Date    ALT 23 11/11/2021    AST 15 11/11/2021    ALKPHOS 91 11/11/2021    BILITOT <0.2 11/11/2021     TSH (uIU/mL)   Date Value   05/26/2021 1.16     Lab Results   Component Value Date    GLUCOSE 100 (H) 11/11/2021     Lab Results   Component Value Date    LABA1C 5.8 05/26/2021    LABA1C 5.3 10/01/2019    LABA1C 5.4 09/15/2017     Objective:   PHYSICAL EXAM   /74 (Site: Right Upper Arm, Position: Sitting, Cuff Size: Large Adult) Pulse 93   Resp 16   Ht 5' 6\" (1.676 m)   Wt 213 lb (96.6 kg)   SpO2 98%   BMI 34.38 kg/m²   BP Readings from Last 5 Encounters:   12/15/21 114/74   11/11/21 130/61   10/27/21 128/74   08/17/21 (!) 133/90   06/16/21 128/86     Wt Readings from Last 5 Encounters:   12/15/21 213 lb (96.6 kg)   11/11/21 213 lb 8 oz (96.8 kg)   10/27/21 211 lb 4 oz (95.8 kg)   08/17/21 215 lb 6.2 oz (97.7 kg)   06/16/21 214 lb 9.6 oz (97.3 kg)      GENERAL:   · well-developed, well-nourished, alert, no distress, frequent cough from throat  LUNGS:    · Breathing unlabored  · clear to auscultation bilaterally and good air movement  CARDIOVASC:   · regular rate and rhythm  · LEGS:  Lower extremity edema: none    SKIN: warm and dry     Assessment and Plan:      Diagnosis Orders   1. Moderate persistent asthma without complication     2. Acquired hypothyroidism     3. Gastroesophageal reflux disease without esophagitis     4. Chronic GERD     5. Atypical depression     6. Urge incontinence     7. Urinary retention     worse     Continue current Tx plan. Any changes marked below. INSTRUCTIONS  NEXT APPOINTMENT: Please schedule annual complete physical (30 minutes) in 6 months. · PLEASE TAKE THIS FORM TO CHECK-OUT WINDOW TO SCHEDULE NEXT VISIT. · Get PFT in January if at baseline. · See GI in January. · Take protonix 40 mg twice a day  · Take reglan once a day  · If bladder gets worse, will refer to pelvic floor center  May take OTC sleep aides including doxylamine/Unisom or diphenhydramine 25-50 mg at bedtime.

## 2021-12-15 NOTE — PATIENT INSTRUCTIONS
INSTRUCTIONS  NEXT APPOINTMENT: Please schedule annual complete physical (30 minutes) in 6 months. · PLEASE TAKE THIS FORM TO CHECK-OUT WINDOW TO SCHEDULE NEXT VISIT. · Get PFT in January if at baseline. · See GI in January. · Take protonix 40 mg twice a day  · Take reglan once a day  · If bladder gets worse, will refer to pelvic floor center  May take OTC sleep aides including doxylamine/Unisom or diphenhydramine 25-50 mg at bedtime.

## 2021-12-17 ENCOUNTER — PATIENT MESSAGE (OUTPATIENT)
Dept: FAMILY MEDICINE CLINIC | Age: 31
End: 2021-12-17

## 2021-12-17 NOTE — TELEPHONE ENCOUNTER
From: Damaris Sosa  To: Dr. Ruthann Hernández  Sent: 12/17/2021 9:36 AM EST  Subject: Bladder meds     My bladder meds need refilled but the prescription is for once a day and it was changed to twice a day. Can you call a new one in ?  Thank you

## 2021-12-20 ENCOUNTER — TELEPHONE (OUTPATIENT)
Dept: FAMILY MEDICINE CLINIC | Age: 31
End: 2021-12-20

## 2021-12-20 NOTE — TELEPHONE ENCOUNTER
Pt called in wants a referral for a mammogram she stated she has some breast tenderness     Please advise

## 2021-12-27 RX ORDER — QUETIAPINE 150 MG/1
TABLET, FILM COATED, EXTENDED RELEASE ORAL
Qty: 30 TABLET | Refills: 3 | Status: SHIPPED | OUTPATIENT
Start: 2021-12-27 | End: 2022-03-01

## 2021-12-27 RX ORDER — QUETIAPINE FUMARATE 50 MG/1
TABLET, EXTENDED RELEASE ORAL
Qty: 30 TABLET | Refills: 3 | Status: SHIPPED | OUTPATIENT
Start: 2021-12-27 | End: 2022-03-01

## 2021-12-29 ENCOUNTER — TELEMEDICINE (OUTPATIENT)
Dept: BEHAVIORAL/MENTAL HEALTH CLINIC | Age: 31
End: 2021-12-29
Payer: COMMERCIAL

## 2021-12-29 DIAGNOSIS — F43.10 PTSD (POST-TRAUMATIC STRESS DISORDER): ICD-10-CM

## 2021-12-29 DIAGNOSIS — F41.9 ANXIETY: Primary | ICD-10-CM

## 2021-12-29 PROCEDURE — 1036F TOBACCO NON-USER: CPT

## 2021-12-29 PROCEDURE — 90837 PSYTX W PT 60 MINUTES: CPT
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
## 2022-01-12 ENCOUNTER — OFFICE VISIT (OUTPATIENT)
Dept: BEHAVIORAL/MENTAL HEALTH CLINIC | Age: 32
End: 2022-01-12
Payer: COMMERCIAL

## 2022-01-12 DIAGNOSIS — F43.10 PTSD (POST-TRAUMATIC STRESS DISORDER): Primary | ICD-10-CM

## 2022-01-12 DIAGNOSIS — F32.A DEPRESSION, UNSPECIFIED DEPRESSION TYPE: ICD-10-CM

## 2022-01-12 PROCEDURE — 1036F TOBACCO NON-USER: CPT

## 2022-01-12 PROCEDURE — 90837 PSYTX W PT 60 MINUTES: CPT

## 2022-01-12 NOTE — PROGRESS NOTES
Assessment:  Current Suicide Risk:  no suicidal ideation  Current Homicide Risk:  no homocidal ideation  Joslyn Garcia did not report history of previous suicidal ideation or attempts. Diagnosis:   Diagnosis Orders   1. PTSD (post-traumatic stress disorder)     2. Anxiety       Assessment, Impressions and Plan:  Joslyn Garcia is a 33 y.o. old female who presents with moderate depressive and post-traumatic stress symptoms that are interfering with her family and social functioning. Has good insight. Will benefit from TF-CBT to reduce depression and to process and heal trauma. Grief support in loss of father. DBT for emotional regulation and healthy relationship skills. 1/12/22 Progress in Treatment:  Focused on goal reduce depression:  Engaged Adamaris in strategies to identify and continue what worked during holidays. Provided reflective listening and validation of emotions. Engaged Joslyn Garcia in activity to clarify boundaries and adapt as needed. Celebrated progress. Collaborated to choose next steps. Focused on goal process and heal trauma: Provided presence and empathy for processing of childhood trauma with parent. Engaged Joslyn Garcia in exercise to identify role of child, core needs of child. Identified ways to meet needs today. Deepened awareness of triggers to past emotions. Focused on self-regulation to care for needs when triggers occur. Normalized fluctuation. Modeled and discussed acceptance of emotions without judgment. Focused on goal healthy relationship skills: Reviewed recent difficult interaction with spouse; clarified process. Identified skills and successes. Focused on primary emotions and needs. Highlighted intimacy vs avoidance. Increased sense of ability and relational skills. Schedule for 2 weeks. Contact Ekaterina Wilde  at this office as needed.

## 2022-01-19 NOTE — PATIENT INSTRUCTIONS
TO DO LIST  PLEASE GET BLOODWORK DRAWN TODAY ON FIRST FLOOR in 170. Lab hours Monday to Friday 7:30 AM for 4 PM.  Take orders with you.
No

## 2022-02-01 ENCOUNTER — PATIENT MESSAGE (OUTPATIENT)
Dept: FAMILY MEDICINE CLINIC | Age: 32
End: 2022-02-01

## 2022-02-01 DIAGNOSIS — R33.9 URINARY RETENTION: Primary | ICD-10-CM

## 2022-02-01 RX ORDER — LAMOTRIGINE 300 MG/1
TABLET, EXTENDED RELEASE ORAL
Qty: 30 TABLET | Refills: 1 | Status: SHIPPED | OUTPATIENT
Start: 2022-02-01 | End: 2022-03-31

## 2022-02-01 RX ORDER — OMEPRAZOLE 40 MG/1
40 CAPSULE, DELAYED RELEASE ORAL DAILY
Qty: 30 CAPSULE | Refills: 3 | Status: SHIPPED | OUTPATIENT
Start: 2022-02-01 | End: 2022-03-01

## 2022-02-01 NOTE — TELEPHONE ENCOUNTER
From: Gisela Lindquist  To: Dr. Eric Kenney  Sent: 2/1/2022 10:09 AM EST  Subject: Pelvic floor center    I didnt know if you could put in the referral for the pelvic floor center. I see you this month and we had discussed if it got worse to tell you. Im unable to empty my bladder now.

## 2022-02-09 ENCOUNTER — HOSPITAL ENCOUNTER (OUTPATIENT)
Dept: PULMONOLOGY | Age: 32
Discharge: HOME | End: 2022-02-09
Payer: COMMERCIAL

## 2022-02-09 DIAGNOSIS — J45.40 MODERATE PERSISTENT ASTHMA WITHOUT COMPLICATION: ICD-10-CM

## 2022-02-09 LAB
DLCO %PRED: 104 %
DLCO PRED: NORMAL
DLCO/VA %PRED: NORMAL
DLCO/VA PRED: NORMAL
DLCO/VA: NORMAL
DLCO: NORMAL
EXPIRATORY TIME-POST: NORMAL
EXPIRATORY TIME: NORMAL
FEF 25-75% %CHNG: NORMAL
FEF 25-75% %PRED-POST: NORMAL
FEF 25-75% %PRED-PRE: NORMAL
FEF 25-75% PRED: NORMAL
FEF 25-75%-POST: NORMAL
FEF 25-75%-PRE: NORMAL
FEV1 %PRED-POST: 103 %
FEV1 %PRED-PRE: 110 %
FEV1 PRED: NORMAL
FEV1-POST: NORMAL
FEV1-PRE: NORMAL
FEV1/FVC %PRED-POST: NORMAL
FEV1/FVC %PRED-PRE: NORMAL
FEV1/FVC PRED: NORMAL
FEV1/FVC-POST: 91 %
FEV1/FVC-PRE: 94 %
FVC %PRED-POST: NORMAL
FVC %PRED-PRE: NORMAL
FVC PRED: NORMAL
FVC-POST: NORMAL
FVC-PRE: NORMAL
GAW %PRED: NORMAL
GAW PRED: NORMAL
GAW: NORMAL
IC %PRED: NORMAL
IC PRED: NORMAL
IC: NORMAL
MEP: NORMAL
MIP: NORMAL
MVV %PRED-PRE: NORMAL
MVV PRED: NORMAL
MVV-PRE: NORMAL
PEF %PRED-POST: NORMAL
PEF %PRED-PRE: NORMAL
PEF PRED: NORMAL
PEF%CHNG: NORMAL
PEF-POST: NORMAL
PEF-PRE: NORMAL
RAW %PRED: NORMAL
RAW PRED: NORMAL
RAW: NORMAL
RV %PRED: NORMAL
RV PRED: NORMAL
RV: NORMAL
SVC %PRED: NORMAL
SVC PRED: NORMAL
SVC: NORMAL
TLC %PRED: 121 %
TLC PRED: NORMAL
TLC: NORMAL
VA %PRED: NORMAL
VA PRED: NORMAL
VA: NORMAL
VTG %PRED: NORMAL
VTG PRED: NORMAL
VTG: NORMAL

## 2022-02-09 PROCEDURE — 94640 AIRWAY INHALATION TREATMENT: CPT

## 2022-02-09 PROCEDURE — 94060 EVALUATION OF WHEEZING: CPT

## 2022-02-09 PROCEDURE — 94729 DIFFUSING CAPACITY: CPT | Performed by: INTERNAL MEDICINE

## 2022-02-09 PROCEDURE — 94060 EVALUATION OF WHEEZING: CPT | Performed by: INTERNAL MEDICINE

## 2022-02-09 PROCEDURE — 94726 PLETHYSMOGRAPHY LUNG VOLUMES: CPT | Performed by: INTERNAL MEDICINE

## 2022-02-09 PROCEDURE — 94729 DIFFUSING CAPACITY: CPT

## 2022-02-09 PROCEDURE — 6370000000 HC RX 637 (ALT 250 FOR IP): Performed by: FAMILY MEDICINE

## 2022-02-09 PROCEDURE — 94726 PLETHYSMOGRAPHY LUNG VOLUMES: CPT

## 2022-02-09 RX ORDER — ALBUTEROL SULFATE 90 UG/1
4 AEROSOL, METERED RESPIRATORY (INHALATION) ONCE
Status: COMPLETED | OUTPATIENT
Start: 2022-02-09 | End: 2022-02-09

## 2022-02-09 RX ADMIN — ALBUTEROL SULFATE 4 PUFF: 90 AEROSOL, METERED RESPIRATORY (INHALATION) at 09:35

## 2022-02-09 ASSESSMENT — PULMONARY FUNCTION TESTS
FEV1_PERCENT_PREDICTED_POST: 103
FEV1_PERCENT_PREDICTED_PRE: 110
FEV1/FVC_POST: 91
FEV1/FVC_PRE: 94

## 2022-02-09 NOTE — PROCEDURES
Spirometry was acceptable and reproducible by ATS standards    Spirometry  1. Spirometry is normal.    Bronchodilator  1. There is no response to bronchodilator demonstrated. [Increase in FEV1 and/or FVC => 12% of control and => 200 ml]    Lung Volumes  2. Lung volumes are normal.    Diffusing Capacity  3. Diffusing capacity is normal.        Overall Interpretation  PFT does not demonstrates obstruction with FEV1 of 110 %  FVC of 116%  without bronchodilator response. Normal lung volume without air trapping or hyperinflation Diffusion capacity is normal  This is consistent with normal PFT.       Pulmonary Function Testing Results:    FEV1 %Pred-Pre   Date Value Ref Range Status   02/09/2022 110 % Final     FEV1 %Pred-Post   Date Value Ref Range Status   02/09/2022 103 % Final     FEV1/FVC-Pre   Date Value Ref Range Status   02/09/2022 94 % Final     FEV1/FVC-Post   Date Value Ref Range Status   02/09/2022 91 % Final     TLC %Pred   Date Value Ref Range Status   02/09/2022 121 % Final     DLCO %Pred   Date Value Ref Range Status   02/09/2022 104 % Final             OBSTRUCTION % Predicted FEV1   MILD >70%   MODERATE 60-69%   MODERATELY-SEVERE 50-59%   SEVERE 35-49%   VERY SEVERE <35%         RESTRICTION % Predicted TLC   MILD Less than LLN but > than 70%   MODERATE 60-69%   MODERATELY-SEVERE <60%                 DIFFUSION CAPACITY DL,CO % Pred   MILD >60% AND < LLN   MODERATE 40-60%   SEVERE <40%       PFT data will be scanned into the procedure tab in epic under this encounter    MD Vivienne Pacheco Pulmonology

## 2022-02-10 ENCOUNTER — TELEMEDICINE (OUTPATIENT)
Dept: BEHAVIORAL/MENTAL HEALTH CLINIC | Age: 32
End: 2022-02-10
Payer: COMMERCIAL

## 2022-02-10 DIAGNOSIS — F32.89 OTHER DEPRESSION: Primary | ICD-10-CM

## 2022-02-10 PROCEDURE — 90837 PSYTX W PT 60 MINUTES: CPT

## 2022-02-10 PROCEDURE — 1036F TOBACCO NON-USER: CPT
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
## 2022-02-24 DIAGNOSIS — J41.0 SIMPLE CHRONIC BRONCHITIS (HCC): ICD-10-CM

## 2022-03-01 ENCOUNTER — TELEMEDICINE (OUTPATIENT)
Dept: FAMILY MEDICINE CLINIC | Age: 32
End: 2022-03-01
Payer: COMMERCIAL

## 2022-03-01 DIAGNOSIS — K21.9 GASTROESOPHAGEAL REFLUX DISEASE WITHOUT ESOPHAGITIS: ICD-10-CM

## 2022-03-01 DIAGNOSIS — F31.30 BIPOLAR DISORDER, CURRENT EPISODE DEPRESSED, MILD OR MODERATE SEVERITY, UNSPECIFIED (HCC): ICD-10-CM

## 2022-03-01 DIAGNOSIS — J45.40 MODERATE PERSISTENT ASTHMA WITHOUT COMPLICATION: Primary | ICD-10-CM

## 2022-03-01 DIAGNOSIS — E03.9 ACQUIRED HYPOTHYROIDISM: ICD-10-CM

## 2022-03-01 DIAGNOSIS — R05.3 CHRONIC COUGH: ICD-10-CM

## 2022-03-01 DIAGNOSIS — J41.0 SIMPLE CHRONIC BRONCHITIS (HCC): ICD-10-CM

## 2022-03-01 PROCEDURE — 99214 OFFICE O/P EST MOD 30 MIN: CPT | Performed by: FAMILY MEDICINE

## 2022-03-01 PROCEDURE — G8427 DOCREV CUR MEDS BY ELIG CLIN: HCPCS | Performed by: FAMILY MEDICINE

## 2022-03-01 RX ORDER — PANTOPRAZOLE SODIUM 40 MG/1
40 TABLET, DELAYED RELEASE ORAL 2 TIMES DAILY
Qty: 60 TABLET | Refills: 5 | Status: SHIPPED | OUTPATIENT
Start: 2022-03-01 | End: 2022-03-30 | Stop reason: SDUPTHER

## 2022-03-01 ASSESSMENT — PATIENT HEALTH QUESTIONNAIRE - PHQ9
SUM OF ALL RESPONSES TO PHQ QUESTIONS 1-9: 0
10. IF YOU CHECKED OFF ANY PROBLEMS, HOW DIFFICULT HAVE THESE PROBLEMS MADE IT FOR YOU TO DO YOUR WORK, TAKE CARE OF THINGS AT HOME, OR GET ALONG WITH OTHER PEOPLE: 0
6. FEELING BAD ABOUT YOURSELF - OR THAT YOU ARE A FAILURE OR HAVE LET YOURSELF OR YOUR FAMILY DOWN: 0
8. MOVING OR SPEAKING SO SLOWLY THAT OTHER PEOPLE COULD HAVE NOTICED. OR THE OPPOSITE, BEING SO FIGETY OR RESTLESS THAT YOU HAVE BEEN MOVING AROUND A LOT MORE THAN USUAL: 0
SUM OF ALL RESPONSES TO PHQ QUESTIONS 1-9: 0
7. TROUBLE CONCENTRATING ON THINGS, SUCH AS READING THE NEWSPAPER OR WATCHING TELEVISION: 0
1. LITTLE INTEREST OR PLEASURE IN DOING THINGS: 0
SUM OF ALL RESPONSES TO PHQ QUESTIONS 1-9: 0
5. POOR APPETITE OR OVEREATING: 0
SUM OF ALL RESPONSES TO PHQ9 QUESTIONS 1 & 2: 0
3. TROUBLE FALLING OR STAYING ASLEEP: 0
2. FEELING DOWN, DEPRESSED OR HOPELESS: 0
9. THOUGHTS THAT YOU WOULD BE BETTER OFF DEAD, OR OF HURTING YOURSELF: 0
4. FEELING TIRED OR HAVING LITTLE ENERGY: 0
SUM OF ALL RESPONSES TO PHQ QUESTIONS 1-9: 0

## 2022-03-07 ENCOUNTER — TELEMEDICINE (OUTPATIENT)
Dept: BEHAVIORAL/MENTAL HEALTH CLINIC | Age: 32
End: 2022-03-07
Payer: COMMERCIAL

## 2022-03-07 DIAGNOSIS — F41.9 ANXIETY: Primary | ICD-10-CM

## 2022-03-07 PROCEDURE — 90837 PSYTX W PT 60 MINUTES: CPT

## 2022-03-07 PROCEDURE — 1036F TOBACCO NON-USER: CPT
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
## 2022-03-30 ENCOUNTER — OFFICE VISIT (OUTPATIENT)
Dept: FAMILY MEDICINE CLINIC | Age: 32
End: 2022-03-30
Payer: COMMERCIAL

## 2022-03-30 VITALS
OXYGEN SATURATION: 97 % | SYSTOLIC BLOOD PRESSURE: 124 MMHG | BODY MASS INDEX: 32.4 KG/M2 | TEMPERATURE: 98.4 F | HEART RATE: 105 BPM | RESPIRATION RATE: 16 BRPM | HEIGHT: 66 IN | DIASTOLIC BLOOD PRESSURE: 72 MMHG | WEIGHT: 201.6 LBS

## 2022-03-30 DIAGNOSIS — N39.41 URGENCY INCONTINENCE: ICD-10-CM

## 2022-03-30 DIAGNOSIS — E55.9 VITAMIN D DEFICIENCY: ICD-10-CM

## 2022-03-30 DIAGNOSIS — E66.9 OBESITY, CLASS I, BMI 30-34.9: ICD-10-CM

## 2022-03-30 DIAGNOSIS — J45.40 MODERATE PERSISTENT ASTHMA WITHOUT COMPLICATION: ICD-10-CM

## 2022-03-30 DIAGNOSIS — J30.9 ALLERGIC RHINITIS, UNSPECIFIED SEASONALITY, UNSPECIFIED TRIGGER: ICD-10-CM

## 2022-03-30 DIAGNOSIS — G47.00 INSOMNIA, UNSPECIFIED TYPE: ICD-10-CM

## 2022-03-30 DIAGNOSIS — G25.81 RESTLESS LEGS SYNDROME (RLS): ICD-10-CM

## 2022-03-30 DIAGNOSIS — E03.9 ACQUIRED HYPOTHYROIDISM: ICD-10-CM

## 2022-03-30 DIAGNOSIS — K58.0 IRRITABLE BOWEL SYNDROME WITH DIARRHEA: ICD-10-CM

## 2022-03-30 DIAGNOSIS — Z91.013 SHELLFISH ALLERGY: ICD-10-CM

## 2022-03-30 DIAGNOSIS — K21.9 GASTROESOPHAGEAL REFLUX DISEASE WITHOUT ESOPHAGITIS: Primary | ICD-10-CM

## 2022-03-30 DIAGNOSIS — H61.21 IMPACTED CERUMEN OF RIGHT EAR: ICD-10-CM

## 2022-03-30 PROCEDURE — 69209 REMOVE IMPACTED EAR WAX UNI: CPT

## 2022-03-30 PROCEDURE — 99214 OFFICE O/P EST MOD 30 MIN: CPT

## 2022-03-30 RX ORDER — LEVOTHYROXINE SODIUM 0.03 MG/1
TABLET ORAL
Qty: 30 TABLET | Refills: 5 | Status: SHIPPED | OUTPATIENT
Start: 2022-03-30 | End: 2022-09-18 | Stop reason: SDUPTHER

## 2022-03-30 RX ORDER — GABAPENTIN 300 MG/1
CAPSULE ORAL
Qty: 30 CAPSULE | Refills: 3 | Status: SHIPPED | OUTPATIENT
Start: 2022-03-30 | End: 2022-06-29

## 2022-03-30 RX ORDER — DICYCLOMINE HCL 20 MG
TABLET ORAL
Qty: 120 TABLET | Refills: 2 | Status: SHIPPED | OUTPATIENT
Start: 2022-03-30 | End: 2022-05-31 | Stop reason: SDUPTHER

## 2022-03-30 RX ORDER — LANOLIN ALCOHOL/MO/W.PET/CERES
3 CREAM (GRAM) TOPICAL DAILY
Qty: 90 TABLET | Refills: 1 | Status: SHIPPED | OUTPATIENT
Start: 2022-03-30 | End: 2022-09-18 | Stop reason: SDUPTHER

## 2022-03-30 RX ORDER — ALBUTEROL SULFATE 90 UG/1
2 AEROSOL, METERED RESPIRATORY (INHALATION) DAILY PRN
Qty: 1 EACH | Refills: 2 | Status: SHIPPED | OUTPATIENT
Start: 2022-03-30 | End: 2022-09-18 | Stop reason: SDUPTHER

## 2022-03-30 RX ORDER — EPINEPHRINE 0.3 MG/.3ML
0.3 INJECTION SUBCUTANEOUS ONCE
Qty: 0.3 ML | Refills: 0 | Status: SHIPPED | OUTPATIENT
Start: 2022-03-30 | End: 2022-03-30

## 2022-03-30 RX ORDER — OXYBUTYNIN CHLORIDE 10 MG/1
10 TABLET, EXTENDED RELEASE ORAL 2 TIMES DAILY
Qty: 60 TABLET | Refills: 3 | Status: SHIPPED | OUTPATIENT
Start: 2022-03-30 | End: 2022-05-31 | Stop reason: SDUPTHER

## 2022-03-30 RX ORDER — PANTOPRAZOLE SODIUM 40 MG/1
40 TABLET, DELAYED RELEASE ORAL 2 TIMES DAILY
Qty: 60 TABLET | Refills: 5 | Status: SHIPPED | OUTPATIENT
Start: 2022-03-30 | End: 2022-09-18 | Stop reason: SDUPTHER

## 2022-03-30 ASSESSMENT — ENCOUNTER SYMPTOMS
SHORTNESS OF BREATH: 0
ABDOMINAL PAIN: 0

## 2022-03-30 NOTE — PROGRESS NOTES
3/30/2022    This is a 33 y.o. female   Chief Complaint   Patient presents with    Hearing Loss     lt ear. x 2weeks. went too deep with q-tip. tinnitis    Asthma     f/u. doing better    Other     needs epi pen. heartburn has been better with double old rx. needs new rx   . HPI    Mood disorder-  Patient is being seen at [facility]. Stable on medications    Surekha Coyne- has been taking pantoprazole 40 mg twice daily and this controls her symptoms well. No longer taking prilosec    Asthma- Taking advair twice/day and  uses rescue inhaler a few times/week. Feels asthma is well controlled at this time. Hypothyroidism- stable on current meds. Would like to get off meds in future. Allergic rhinitis- flaring up with weather, not taking any medications \"not bad enough\"    Shell fish allergy- recently exposed to shell fish and her tongue swelled up- requesting an epi pen.     Urgency incontinence- stable on oxybutin    RLS- stable on gabapentin    Muffled hearing- since clearing ears out with q-tip recently, worse in right ear     Patient Active Problem List   Diagnosis    GERD (gastroesophageal reflux disease)    Irritable bowel    Insomnia    ADHD (attention deficit hyperactivity disorder)    Allergic rhinitis    Needs flu shot    Dysmenorrhea    Menorrhagia    PMS (premenstrual syndrome)    Anxiety    Tinea versicolor    Atypical depression    Herpes stomatitis    Hypothyroidism    Moderate persistent asthma without complication    Prediabetes    Moderate episode of recurrent major depressive disorder (HCC)    MARV (obstructive sleep apnea)    PLMD (periodic limb movement disorder)    Vitamin D deficiency    Obstructive sleep apnea, adult    Chronic GERD    Bipolar I disorder, most recent episode (or current) depressed (HCC)    Urge incontinence    Urinary retention    Simple chronic bronchitis (HCC)          Current Outpatient Medications   Medication Sig Dispense Refill    lurasidone (LATUDA) 20 MG TABS tablet Take 20 mg by mouth daily      fluticasone-salmeterol (ADVAIR DISKUS) 100-50 MCG/DOSE diskus inhaler INHALE ONE PUFF BY MOUTH TWICE A DAY 1 each 1    albuterol sulfate HFA (VENTOLIN HFA) 108 (90 Base) MCG/ACT inhaler Inhale 2 puffs into the lungs daily as needed for Wheezing or Shortness of Breath 1 each 2    EPINEPHrine (EPIPEN 2-KATIANA) 0.3 MG/0.3ML SOAJ injection Inject 0.3 mLs into the muscle once for 1 dose Use as directed for allergic reaction 0.3 mL 0    dicyclomine (BENTYL) 20 MG tablet TAKE ONE TABLET BY MOUTH FOUR TIMES A  tablet 2    gabapentin (NEURONTIN) 300 MG capsule TAKE 1 CAPSULES BY MOUTH ONCE NIGHTLY 30 capsule 3    levothyroxine (SYNTHROID) 25 MCG tablet TAKE ONE TABLET BY MOUTH DAILY 30 tablet 5    oxybutynin (DITROPAN-XL) 10 MG extended release tablet Take 1 tablet by mouth 2 times daily 60 tablet 3    pantoprazole (PROTONIX) 40 MG tablet Take 1 tablet by mouth 2 times daily 60 tablet 5    melatonin 3 MG TABS tablet Take 1 tablet by mouth daily 90 tablet 1    lamoTRIgine  MG TB24 TAKE ONE TABLET BY MOUTH ONCE NIGHTLY 30 tablet 1    busPIRone (BUSPAR) 5 MG tablet       hydrOXYzine (ATARAX) 25 MG tablet       albuterol (PROVENTIL) (2.5 MG/3ML) 0.083% nebulizer solution Take 3 mLs by nebulization every 6 hours as needed for Wheezing 50 each 1     No current facility-administered medications for this visit. Allergies   Allergen Reactions    Latex Swelling and Rash    Kiwi Extract Shortness Of Breath    Shellfish-Derived Products Swelling, Rash and Other (See Comments)     Tongue swelled up.  Tramadol Other (See Comments)     Hallucinations.  Zoloft [Sertraline Hcl]      overstim    Fluoxetine Nausea Only    Wellbutrin [Bupropion Hcl] Nausea Only       Review of Systems   Constitutional: Negative for activity change and fever. HENT: Positive for tinnitus.     Respiratory: Negative for shortness of breath. Cardiovascular: Negative for chest pain, palpitations and leg swelling. Gastrointestinal: Negative for abdominal pain. Neurological: Negative for dizziness and headaches. Vitals:    03/30/22 1354   BP: 124/72   Site: Right Upper Arm   Position: Sitting   Cuff Size: Large Adult   Pulse: 105   Resp: 16   Temp: 98.4 °F (36.9 °C)   TempSrc: Oral   SpO2: 97%   Weight: 201 lb 9.6 oz (91.4 kg)   Height: 5' 6\" (1.676 m)       Body mass index is 32.54 kg/m². Wt Readings from Last 3 Encounters:   03/30/22 201 lb 9.6 oz (91.4 kg)   12/15/21 213 lb (96.6 kg)   11/11/21 213 lb 8 oz (96.8 kg)       BP Readings from Last 3 Encounters:   03/30/22 124/72   12/15/21 114/74   11/11/21 130/61       Physical Exam  Vitals reviewed. Constitutional:       General: She is not in acute distress. Appearance: Normal appearance. She is obese. HENT:      Head: Normocephalic and atraumatic. Right Ear: External ear normal. There is impacted cerumen. Left Ear: Tympanic membrane, ear canal and external ear normal.   Cardiovascular:      Rate and Rhythm: Normal rate and regular rhythm. Heart sounds: Normal heart sounds. No murmur heard. No friction rub. No gallop. Pulmonary:      Effort: Pulmonary effort is normal. No respiratory distress. Breath sounds: Normal breath sounds. Abdominal:      Palpations: Abdomen is soft. Tenderness: There is no abdominal tenderness. Musculoskeletal:         General: Normal range of motion. Right lower leg: No edema. Left lower leg: No edema. Skin:     General: Skin is warm and dry. Neurological:      Mental Status: She is oriented to person, place, and time. Psychiatric:         Behavior: Behavior normal.         Thought Content: Thought content normal.         Judgment: Judgment normal.          Rayshawn Collazo was seen today for hearing loss, asthma and other.     Diagnoses and all orders for this visit:    Gastroesophageal reflux disease without esophagitis  Stable on 40 mg pantoprazole BID  -     pantoprazole (PROTONIX) 40 MG tablet; Take 1 tablet by mouth 2 times daily    Moderate persistent asthma without complication  stable  -     fluticasone-salmeterol (ADVAIR DISKUS) 100-50 MCG/DOSE diskus inhaler; INHALE ONE PUFF BY MOUTH TWICE A DAY  -     albuterol sulfate HFA (VENTOLIN HFA) 108 (90 Base) MCG/ACT inhaler; Inhale 2 puffs into the lungs daily as needed for Wheezing or Shortness of Breath    Acquired hypothyroidism  stable  -     TSH; Future  -     levothyroxine (SYNTHROID) 25 MCG tablet; TAKE ONE TABLET BY MOUTH DAILY    Shellfish allergy  -     EPINEPHrine (EPIPEN 2-KATIANA) 0.3 MG/0.3ML SOAJ injection; Inject 0.3 mLs into the muscle once for 1 dose Use as directed for allergic reaction    Vitamin D deficiency  stable  -     Vitamin D 25 Hydroxy; Future    Allergic rhinitis, unspecified seasonality, unspecified trigger  stable    Obesity, Class I, BMI 30-34.9  Discussed healthy lifestyle habits including low carb/low fat diet and importance of aerobic exercise  -     Hemoglobin A1C; Future  -     Comprehensive Metabolic Panel; Future    Irritable bowel syndrome with diarrhea  stable  -     dicyclomine (BENTYL) 20 MG tablet; TAKE ONE TABLET BY MOUTH FOUR TIMES A DAY    Restless legs syndrome (RLS)  stable  -     gabapentin (NEURONTIN) 300 MG capsule; TAKE 1 CAPSULES BY MOUTH ONCE NIGHTLY    Urgency incontinence  stable  -     oxybutynin (DITROPAN-XL) 10 MG extended release tablet; Take 1 tablet by mouth 2 times daily    Impacted cerumen of right ear  -     SD REMOVAL IMPACTED CERUMEN IRRIGATION/LVG UNILAT  Large amount of dark wax was removed by syringing and manual debridement. Patient tolerated procedure well. TM normal after procedure. Instructions for home care to prevent wax buildup were given. Insomnia, unspecified type  stable  -     melatonin 3 MG TABS tablet;  Take 1 tablet by mouth daily        Return in about 6 months (around 9/30/2022) for Asthma.

## 2022-03-31 RX ORDER — LAMOTRIGINE 300 MG/1
TABLET, EXTENDED RELEASE ORAL
Qty: 30 TABLET | Refills: 1 | Status: SHIPPED | OUTPATIENT
Start: 2022-03-31

## 2022-04-05 ENCOUNTER — TELEMEDICINE (OUTPATIENT)
Dept: BEHAVIORAL/MENTAL HEALTH CLINIC | Age: 32
End: 2022-04-05
Payer: COMMERCIAL

## 2022-04-05 DIAGNOSIS — F32.A DEPRESSIVE DISORDER: Primary | ICD-10-CM

## 2022-04-05 DIAGNOSIS — F43.10 PTSD (POST-TRAUMATIC STRESS DISORDER): ICD-10-CM

## 2022-04-05 PROCEDURE — 90837 PSYTX W PT 60 MINUTES: CPT

## 2022-04-05 PROCEDURE — 1036F TOBACCO NON-USER: CPT

## 2022-04-12 ENCOUNTER — TELEMEDICINE (OUTPATIENT)
Dept: BEHAVIORAL/MENTAL HEALTH CLINIC | Age: 32
End: 2022-04-12
Payer: COMMERCIAL

## 2022-04-12 DIAGNOSIS — F41.9 ANXIETY: ICD-10-CM

## 2022-04-12 DIAGNOSIS — F43.10 PTSD (POST-TRAUMATIC STRESS DISORDER): Primary | ICD-10-CM

## 2022-04-12 PROCEDURE — 90837 PSYTX W PT 60 MINUTES: CPT

## 2022-04-12 PROCEDURE — 1036F TOBACCO NON-USER: CPT
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
## 2022-04-19 DIAGNOSIS — E03.9 ACQUIRED HYPOTHYROIDISM: ICD-10-CM

## 2022-04-19 DIAGNOSIS — E55.9 VITAMIN D DEFICIENCY: ICD-10-CM

## 2022-04-19 DIAGNOSIS — E66.9 OBESITY, CLASS I, BMI 30-34.9: ICD-10-CM

## 2022-04-19 LAB
A/G RATIO: 2.1 (ref 1.1–2.2)
ALBUMIN SERPL-MCNC: 4.8 G/DL (ref 3.4–5)
ALP BLD-CCNC: 79 U/L (ref 40–129)
ALT SERPL-CCNC: 14 U/L (ref 10–40)
ANION GAP SERPL CALCULATED.3IONS-SCNC: 12 MMOL/L (ref 3–16)
AST SERPL-CCNC: 13 U/L (ref 15–37)
BILIRUB SERPL-MCNC: 0.3 MG/DL (ref 0–1)
BUN BLDV-MCNC: 12 MG/DL (ref 7–20)
CALCIUM SERPL-MCNC: 9.4 MG/DL (ref 8.3–10.6)
CHLORIDE BLD-SCNC: 100 MMOL/L (ref 99–110)
CO2: 25 MMOL/L (ref 21–32)
CREAT SERPL-MCNC: 0.7 MG/DL (ref 0.6–1.1)
GFR AFRICAN AMERICAN: >60
GFR NON-AFRICAN AMERICAN: >60
GLUCOSE BLD-MCNC: 111 MG/DL (ref 70–99)
POTASSIUM SERPL-SCNC: 4.7 MMOL/L (ref 3.5–5.1)
SODIUM BLD-SCNC: 137 MMOL/L (ref 136–145)
TOTAL PROTEIN: 7.1 G/DL (ref 6.4–8.2)
TSH SERPL DL<=0.05 MIU/L-ACNC: 1.28 UIU/ML (ref 0.27–4.2)
VITAMIN D 25-HYDROXY: 22.8 NG/ML

## 2022-04-20 LAB
ESTIMATED AVERAGE GLUCOSE: 111.2 MG/DL
HBA1C MFR BLD: 5.5 %

## 2022-04-22 ENCOUNTER — TELEMEDICINE (OUTPATIENT)
Dept: BEHAVIORAL/MENTAL HEALTH CLINIC | Age: 32
End: 2022-04-22
Payer: COMMERCIAL

## 2022-04-22 DIAGNOSIS — F32.A DEPRESSIVE DISORDER: Primary | ICD-10-CM

## 2022-04-22 DIAGNOSIS — F43.10 PTSD (POST-TRAUMATIC STRESS DISORDER): ICD-10-CM

## 2022-04-22 PROCEDURE — 90837 PSYTX W PT 60 MINUTES: CPT

## 2022-04-22 PROCEDURE — 1036F TOBACCO NON-USER: CPT

## 2022-04-29 ENCOUNTER — TELEMEDICINE (OUTPATIENT)
Dept: BEHAVIORAL/MENTAL HEALTH CLINIC | Age: 32
End: 2022-04-29
Payer: COMMERCIAL

## 2022-04-29 DIAGNOSIS — F43.10 PTSD (POST-TRAUMATIC STRESS DISORDER): ICD-10-CM

## 2022-04-29 DIAGNOSIS — F32.A DEPRESSIVE DISORDER: Primary | ICD-10-CM

## 2022-04-29 PROCEDURE — 90837 PSYTX W PT 60 MINUTES: CPT

## 2022-04-29 PROCEDURE — 1036F TOBACCO NON-USER: CPT
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
## 2022-05-10 ENCOUNTER — OFFICE VISIT (OUTPATIENT)
Dept: BEHAVIORAL/MENTAL HEALTH CLINIC | Age: 32
End: 2022-05-10
Payer: COMMERCIAL

## 2022-05-10 DIAGNOSIS — F43.10 PTSD (POST-TRAUMATIC STRESS DISORDER): Primary | ICD-10-CM

## 2022-05-10 PROCEDURE — 90837 PSYTX W PT 60 MINUTES: CPT

## 2022-05-10 PROCEDURE — 1036F TOBACCO NON-USER: CPT

## 2022-05-17 ENCOUNTER — TELEMEDICINE (OUTPATIENT)
Dept: BEHAVIORAL/MENTAL HEALTH CLINIC | Age: 32
End: 2022-05-17
Payer: COMMERCIAL

## 2022-05-17 DIAGNOSIS — F43.10 PTSD (POST-TRAUMATIC STRESS DISORDER): Primary | ICD-10-CM

## 2022-05-17 PROCEDURE — 90837 PSYTX W PT 60 MINUTES: CPT

## 2022-05-17 PROCEDURE — 1036F TOBACCO NON-USER: CPT
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
## 2022-05-24 ENCOUNTER — OFFICE VISIT (OUTPATIENT)
Dept: BEHAVIORAL/MENTAL HEALTH CLINIC | Age: 32
End: 2022-05-24
Payer: COMMERCIAL

## 2022-05-24 DIAGNOSIS — F43.10 PTSD (POST-TRAUMATIC STRESS DISORDER): Primary | ICD-10-CM

## 2022-05-24 PROCEDURE — 1036F TOBACCO NON-USER: CPT

## 2022-05-24 PROCEDURE — 90837 PSYTX W PT 60 MINUTES: CPT

## 2022-05-31 DIAGNOSIS — N39.41 URGENCY INCONTINENCE: ICD-10-CM

## 2022-05-31 DIAGNOSIS — K58.0 IRRITABLE BOWEL SYNDROME WITH DIARRHEA: ICD-10-CM

## 2022-06-01 ENCOUNTER — TELEPHONE (OUTPATIENT)
Dept: FAMILY MEDICINE CLINIC | Age: 32
End: 2022-06-01

## 2022-06-01 ENCOUNTER — OFFICE VISIT (OUTPATIENT)
Dept: BEHAVIORAL/MENTAL HEALTH CLINIC | Age: 32
End: 2022-06-01
Payer: COMMERCIAL

## 2022-06-01 DIAGNOSIS — F43.10 PTSD (POST-TRAUMATIC STRESS DISORDER): Primary | ICD-10-CM

## 2022-06-01 DIAGNOSIS — H91.90 HEARING LOSS, UNSPECIFIED HEARING LOSS TYPE, UNSPECIFIED LATERALITY: Primary | ICD-10-CM

## 2022-06-01 PROCEDURE — 1036F TOBACCO NON-USER: CPT

## 2022-06-01 PROCEDURE — 90837 PSYTX W PT 60 MINUTES: CPT

## 2022-06-01 RX ORDER — OXYBUTYNIN CHLORIDE 10 MG/1
10 TABLET, EXTENDED RELEASE ORAL 2 TIMES DAILY
Qty: 60 TABLET | Refills: 3 | Status: SHIPPED | OUTPATIENT
Start: 2022-06-01 | End: 2022-09-27

## 2022-06-01 RX ORDER — DICYCLOMINE HCL 20 MG
TABLET ORAL
Qty: 120 TABLET | Refills: 2 | Status: SHIPPED | OUTPATIENT
Start: 2022-06-01
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
## 2022-06-01 NOTE — TELEPHONE ENCOUNTER
At last visit with Amparo Solis back in March  She talk to her about her ears  Her right ear is better and left ear she can not hear out of  Doesn't feel clogged but just feels like she can not hear out of it   Feel like it needs to pop  They spoke about and ENT and she was told to call back if her ears have not gotten any better  She would like a referral for an ENT  Has no preference on who she sees- just someone with in [facility]    Please advise

## 2022-06-07 ENCOUNTER — OFFICE VISIT (OUTPATIENT)
Dept: BEHAVIORAL/MENTAL HEALTH CLINIC | Age: 32
End: 2022-06-07
Payer: COMMERCIAL

## 2022-06-07 DIAGNOSIS — F43.10 PTSD (POST-TRAUMATIC STRESS DISORDER): Primary | ICD-10-CM

## 2022-06-07 PROCEDURE — 90837 PSYTX W PT 60 MINUTES: CPT

## 2022-06-07 PROCEDURE — 1036F TOBACCO NON-USER: CPT
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
## 2022-06-21 ENCOUNTER — OFFICE VISIT (OUTPATIENT)
Dept: BEHAVIORAL/MENTAL HEALTH CLINIC | Age: 32
End: 2022-06-21
Payer: COMMERCIAL

## 2022-06-21 DIAGNOSIS — F43.10 PTSD (POST-TRAUMATIC STRESS DISORDER): Primary | ICD-10-CM

## 2022-06-21 PROCEDURE — 1036F TOBACCO NON-USER: CPT

## 2022-06-21 PROCEDURE — 90837 PSYTX W PT 60 MINUTES: CPT
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
## 2022-06-28 ENCOUNTER — TELEMEDICINE (OUTPATIENT)
Dept: BEHAVIORAL/MENTAL HEALTH CLINIC | Age: 32
End: 2022-06-28
Payer: COMMERCIAL

## 2022-06-28 DIAGNOSIS — F43.10 PTSD (POST-TRAUMATIC STRESS DISORDER): Primary | ICD-10-CM

## 2022-06-28 PROCEDURE — 1036F TOBACCO NON-USER: CPT

## 2022-06-28 PROCEDURE — 90834 PSYTX W PT 45 MINUTES: CPT

## 2022-06-29 DIAGNOSIS — G25.81 RESTLESS LEGS SYNDROME (RLS): ICD-10-CM

## 2022-06-29 RX ORDER — GABAPENTIN 300 MG/1
CAPSULE ORAL
Qty: 30 CAPSULE | Refills: 3 | Status: SHIPPED | OUTPATIENT
Start: 2022-06-29 | End: 2022-09-18 | Stop reason: SDUPTHER
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
## 2022-07-02 ENCOUNTER — HOSPITAL ENCOUNTER (EMERGENCY)
Age: 32
Discharge: HOME | End: 2022-07-03
Attending: EMERGENCY MEDICINE
Payer: COMMERCIAL

## 2022-07-02 DIAGNOSIS — R07.9 CHEST PAIN, UNSPECIFIED TYPE: Primary | ICD-10-CM

## 2022-07-02 PROCEDURE — 85025 COMPLETE CBC W/AUTO DIFF WBC: CPT

## 2022-07-02 PROCEDURE — 80053 COMPREHEN METABOLIC PANEL: CPT

## 2022-07-02 PROCEDURE — 84484 ASSAY OF TROPONIN QUANT: CPT

## 2022-07-02 PROCEDURE — 81003 URINALYSIS AUTO W/O SCOPE: CPT

## 2022-07-02 PROCEDURE — 93005 ELECTROCARDIOGRAM TRACING: CPT

## 2022-07-02 PROCEDURE — 99284 EMERGENCY DEPT VISIT MOD MDM: CPT

## 2022-07-02 PROCEDURE — 36415 COLL VENOUS BLD VENIPUNCTURE: CPT

## 2022-07-03 ENCOUNTER — APPOINTMENT (OUTPATIENT)
Dept: GENERAL RADIOLOGY | Age: 32
End: 2022-07-03
Payer: COMMERCIAL

## 2022-07-03 VITALS
HEART RATE: 82 BPM | DIASTOLIC BLOOD PRESSURE: 88 MMHG | SYSTOLIC BLOOD PRESSURE: 128 MMHG | RESPIRATION RATE: 16 BRPM | OXYGEN SATURATION: 97 % | TEMPERATURE: 98.1 F

## 2022-07-03 LAB
A/G RATIO: 1.6 (ref 1.1–2.2)
ALBUMIN SERPL-MCNC: 4.3 G/DL (ref 3.4–5)
ALP BLD-CCNC: 71 U/L (ref 40–129)
ALT SERPL-CCNC: 11 U/L (ref 10–40)
ANION GAP SERPL CALCULATED.3IONS-SCNC: 13 MMOL/L (ref 3–16)
AST SERPL-CCNC: 13 U/L (ref 15–37)
BASOPHILS ABSOLUTE: 0.1 K/UL (ref 0–0.2)
BASOPHILS RELATIVE PERCENT: 0.7 %
BILIRUB SERPL-MCNC: <0.2 MG/DL (ref 0–1)
BILIRUBIN URINE: NEGATIVE
BLOOD, URINE: NEGATIVE
BUN BLDV-MCNC: 9 MG/DL (ref 7–20)
CALCIUM SERPL-MCNC: 9.3 MG/DL (ref 8.3–10.6)
CHLORIDE BLD-SCNC: 103 MMOL/L (ref 99–110)
CLARITY: CLEAR
CO2: 24 MMOL/L (ref 21–32)
COLOR: YELLOW
CREAT SERPL-MCNC: 0.7 MG/DL (ref 0.6–1.1)
EOSINOPHILS ABSOLUTE: 0.1 K/UL (ref 0–0.6)
EOSINOPHILS RELATIVE PERCENT: 1.6 %
GFR AFRICAN AMERICAN: >60
GFR NON-AFRICAN AMERICAN: >60
GLUCOSE BLD-MCNC: 119 MG/DL (ref 70–99)
GLUCOSE URINE: NEGATIVE MG/DL
HCT VFR BLD CALC: 40.6 % (ref 36–48)
HEMOGLOBIN: 13.8 G/DL (ref 12–16)
KETONES, URINE: ABNORMAL MG/DL
LEUKOCYTE ESTERASE, URINE: NEGATIVE
LYMPHOCYTES ABSOLUTE: 3.1 K/UL (ref 1–5.1)
LYMPHOCYTES RELATIVE PERCENT: 34.1 %
MCH RBC QN AUTO: 28.7 PG (ref 26–34)
MCHC RBC AUTO-ENTMCNC: 33.9 G/DL (ref 31–36)
MCV RBC AUTO: 84.5 FL (ref 80–100)
MICROSCOPIC EXAMINATION: ABNORMAL
MONOCYTES ABSOLUTE: 0.6 K/UL (ref 0–1.3)
MONOCYTES RELATIVE PERCENT: 6.8 %
NEUTROPHILS ABSOLUTE: 5.1 K/UL (ref 1.7–7.7)
NEUTROPHILS RELATIVE PERCENT: 56.8 %
NITRITE, URINE: NEGATIVE
PDW BLD-RTO: 13.2 % (ref 12.4–15.4)
PH UA: 6 (ref 5–8)
PLATELET # BLD: 247 K/UL (ref 135–450)
PMV BLD AUTO: 8.6 FL (ref 5–10.5)
POTASSIUM REFLEX MAGNESIUM: 3.8 MMOL/L (ref 3.5–5.1)
PROTEIN UA: NEGATIVE MG/DL
RBC # BLD: 4.8 M/UL (ref 4–5.2)
SODIUM BLD-SCNC: 140 MMOL/L (ref 136–145)
SPECIFIC GRAVITY UA: 1.02 (ref 1–1.03)
TOTAL PROTEIN: 7 G/DL (ref 6.4–8.2)
TROPONIN: <0.01 NG/ML
TROPONIN: <0.01 NG/ML
URINE REFLEX TO CULTURE: ABNORMAL
URINE TYPE: ABNORMAL
UROBILINOGEN, URINE: 0.2 E.U./DL
WBC # BLD: 9.1 K/UL (ref 4–11)

## 2022-07-03 PROCEDURE — 36415 COLL VENOUS BLD VENIPUNCTURE: CPT

## 2022-07-03 PROCEDURE — 84484 ASSAY OF TROPONIN QUANT: CPT

## 2022-07-03 PROCEDURE — 71046 X-RAY EXAM CHEST 2 VIEWS: CPT

## 2022-07-03 ASSESSMENT — PAIN SCALES - GENERAL: PAINLEVEL_OUTOF10: 0

## 2022-07-03 ASSESSMENT — HEART SCORE: ECG: 1

## 2022-07-03 ASSESSMENT — PAIN - FUNCTIONAL ASSESSMENT: PAIN_FUNCTIONAL_ASSESSMENT: 0-10

## 2022-07-03 NOTE — ED PROVIDER NOTES
[facility] Emergency Department      Pt Name: Cassie Bright  MRN: [id number]  [place] 1990  Date of evaluation: 2022  Provider: Richa Oliveira MD  [address] [place] [address]  Chief Complaint   Patient presents with    Chest Pain     HPI  Cassie Bright is a 33 y.o. female who presents because of chest discomfort. Pain character is sharp. It is right-sided. She noticed about 9 PM tonight. She was just walking. Pain radiated up the right side of her neck. She denies any nausea or vomiting tonight but did have an episode yesterday where she vomited. Denies any cough or congestion. Denies any abdominal pain. Has no known history of heart disease but her father had a heart attack at the age of 28. He is currently  and lived to his mid 62s. Does report having rearranged some furniture in her kids bedroom a couple days ago. Pain is worse when she moves her arm certain ways. REVIEW OF SYSTEMS:  No fever, no abdominal pain, no trauma, no recent travel, no swelling Pertinent positives and negatives as per the HPI. All other review of systems reviewed and negative. Nursing notes reviewed. PAST MEDICAL HISTORY  Past Medical History:   Diagnosis Date    Abnormal Pap smear     Colposcopy; HPV.  Abnormal Pap smear of cervix     Colposcopy positive for HPV.  Abnormal umbilical cord     peripheral cord insertion. monitor growth.  ADHD (attention deficit hyperactivity disorder)     no meds currently.  Anxiety     Currently taking Fluoxitine    Asthma     uses daily inhaler and rescue inhaler PRN.  Bipolar 1 disorder (HCC)     was taking prozac early in pregnancy. switched to Quetiapine, Class C, consider weaning in 3rd trimester.     Chronic GERD     Diabetes mellitus (HCC)     GDM-insulin dependent    Endometriosis     Febrile convulsions (simple), unspecified     Febrile seizure as an infant one time    History of chicken pox 89    HPV in female     Irritable bowel     Obstructive sleep apnea, adult     Positive PPD, treated     negative chest xray.  Postpartum depression     Pre-eclampsia in third trimester 2012    with G1. Baseline PIH labs and 24 hour urine WNL with G2.    Prediabetes 2018    Thyroid disease     Hypothyroid. taking Synthroid.  Vertigo      SURGICAL HISTORY  Past Surgical History:   Procedure Laterality Date    BREAST SURGERY      reduction     SECTION      LTCS for suspected macrosomia and PIH. MEDICATIONS:  No current facility-administered medications on file prior to encounter.      Current Outpatient Medications on File Prior to Encounter   Medication Sig Dispense Refill    gabapentin (NEURONTIN) 300 MG capsule TAKE ONE CAPSULE BY MOUTH ONCE NIGHTLY 30 capsule 3    oxybutynin (DITROPAN-XL) 10 mg extended release tablet Take 1 tablet by mouth 2 times daily 60 tablet 3    dicyclomine (BENTYL) 20 MG tablet TAKE ONE TABLET BY MOUTH FOUR TIMES A  tablet 2    lamoTRIgine  MG TB24 TAKE ONE TABLET BY MOUTH ONCE NIGHTLY 30 tablet 1    lurasidone (LATUDA) 20 MG TABS tablet Take 20 mg by mouth daily      fluticasone-salmeterol (ADVAIR DISKUS) 100-50 MCG/DOSE diskus inhaler INHALE ONE PUFF BY MOUTH TWICE A DAY 1 each 1    albuterol sulfate HFA (VENTOLIN HFA) 108 (90 Base) MCG/ACT inhaler Inhale 2 puffs into the lungs daily as needed for Wheezing or Shortness of Breath 1 each 2    EPINEPHrine (EPIPEN 2-KATIANA) 0.3 MG/0.3ML SOAJ injection Inject 0.3 mLs into the muscle once for 1 dose Use as directed for allergic reaction 0.3 mL 0    levothyroxine (SYNTHROID) 25 MCG tablet TAKE ONE TABLET BY MOUTH DAILY 30 tablet 5    pantoprazole (PROTONIX) 40 MG tablet Take 1 tablet by mouth 2 times daily 60 tablet 5    melatonin 3 MG TABS tablet Take 1 tablet by mouth daily 90 tablet 1    busPIRone (BUSPAR) 5 MG tablet       hydrOXYzine (ATARAX) 25 MG tablet       albuterol (PROVENTIL) (2.5 MG/3ML) 0.083% nebulizer solution Take 3 mLs by nebulization every 6 hours as needed for Wheezing 50 each 1     ALLERGIES  Latex, Kiwi extract, Shellfish-derived products, Tramadol, Zoloft [sertraline hcl], Fluoxetine, and Wellbutrin [bupropion hcl]  FAMILY HISTORY:  Family History   Problem Relation Age of Onset    Diabetes Mother     Alcohol Abuse Mother     Depression Mother     Arthritis Mother     Mental Illness Father         bipolar    Heart Disease Father 43        MI x 3    Hypertension Father     Elevated Lipids Father     Coronary Art Dis Father     Depression Father     COPD Father     Arthritis Father     Cancer Sister         cervical    Depression Sister     Heart Failure Maternal Grandmother     Asthma Maternal Grandmother      SOCIAL HISTORY:  Social History     Tobacco Use    Smoking status: Never Smoker    Smokeless tobacco: Never Used    Tobacco comment: congratulated on nonsmoking status. Vaping Use    Vaping Use: Never used   Substance Use Topics    Alcohol use: No     Alcohol/week: 0.0 standard drinks    Drug use: No     IMMUNIZATIONS:  Noncontributory    PHYSICAL EXAM  VITAL SIGNS:  BP (!) 144/95   Pulse 81   Resp 19   SpO2 97%   Constitutional:  33 y.o. female who does not appear toxic  HENT:  Atraumatic, mucous membranes moist  Eyes:   Conjunctiva clear, no icterus  Neck:  Supple, no adenopathy, no visible JVD  Cardiovascular:  Regular, no rubs  Thorax & Lungs:  No accessory muscle usage, clear, mild right sided CW tenderness  Abdomen:  Soft, non distended, no pulsatility or bruits, bowel sounds present, no tenderness  Back:  No deformity  Genitalia:  Deferred  Rectal:  Deferred  Extremities:  No cyanosis, radial pulses symmetric, no edema   Skin:  Warm, dry  Neurologic:  Alert, no slurred speech, no focal deficits noted  Psychiatric:  Affect appropriate    DIAGNOSTIC RESULTS:  Labs resulted at the time of this note reviewed.   Labs Reviewed   COMPREHENSIVE METABOLIC PANEL W/ REFLEX TO MG FOR LOW K - Abnormal; Notable for the following components:       Result Value    Glucose 119 (*)     AST 13 (*)     All other components within normal limits   URINALYSIS WITH REFLEX TO CULTURE - Abnormal; Notable for the following components:    Ketones, Urine TRACE (*)     All other components within normal limits   TROPONIN   CBC WITH AUTO DIFFERENTIAL   TROPONIN     EKG:  Read by me in the absence of a cardiologist shows: Sinus rhythm, rate 77, short NC, poor R wave progression, no acute injury pattern, no prior EKG available    RADIOLOGY:    Plain x-rays were viewed by me:   XR CHEST (2 VW)   Final Result   No evidence of acute cardiopulmonary disease. ED COURSE:  Uneventful     PROCEDURES:  None    CRITICAL CARE:  None    CONSULTATIONS:  None     MEDICAL DECISION MAKING: Elo Jane is a 33 y.o. female who presented because of chest discomfort. We did two sets of delayed cardiac markers, both of which are negative. Due to risk factors, we do recommend that she get a cardiac stress test in the very near future. The patient's history and evaluation suggests a less emergent etiology for the discomfort. I do not believe the patient is experiencing symptoms from acute coronary syndrome, aortic dissection, pulmonary embolism, pneumothorax, myocarditis, Boerhaave syndrome, pericardial tamponade, acute abdomen, amongst other emergencies. Elo Jane was given appropriate discharge instructions. Referral to follow up provider.      Heart Score for chest pain patients  History: Slightly Suspicious  ECG: Non-Specifc repolarization disturbance/LBTB/PM  Patient Age: < 45 years  *Risk factors for Atherosclerotic disease: Positive family History,Obesity  Risk Factors: 1 or 2 risk factors  Troponin: < 1X normal limit  Heart Score Total: 2     FOLLOW UP:    Sulaiman Rey MD  [address] [place] [address]  [phone]    Schedule an appointment as soon as possible for a visit       Janelle Felix [facility] Emergency Department  [address] [address]  [phone]  Go to   If symptoms worsen    FINAL IMPRESSION:    1. Chest pain, unspecified type        (Please note that I used voice recognition software to generate this note.   Occasionally words are mistranscribed despite my efforts to edit errors.)        Denilson Sheffield MD  07/03/22 0620
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
## 2022-07-05 ENCOUNTER — OFFICE VISIT (OUTPATIENT)
Dept: BEHAVIORAL/MENTAL HEALTH CLINIC | Age: 32
End: 2022-07-05
Payer: COMMERCIAL

## 2022-07-05 ENCOUNTER — CASE MANAGEMENT (OUTPATIENT)
Dept: BEHAVIORAL/MENTAL HEALTH CLINIC | Age: 32
End: 2022-07-05

## 2022-07-05 DIAGNOSIS — F32.89 OTHER DEPRESSION: Primary | ICD-10-CM

## 2022-07-05 LAB
EKG ATRIAL RATE: 77 BPM
EKG DIAGNOSIS: NORMAL
EKG P AXIS: 8 DEGREES
EKG P-R INTERVAL: 108 MS
EKG Q-T INTERVAL: 378 MS
EKG QRS DURATION: 80 MS
EKG QTC CALCULATION (BAZETT): 427 MS
EKG R AXIS: 30 DEGREES
EKG T AXIS: 19 DEGREES
EKG VENTRICULAR RATE: 77 BPM

## 2022-07-05 PROCEDURE — 90834 PSYTX W PT 45 MINUTES: CPT

## 2022-07-05 PROCEDURE — 1036F TOBACCO NON-USER: CPT

## 2022-07-05 NOTE — PROGRESS NOTES
Sending Barb portillo for her own interests in personal, human and family development    [email or web address]-[email or web address]-[email or web address]&[id number]&ei=[email or web address]&oq=eriksons+developmental+tasks&gs_lcp=ChNtb2JpbGUtZ3dzLXdpei1zZXJwEAMyBwgjELECECcyBAgAEAoyBAgAEAoyBAgAEAoyBAgAEAoyBAgAEAoyBQgAEIYDMgUIABCGAzoHCAAQRxCwAzoGCAAQHhAHOiIIABCnBBBDEIsDEPMDEKkEEPQDEPUDEPYDEPcDEPgDEPkDOgUIABCABDoECAAQQzobCAAQHhCnBBAHEIsDEPMDEKkEEPQDEPUDEPYDOggIABAeEAgQBzoHCCMQsAIQJzoECCMQJzoECAAQHjoKCAAQHhAHEAUQCkoECEEYAFC_DFjKMmDkPmgBcAB4AIABjQKIAZ0QkgEGMC4xMS4ymAEAoAEByAEIuAECwAEB&sclient=ymvloo-tak-dnb-serp
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
## 2022-07-19 ENCOUNTER — OFFICE VISIT (OUTPATIENT)
Dept: BEHAVIORAL/MENTAL HEALTH CLINIC | Age: 32
End: 2022-07-19
Payer: COMMERCIAL

## 2022-07-19 DIAGNOSIS — F43.10 PTSD (POST-TRAUMATIC STRESS DISORDER): ICD-10-CM

## 2022-07-19 DIAGNOSIS — F32.89 OTHER DEPRESSION: Primary | ICD-10-CM

## 2022-07-19 PROCEDURE — 1036F TOBACCO NON-USER: CPT

## 2022-07-19 PROCEDURE — 90837 PSYTX W PT 60 MINUTES: CPT
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
## 2022-08-02 ENCOUNTER — TELEMEDICINE (OUTPATIENT)
Dept: BEHAVIORAL/MENTAL HEALTH CLINIC | Age: 32
End: 2022-08-02
Payer: COMMERCIAL

## 2022-08-02 DIAGNOSIS — F43.10 PTSD (POST-TRAUMATIC STRESS DISORDER): Primary | ICD-10-CM

## 2022-08-02 PROCEDURE — 1036F TOBACCO NON-USER: CPT

## 2022-08-02 PROCEDURE — 90834 PSYTX W PT 45 MINUTES: CPT
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
## 2022-08-16 ENCOUNTER — OFFICE VISIT (OUTPATIENT)
Dept: BEHAVIORAL/MENTAL HEALTH CLINIC | Age: 32
End: 2022-08-16
Payer: COMMERCIAL

## 2022-08-16 DIAGNOSIS — F43.10 PTSD (POST-TRAUMATIC STRESS DISORDER): Primary | ICD-10-CM

## 2022-08-16 PROCEDURE — 1036F TOBACCO NON-USER: CPT

## 2022-08-16 PROCEDURE — 90837 PSYTX W PT 60 MINUTES: CPT
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
## 2022-08-23 ENCOUNTER — OFFICE VISIT (OUTPATIENT)
Dept: BEHAVIORAL/MENTAL HEALTH CLINIC | Age: 32
End: 2022-08-23
Payer: COMMERCIAL

## 2022-08-23 DIAGNOSIS — F43.10 PTSD (POST-TRAUMATIC STRESS DISORDER): Primary | ICD-10-CM

## 2022-08-23 PROCEDURE — 90837 PSYTX W PT 60 MINUTES: CPT

## 2022-08-23 PROCEDURE — 1036F TOBACCO NON-USER: CPT
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
## 2022-08-30 ENCOUNTER — OFFICE VISIT (OUTPATIENT)
Dept: BEHAVIORAL/MENTAL HEALTH CLINIC | Age: 32
End: 2022-08-30
Payer: COMMERCIAL

## 2022-08-30 DIAGNOSIS — F43.10 PTSD (POST-TRAUMATIC STRESS DISORDER): Primary | ICD-10-CM

## 2022-08-30 PROCEDURE — 1036F TOBACCO NON-USER: CPT

## 2022-08-30 PROCEDURE — 90837 PSYTX W PT 60 MINUTES: CPT
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
## 2022-09-06 ENCOUNTER — TELEMEDICINE (OUTPATIENT)
Dept: BEHAVIORAL/MENTAL HEALTH CLINIC | Age: 32
End: 2022-09-06
Payer: COMMERCIAL

## 2022-09-06 DIAGNOSIS — F32.1 MAJOR DEPRESSIVE DISORDER, SINGLE EPISODE, MODERATE (HCC): Primary | ICD-10-CM

## 2022-09-06 PROCEDURE — 90837 PSYTX W PT 60 MINUTES: CPT

## 2022-09-06 PROCEDURE — 1036F TOBACCO NON-USER: CPT

## 2022-09-13 ENCOUNTER — OFFICE VISIT (OUTPATIENT)
Dept: BEHAVIORAL/MENTAL HEALTH CLINIC | Age: 32
End: 2022-09-13
Payer: COMMERCIAL

## 2022-09-13 DIAGNOSIS — F43.10 PTSD (POST-TRAUMATIC STRESS DISORDER): Primary | ICD-10-CM

## 2022-09-13 PROCEDURE — 1036F TOBACCO NON-USER: CPT

## 2022-09-13 PROCEDURE — 90837 PSYTX W PT 60 MINUTES: CPT
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
## 2022-09-18 DIAGNOSIS — E03.9 ACQUIRED HYPOTHYROIDISM: ICD-10-CM

## 2022-09-18 DIAGNOSIS — G25.81 RESTLESS LEGS SYNDROME (RLS): ICD-10-CM

## 2022-09-18 DIAGNOSIS — K21.9 GASTROESOPHAGEAL REFLUX DISEASE WITHOUT ESOPHAGITIS: ICD-10-CM

## 2022-09-18 DIAGNOSIS — G47.00 INSOMNIA, UNSPECIFIED TYPE: ICD-10-CM

## 2022-09-18 DIAGNOSIS — J45.40 MODERATE PERSISTENT ASTHMA WITHOUT COMPLICATION: ICD-10-CM

## 2022-09-19 RX ORDER — GABAPENTIN 300 MG/1
CAPSULE ORAL
Qty: 30 CAPSULE | Refills: 3 | Status: SHIPPED | OUTPATIENT
Start: 2022-09-19 | End: 2023-01-16

## 2022-09-19 RX ORDER — LEVOTHYROXINE SODIUM 0.03 MG/1
TABLET ORAL
Qty: 30 TABLET | Refills: 5 | Status: SHIPPED | OUTPATIENT
Start: 2022-09-19

## 2022-09-19 RX ORDER — ALBUTEROL SULFATE 90 UG/1
2 AEROSOL, METERED RESPIRATORY (INHALATION) DAILY PRN
Qty: 1 EACH | Refills: 2 | Status: SHIPPED | OUTPATIENT
Start: 2022-09-19

## 2022-09-19 RX ORDER — LANOLIN ALCOHOL/MO/W.PET/CERES
3 CREAM (GRAM) TOPICAL DAILY
Qty: 90 TABLET | Refills: 1 | Status: SHIPPED | OUTPATIENT
Start: 2022-09-19

## 2022-09-19 RX ORDER — PANTOPRAZOLE SODIUM 40 MG/1
40 TABLET, DELAYED RELEASE ORAL 2 TIMES DAILY
Qty: 60 TABLET | Refills: 5 | Status: SHIPPED | OUTPATIENT
Start: 2022-09-19

## 2022-09-27 DIAGNOSIS — N39.41 URGENCY INCONTINENCE: ICD-10-CM

## 2022-09-27 RX ORDER — OXYBUTYNIN CHLORIDE 10 MG/1
TABLET, EXTENDED RELEASE ORAL
Qty: 180 TABLET | Refills: 1 | Status: SHIPPED | OUTPATIENT
Start: 2022-09-27

## 2022-09-28 ENCOUNTER — OFFICE VISIT (OUTPATIENT)
Dept: FAMILY MEDICINE CLINIC | Age: 32
End: 2022-09-28
Payer: COMMERCIAL

## 2022-09-28 VITALS
HEART RATE: 98 BPM | WEIGHT: 196.2 LBS | SYSTOLIC BLOOD PRESSURE: 118 MMHG | TEMPERATURE: 98.3 F | RESPIRATION RATE: 16 BRPM | BODY MASS INDEX: 31.53 KG/M2 | DIASTOLIC BLOOD PRESSURE: 76 MMHG | OXYGEN SATURATION: 96 % | HEIGHT: 66 IN

## 2022-09-28 DIAGNOSIS — M54.6 ACUTE RIGHT-SIDED THORACIC BACK PAIN: ICD-10-CM

## 2022-09-28 DIAGNOSIS — G56.03 BILATERAL CARPAL TUNNEL SYNDROME: ICD-10-CM

## 2022-09-28 DIAGNOSIS — J45.40 MODERATE PERSISTENT ASTHMA WITHOUT COMPLICATION: Primary | ICD-10-CM

## 2022-09-28 DIAGNOSIS — R51.9 FACIAL PAIN, ACUTE: ICD-10-CM

## 2022-09-28 DIAGNOSIS — J30.2 SEASONAL ALLERGIC RHINITIS, UNSPECIFIED TRIGGER: ICD-10-CM

## 2022-09-28 PROCEDURE — 99214 OFFICE O/P EST MOD 30 MIN: CPT | Performed by: FAMILY MEDICINE

## 2022-09-28 PROCEDURE — G8427 DOCREV CUR MEDS BY ELIG CLIN: HCPCS | Performed by: FAMILY MEDICINE

## 2022-09-28 PROCEDURE — 1036F TOBACCO NON-USER: CPT | Performed by: FAMILY MEDICINE

## 2022-09-28 PROCEDURE — G8417 CALC BMI ABV UP PARAM F/U: HCPCS | Performed by: FAMILY MEDICINE

## 2022-09-28 RX ORDER — LORATADINE 10 MG/1
10 TABLET ORAL DAILY
Qty: 30 TABLET | Refills: 5 | Status: SHIPPED | OUTPATIENT
Start: 2022-09-28

## 2022-09-28 RX ORDER — ETHINYL ESTRADIOL/DROSPIRENONE 0.02-3(28)
TABLET ORAL
COMMUNITY
Start: 2022-06-29

## 2022-09-28 RX ORDER — TIZANIDINE 4 MG/1
4 TABLET ORAL NIGHTLY PRN
Qty: 10 TABLET | Refills: 0 | Status: SHIPPED | OUTPATIENT
Start: 2022-09-28

## 2022-09-28 RX ORDER — ATOMOXETINE 10 MG/1
CAPSULE ORAL
COMMUNITY
Start: 2022-09-21

## 2022-09-28 SDOH — ECONOMIC STABILITY: FOOD INSECURITY: WITHIN THE PAST 12 MONTHS, YOU WORRIED THAT YOUR FOOD WOULD RUN OUT BEFORE YOU GOT MONEY TO BUY MORE.: NEVER TRUE

## 2022-09-28 SDOH — ECONOMIC STABILITY: FOOD INSECURITY: WITHIN THE PAST 12 MONTHS, THE FOOD YOU BOUGHT JUST DIDN'T LAST AND YOU DIDN'T HAVE MONEY TO GET MORE.: NEVER TRUE

## 2022-09-28 ASSESSMENT — SOCIAL DETERMINANTS OF HEALTH (SDOH): HOW HARD IS IT FOR YOU TO PAY FOR THE VERY BASICS LIKE FOOD, HOUSING, MEDICAL CARE, AND HEATING?: NOT HARD AT ALL

## 2022-09-28 NOTE — PROGRESS NOTES
CHRONIC CONDITION FOLLOW-UP     Assessment and Plan:      Diagnosis Orders   1. Moderate persistent asthma without complication  EMG      2. Bilateral carpal tunnel syndrome  Elastic Bandages & Supports (WRIST SPLINT/COCK-UP/LEFT M) MISC    Elastic Bandages & Supports (WRIST SPLINT/COCK-UP/RIGHT M) MISC      3. Acute right-sided thoracic back pain  tiZANidine (ZANAFLEX) 4 MG tablet      4. Facial pain, acute  tiZANidine (ZANAFLEX) 4 MG tablet      5. Seasonal allergic rhinitis, unspecified trigger  loratadine (CLARITIN) 10 MG tablet      Stable     Continue current Tx plan. Any changes marked below. INSTRUCTIONS  NEXT APPOINTMENT: Please schedule annual complete physical (30 minutes) in 6 months FASTING. Please get flu vaccine when available in fall. Can get either at this office or at stores such as [employer]. Wear splints at night and during day if needed. Get EMG. May need to see ortho. Take muscle relaxer at onset on the pain that spreads from R back to face. Subjective:      Chief Complaint   Patient presents with    Numbness     X1 year. Both hands. Has gotten worse. Pain I she uses hands too much. Other     Went er neck pain and numbness. Tingling in legs and feet. Skin Problem     Noticed red spot on her back about 6 months ago. 3 weeks ago it turned brown. Zechariah Rahman is an 27 y.o. female who presents for follow up    Complaints:   Asthma did well over summer  Infrequent albuterol use since cold weather. Cough x 3 d. Post nasal drip noted. No wheeze or shortness of breath. Hands  get numb R>L, pain with , dropping things, becoming constant. Handwriting getting poor. Went to ER with R thoracic pain that goes to R face. X 9 months. Happens about once a month and heat helps. Face gets numb and painful. May last a day. Seems to happen after activity that hurts her back. NSAID no help. CHART REVIEW   reports that she has never smoked.  She has never used smokeless tobacco.  Health Maintenance Due   Topic Date Due    COVID-19 Vaccine (1) Never done    Flu vaccine (1) 08/01/2022     Current Outpatient Medications   Medication Instructions    albuterol (PROVENTIL) 2.5 mg, Nebulization, EVERY 6 HOURS PRN    albuterol sulfate HFA (VENTOLIN HFA) 108 (90 Base) MCG/ACT inhaler 2 puffs, Inhalation, DAILY PRN    atomoxetine (STRATTERA) 10 MG capsule No dose, route, or frequency recorded. busPIRone (BUSPAR) 5 MG tablet No dose, route, or frequency recorded. dicyclomine (BENTYL) 20 MG tablet TAKE ONE TABLET BY MOUTH FOUR TIMES A DAY    Elastic Bandages & Supports (WRIST SPLINT/COCK-UP/LEFT M) MISC Wear nightly. Dx: carpal tunnel syndrome (G56.01)    Elastic Bandages & Supports (WRIST SPLINT/COCK-UP/RIGHT M) MISC Wear nightly. Dx: carpal tunnel syndrome (G56.01)    EPINEPHrine (EPIPEN 2-KATIANA) 0.3 mg, IntraMUSCular, ONCE, Use as directed for allergic reaction    fluticasone-salmeterol (ADVAIR) 100-50 MCG/DOSE diskus inhaler INHALE ONE PUFF BY MOUTH TWICE A DAY    gabapentin (NEURONTIN) 300 MG capsule TAKE ONE CAPSULE BY MOUTH ONCE NIGHTLY    hydrOXYzine (ATARAX) 25 MG tablet No dose, route, or frequency recorded. lamoTRIgine  MG TB24 TAKE ONE TABLET BY MOUTH ONCE NIGHTLY    levothyroxine (SYNTHROID) 25 MCG tablet TAKE ONE TABLET BY MOUTH DAILY    loratadine (CLARITIN) 10 mg, Oral, DAILY    lurasidone (LATUDA) 20 mg, Oral, DAILY    melatonin 3 mg, Oral, DAILY    NIKIA 3-0.02 MG per tablet No dose, route, or frequency recorded.     oxybutynin (DITROPAN-XL) 10 MG extended release tablet TAKE ONE TABLET BY MOUTH TWICE A DAY    pantoprazole (PROTONIX) 40 mg, Oral, 2 TIMES DAILY    tiZANidine (ZANAFLEX) 4 mg, Oral, NIGHTLY PRN     LAST LABS  Lab Results   Component Value Date    LDLCALC 113 (H) 05/26/2021    LDLDIRECT 109 (H) 10/01/2019     Lab Results   Component Value Date    HDL 39 (L) 05/26/2021     Lab Results   Component Value Date    TRIG 170 (H) 05/26/2021     Lab Results

## 2022-09-28 NOTE — PATIENT INSTRUCTIONS
INSTRUCTIONS  NEXT APPOINTMENT: Please schedule annual complete physical (30 minutes) in 6 months FASTING. Please get flu vaccine when available in fall. Can get either at this office or at stores such as [employer] [facility] and [employer]. Wear splints at night and during day if needed. Get EMG. May need to see ortho. Take muscle relaxer at onset on the pain that spreads from R back to face. Patient Education      CARPAL TUNNEL SYNDROME    Overview   What is carpal tunnel syndrome? Carpal tunnel syndrome is a painful disorder of the wrist and hand. The carpal tunnel is a narrow tunnel formed by the bones and other tissues of your wrist. This tunnel protects your median nerve. The median nerve helps you move your thumbs and the first 3 fingers on each hand. Carpal tunnel syndrome occurs when other tissues in the carpal tunnel (such as ligaments and tendons) get swollen or inflamed and press against the median nerve. That pressure can make part of your hand hurt or feel numb. Symptoms   What are the symptoms of carpal tunnel syndrome? The symptoms of carpal tunnel syndrome include the following:  Numbness or tingling in your hand and fingers, especially the thumb, index and middle fingers. Pain in your wrist, palm or forearm. More numbness or pain at night than during the day. The pain may be so bad it wakes you up. You may shake or rub your hand to get relief. Pain that increases when you use your hand or wrist more. Trouble gripping objects, such as a doorknob or the steering wheel of a car. Weakness in your thumb. How serious is carpal tunnel syndrome? Carpal tunnel syndrome usually isn't serious. With treatment, the pain will usually go away and you'll have no lasting damage to your hand or wrist.    Causes & Risk Factors   What causes carpal tunnel syndrome? Doing the same hand movements over and over can lead to carpal tunnel syndrome.  It's most common in people whose jobs require pinching or gripping with the wrist held bent. People at risk include people who use computers, , grocery checkers, assembly-line workers, meat packers, musicians and mechanics. Hobbies such as gardening, needlework, golfing and canoeing can sometimes bring on the symptoms. Women are more likely to develop carpal tunnel syndrome than men. It also tends to be hereditary (which means it runs in families). Carpal tunnel syndrome may also be caused by an injury to the wrist, such as a fracture. Or it may be caused by a disease such as diabetes, rheumatoid arthritis or thyroid disease. Carpal tunnel syndrome is also common during the last few months of pregnancy. Diagnosis & Tests   How is carpal tunnel syndrome diagnosed? Your doctor will probably ask you about your symptoms. He or she may examine you and ask you how you use your hands. Your doctor may also do these tests:  Your doctor may tap the inside of your wrist. You may feel pain or a sensation that feels like an electric shock. Your doctor may ask you to bend your wrist down for 1 minute to see if this causes symptoms. Your doctor may have you get a nerve conduction test or an electromyography (EMG) test to see whether the nerves and muscles in your arm and hand show the typical effects of carpal tunnel syndrome. Treatment   How is carpal tunnel syndrome treated? If carpal tunnel syndrome is caused by a medical problem (such as rheumatoid arthritis), your doctor will probably treat that problem first.  Your doctor may ask you to rest your wrist or change how you use your hand. He or she may also ask you to wear a splint on your wrist. The splint keeps your wrist from moving but lets your hand do most of what it normally does. A splint can help ease the pain of carpal tunnel syndrome, especially at night. Putting ice on your wrist to reduce swelling, massaging the area and doing stretching exercises may also help.  An over-the-counter nonsteroidal anti-inflammatory drugs (NSAIDs) can relieve swelling and pain. These medicines include aspirin, ibuprofen (brand names: Motrin, Advil) and naproxen (brand name: Aleve). In more severe cases, your doctor might inject your wrist with a corticosteroid, which reduces inflammation and pain. Tips on relieving carpal tunnel syndrome  Prop up your arm with pillows when you lie down. Avoid overusing the affected hand. Find a new way to use your hand by using a different tool. Try to use the unaffected hand more often. Avoid holding your wrists in a downward bent position for long periods of time. What if these treatments don't help? In some cases, surgery is needed to make the symptoms go away completely. The surgery involves cutting the ligament that may be pressing on your median nerve. Zuly Frazier usually get back the normal use of your wrist and hand within a few weeks to a few months after surgery. Doing the hand, wrist and finger exercises that your doctor tells you to do after surgery is very important. Without exercise, your wrist may get stiff and you may lose some use of your hand. Prevention   Can I prevent carpal tunnel syndrome? Yes. See the box below for some tips on preventing carpal tunnel syndrome. Many products you can buy -- such as wrist rests -- are supposed to ease symptoms of carpal tunnel syndrome. No one has proven that these products really prevent wrist problems. Some people may have less pain and numbness after using these products, but other people may have increased pain and numbness. Things that may help prevent carpal tunnel syndrome  Lose weight if you're overweight. Get treatment for any disease you have that may cause carpal tunnel syndrome. If you do the same tasks over and over with your hands, try not to bend, extend or twist your hands for long periods of time. Don't work with your arms too close or too far from your body.    Don't rest your wrists on hard

## 2022-10-04 ENCOUNTER — OFFICE VISIT (OUTPATIENT)
Dept: BEHAVIORAL/MENTAL HEALTH CLINIC | Age: 32
End: 2022-10-04
Payer: COMMERCIAL

## 2022-10-04 DIAGNOSIS — F43.10 PTSD (POST-TRAUMATIC STRESS DISORDER): Primary | ICD-10-CM

## 2022-10-04 PROCEDURE — 90837 PSYTX W PT 60 MINUTES: CPT

## 2022-10-04 PROCEDURE — 1036F TOBACCO NON-USER: CPT
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
## 2022-10-06 ENCOUNTER — PROCEDURE VISIT (OUTPATIENT)
Dept: NEUROLOGY | Age: 32
End: 2022-10-06
Payer: COMMERCIAL

## 2022-10-06 DIAGNOSIS — G56.21 ENTRAPMENT OF RIGHT ULNAR NERVE AT ELBOW: Primary | ICD-10-CM

## 2022-10-06 PROCEDURE — 95909 NRV CNDJ TST 5-6 STUDIES: CPT | Performed by: PSYCHIATRY & NEUROLOGY

## 2022-10-06 PROCEDURE — 95886 MUSC TEST DONE W/N TEST COMP: CPT | Performed by: PSYCHIATRY & NEUROLOGY

## 2022-10-06 NOTE — PROGRESS NOTES
Tamia Tirado M.D. [facility]) Physicians/[facility]  Board Certified in [address], [address], [address]    EMG / NERVE CONDUCTION STUDY      PATIENT:  Lenny Villegas       DATE OF EMG:  10/06/22     YOB: 1990       REASON FOR EMG:   Right arm numbness      REFERRING PHYSICIAN:  Celso Ward MD  [address],  [address] [place] [address]     SUMMARY:   The right median motor and sensory nerve studies were normal.  The right ulnar motor nerve study had a mild slowing of conduction velocity across the elbow. The right ulnar and radial sensory nerve studies were normal.  Needle EMG of several muscles in the right upper extremity was normal.      CLINICAL DIAGNOSIS:  Unspecified neuropathy        EMG RESULTS:     This patient has a mild right ulnar nerve lesion at the elbow.        ---------------------------------------------  Tamia Tirado M.D.   Electromyographer / Neurologist

## 2022-10-06 NOTE — PATIENT INSTRUCTIONS
Verbal consent was obtained from patient and/or patient's advocate for in office procedure with Dr. Aubrey Johnson (EMG or EEG).

## 2022-10-18 ENCOUNTER — OFFICE VISIT (OUTPATIENT)
Dept: BEHAVIORAL/MENTAL HEALTH CLINIC | Age: 32
End: 2022-10-18

## 2022-10-18 DIAGNOSIS — F43.10 PTSD (POST-TRAUMATIC STRESS DISORDER): Primary | ICD-10-CM

## 2022-10-18 PROCEDURE — 90837 PSYTX W PT 60 MINUTES: CPT

## 2022-10-18 PROCEDURE — 1036F TOBACCO NON-USER: CPT
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
## 2022-11-01 ENCOUNTER — OFFICE VISIT (OUTPATIENT)
Dept: BEHAVIORAL/MENTAL HEALTH CLINIC | Age: 32
End: 2022-11-01
Payer: COMMERCIAL

## 2022-11-01 DIAGNOSIS — F43.10 PTSD (POST-TRAUMATIC STRESS DISORDER): Primary | ICD-10-CM

## 2022-11-01 PROCEDURE — 90837 PSYTX W PT 60 MINUTES: CPT

## 2022-11-01 PROCEDURE — 1036F TOBACCO NON-USER: CPT
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
## 2022-11-15 ENCOUNTER — OFFICE VISIT (OUTPATIENT)
Dept: BEHAVIORAL/MENTAL HEALTH CLINIC | Age: 32
End: 2022-11-15

## 2022-11-15 DIAGNOSIS — F43.10 PTSD (POST-TRAUMATIC STRESS DISORDER): Primary | ICD-10-CM

## 2022-11-25 DIAGNOSIS — J30.2 SEASONAL ALLERGIC RHINITIS, UNSPECIFIED TRIGGER: ICD-10-CM

## 2022-11-25 DIAGNOSIS — G47.00 INSOMNIA, UNSPECIFIED TYPE: ICD-10-CM

## 2022-11-25 DIAGNOSIS — K21.9 GASTROESOPHAGEAL REFLUX DISEASE WITHOUT ESOPHAGITIS: ICD-10-CM

## 2022-11-25 DIAGNOSIS — G25.81 RESTLESS LEGS SYNDROME (RLS): ICD-10-CM

## 2022-11-28 RX ORDER — GABAPENTIN 300 MG/1
CAPSULE ORAL
Qty: 30 CAPSULE | Refills: 3 | Status: SHIPPED | OUTPATIENT
Start: 2022-11-28 | End: 2023-03-24

## 2022-11-28 RX ORDER — PANTOPRAZOLE SODIUM 40 MG/1
40 TABLET, DELAYED RELEASE ORAL 2 TIMES DAILY
Qty: 60 TABLET | Refills: 5 | Status: SHIPPED | OUTPATIENT
Start: 2022-11-28

## 2022-11-28 RX ORDER — LANOLIN ALCOHOL/MO/W.PET/CERES
3 CREAM (GRAM) TOPICAL DAILY
Qty: 90 TABLET | Refills: 1 | Status: SHIPPED | OUTPATIENT
Start: 2022-11-28

## 2022-11-28 RX ORDER — LORATADINE 10 MG/1
10 TABLET ORAL DAILY
Qty: 30 TABLET | Refills: 5 | Status: SHIPPED | OUTPATIENT
Start: 2022-11-28
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
## 2022-12-28 ENCOUNTER — OFFICE VISIT (OUTPATIENT)
Dept: FAMILY MEDICINE CLINIC | Age: 32
End: 2022-12-28
Payer: COMMERCIAL

## 2022-12-28 VITALS
RESPIRATION RATE: 16 BRPM | OXYGEN SATURATION: 97 % | HEART RATE: 113 BPM | SYSTOLIC BLOOD PRESSURE: 120 MMHG | TEMPERATURE: 98.8 F | DIASTOLIC BLOOD PRESSURE: 80 MMHG

## 2022-12-28 DIAGNOSIS — J06.9 VIRAL URI: Primary | ICD-10-CM

## 2022-12-28 PROCEDURE — 99213 OFFICE O/P EST LOW 20 MIN: CPT | Performed by: FAMILY MEDICINE

## 2022-12-28 PROCEDURE — G8484 FLU IMMUNIZE NO ADMIN: HCPCS | Performed by: FAMILY MEDICINE

## 2022-12-28 PROCEDURE — G8427 DOCREV CUR MEDS BY ELIG CLIN: HCPCS | Performed by: FAMILY MEDICINE

## 2022-12-28 PROCEDURE — G8417 CALC BMI ABV UP PARAM F/U: HCPCS | Performed by: FAMILY MEDICINE

## 2022-12-28 PROCEDURE — 1036F TOBACCO NON-USER: CPT | Performed by: FAMILY MEDICINE

## 2022-12-28 NOTE — PATIENT INSTRUCTIONS
INSTRUCTIONS  May take Mucinex (guaifenisen) and Robitussin DM (guafenisen, dextromethorphan) for cough and congestion. May also try Vicks Vaporub. AVOID decongestants like phenylephrine and pseudoephedrine. AVOID Afrin. .  May take ibuprofen (Motrin, Advil) 200 mg tabs up to 4 tabs every 8 hours. May also take acetaminophen (Tylenol) as instructed on packaging. Please call if cold symptoms getting worse after 7 days from onset, last longer than 10 days, having high fevers, having trouble breathing in chest or extremely ill.

## 2022-12-28 NOTE — PROGRESS NOTES
UPPER RESPIRATORY VISIT  Subjective:      Chief Complaint   Patient presents with    Sinus Problem     Sinus congestion, cough, sore throat for 1 week      Gokul Jacobson is a 27 y.o. female who presents for evaluation of symptoms of URI. Onset of symptoms was 5 days ago. Symptoms include nasal congestion and non productive cough and are gradually worsening since that time. Other symptoms: head pressure, R ear pain  Denies: low grade fever, shortness of breath, sneezing, and wheezing. Tried OTC: antihistamine-decongestant of choice, cough suppressant of choice with some relief of symptoms     CHART REVIEW  Health Maintenance   Topic Date Due    COVID-19 Vaccine (1) Never done    Flu vaccine (1) 08/01/2022    Depression Monitoring  03/01/2023    A1C test (Diabetic or Prediabetic)  04/19/2023    Cervical cancer screen  06/03/2025    DTaP/Tdap/Td vaccine (8 - Td or Tdap) 08/11/2028    Hib vaccine  Completed    Pneumococcal 0-64 years Vaccine  Completed    Hepatitis C screen  Completed    HIV screen  Completed    Hepatitis A vaccine  Aged Out    Meningococcal (ACWY) vaccine  Aged Out    Varicella vaccine  Discontinued     Prior to Visit Medications    Medication Sig Taking? Authorizing Provider   gabapentin (NEURONTIN) 300 MG capsule TAKE ONE CAPSULE BY MOUTH ONCE NIGHTLY Yes Joycelyn Bobby MD   pantoprazole (PROTONIX) 40 MG tablet Take 1 tablet by mouth 2 times daily Yes Joycelyn Bobby MD   melatonin 3 MG TABS tablet Take 1 tablet by mouth daily Yes Joycelyn Bobby MD   loratadine (CLARITIN) 10 MG tablet Take 1 tablet by mouth daily Yes Joycelyn Bobby MD   atomoxetine (STRATTERA) 10 MG capsule  Yes Historical Provider, MD Hayden Egan 3-0.02 MG per tablet  Yes Historical Provider, MD   Elastic Bandages & Supports (WRIST SPLINT/COCK-UP/LEFT M) MISC Wear nightly. Dx: carpal tunnel syndrome (G56.01) Yes Joycelyn Bobby MD   Elastic Bandages & Supports (WRIST SPLINT/COCK-UP/RIGHT M) MISC Wear nightly.   Dx: carpal tunnel syndrome (G56.01) Yes Madison Nina MD   tiZANidine (ZANAFLEX) 4 MG tablet Take 1 tablet by mouth nightly as needed (muscle pain) Yes Madison Nina MD   oxybutynin (DITROPAN-XL) 10 MG extended release tablet TAKE ONE TABLET BY MOUTH TWICE A DAY Yes YENNY Estrada CNP   fluticasone-salmeterol (ADVAIR) 100-50 MCG/DOSE diskus inhaler INHALE ONE PUFF BY MOUTH TWICE A DAY Yes Madison Nina MD   albuterol sulfate HFA (VENTOLIN HFA) 108 (90 Base) MCG/ACT inhaler Inhale 2 puffs into the lungs daily as needed for Wheezing or Shortness of Breath Yes Madison Nina MD   levothyroxine (SYNTHROID) 25 MCG tablet TAKE ONE TABLET BY MOUTH DAILY Yes Madison Nina MD   dicyclomine (BENTYL) 20 MG tablet TAKE ONE TABLET BY MOUTH FOUR TIMES A DAY Yes Madison Nina MD   lamoTRIgine  MG TB24 TAKE ONE TABLET BY MOUTH ONCE NIGHTLY Yes Herman Chaudhry MD   lurasidone (LATUDA) 20 MG TABS tablet Take 20 mg by mouth daily Yes Historical Provider, MD   busPIRone (BUSPAR) 5 MG tablet  Yes Historical Provider, MD   hydrOXYzine (ATARAX) 25 MG tablet  Yes Historical Provider, MD   EPINEPHrine (EPIPEN 2-KATIANA) 0.3 MG/0.3ML SOAJ injection Inject 0.3 mLs into the muscle once for 1 dose Use as directed for allergic reaction  YENNY Estrada CNP   albuterol (PROVENTIL) (2.5 MG/3ML) 0.083% nebulizer solution Take 3 mLs by nebulization every 6 hours as needed for Wheezing  Madison Nina MD      Social History     Tobacco Use    Smoking status: Never    Smokeless tobacco: Never    Tobacco comments:     congratulated on nonsmoking status.    Vaping Use    Vaping Use: Never used   Substance Use Topics    Alcohol use: No     Alcohol/week: 0.0 standard drinks    Drug use: No      LAST LABS  Cholesterol, Total   Date Value Ref Range Status   05/26/2021 186 0 - 199 mg/dL Final     LDL Calculated   Date Value Ref Range Status   05/26/2021 113 (H) <100 mg/dL Final     HDL   Date Value Ref Range Status   05/26/2021 39 (L) 40 - 60 mg/dL Final Triglycerides   Date Value Ref Range Status   05/26/2021 170 (H) 0 - 150 mg/dL Final     Lab Results   Component Value Date    GLUCOSE 119 (H) 07/02/2022     Lab Results   Component Value Date     07/02/2022    K 3.8 07/02/2022    CREATININE 0.7 07/02/2022     Lab Results   Component Value Date    WBC 9.1 07/02/2022    HGB 13.8 07/02/2022    HCT 40.6 07/02/2022    MCV 84.5 07/02/2022     07/02/2022     Lab Results   Component Value Date    ALT 11 07/02/2022    AST 13 (L) 07/02/2022    ALKPHOS 71 07/02/2022    BILITOT <0.2 07/02/2022     TSH (uIU/mL)   Date Value   04/19/2022 1.28     Lab Results   Component Value Date    LABA1C 5.5 04/19/2022     Objective:   PHYSICAL EXAM  /80 (Site: Right Upper Arm, Position: Sitting, Cuff Size: Medium Adult)   Pulse (!) 113   Temp 98.8 °F (37.1 °C) (Oral)   Resp 16   SpO2 97%   Wt Readings from Last 3 Encounters:   09/28/22 196 lb 3.2 oz (89 kg)   03/30/22 201 lb 9.6 oz (91.4 kg)   12/15/21 213 lb (96.6 kg)     BP Readings from Last 3 Encounters:   12/28/22 120/80   09/28/22 118/76   07/03/22 128/88     GENERAL: well-developed, well-nourished, alert, no distress  EYES: negative findings: lids and lashes normal and conjunctivae and sclerae normal  ENT: normal TM and external ear canal right ear and abnormal TM left ear - bulging and serous middle ear fluid  External nose and ears appear normal  Pharynx: red, cobblestoned. Exudates: None  Lips, mucosa, and tongue normal and teeth normal  Hearing grossly normal  NECK: Supple, symmetrical, trachea midline  Thyroid not enlarged, symmetric, no tenderness/mass/nodules  no cervical nodes, no supraclavicular nodes  LUNGS:  Breathing unlabored  clear to auscultation bilaterally,  good air movement  CARDIOVASC: regular rate and rhythm    Assessment/Plan:      Diagnosis Orders   1. Viral URI        Explained lack of efficacy of antibiotics in viral disease.   INSTRUCTIONS  May take Mucinex (guaifenisen) and Robitussin DM (guafenisen, dextromethorphan) for cough and congestion. May also try Vicks Vaporub. AVOID decongestants like phenylephrine and pseudoephedrine. AVOID Afrin. .  May take ibuprofen (Motrin, Advil) 200 mg tabs up to 4 tabs every 8 hours. May also take acetaminophen (Tylenol) as instructed on packaging. Please call if cold symptoms getting worse after 7 days from onset, last longer than 10 days, having high fevers, having trouble breathing in chest or extremely ill.

## 2023-01-03 ENCOUNTER — TELEMEDICINE (OUTPATIENT)
Dept: BEHAVIORAL/MENTAL HEALTH CLINIC | Age: 33
End: 2023-01-03
Payer: COMMERCIAL

## 2023-01-03 DIAGNOSIS — F43.10 PTSD (POST-TRAUMATIC STRESS DISORDER): Primary | ICD-10-CM

## 2023-01-03 PROCEDURE — 1036F TOBACCO NON-USER: CPT

## 2023-01-03 PROCEDURE — 90837 PSYTX W PT 60 MINUTES: CPT
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
## 2023-01-17 ENCOUNTER — OFFICE VISIT (OUTPATIENT)
Dept: BEHAVIORAL/MENTAL HEALTH CLINIC | Age: 33
End: 2023-01-17

## 2023-01-17 DIAGNOSIS — F43.10 PTSD (POST-TRAUMATIC STRESS DISORDER): Primary | ICD-10-CM
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
## 2023-01-30 ENCOUNTER — OFFICE VISIT (OUTPATIENT)
Dept: FAMILY MEDICINE CLINIC | Age: 33
End: 2023-01-30
Payer: COMMERCIAL

## 2023-01-30 VITALS
TEMPERATURE: 98.4 F | BODY MASS INDEX: 31.5 KG/M2 | DIASTOLIC BLOOD PRESSURE: 82 MMHG | SYSTOLIC BLOOD PRESSURE: 124 MMHG | WEIGHT: 196 LBS | HEART RATE: 88 BPM | OXYGEN SATURATION: 98 % | RESPIRATION RATE: 16 BRPM | HEIGHT: 66 IN

## 2023-01-30 DIAGNOSIS — H66.001 NON-RECURRENT ACUTE SUPPURATIVE OTITIS MEDIA OF RIGHT EAR WITHOUT SPONTANEOUS RUPTURE OF TYMPANIC MEMBRANE: Primary | ICD-10-CM

## 2023-01-30 PROCEDURE — 1036F TOBACCO NON-USER: CPT | Performed by: NURSE PRACTITIONER

## 2023-01-30 PROCEDURE — G8427 DOCREV CUR MEDS BY ELIG CLIN: HCPCS | Performed by: NURSE PRACTITIONER

## 2023-01-30 PROCEDURE — G8484 FLU IMMUNIZE NO ADMIN: HCPCS | Performed by: NURSE PRACTITIONER

## 2023-01-30 PROCEDURE — 99213 OFFICE O/P EST LOW 20 MIN: CPT | Performed by: NURSE PRACTITIONER

## 2023-01-30 PROCEDURE — G8417 CALC BMI ABV UP PARAM F/U: HCPCS | Performed by: NURSE PRACTITIONER

## 2023-01-30 RX ORDER — FLUTICASONE PROPIONATE 50 MCG
2 SPRAY, SUSPENSION (ML) NASAL DAILY
Qty: 16 G | Refills: 0 | Status: SHIPPED | OUTPATIENT
Start: 2023-01-30

## 2023-01-30 RX ORDER — FLUCONAZOLE 150 MG/1
150 TABLET ORAL ONCE
Qty: 2 TABLET | Refills: 0 | Status: SHIPPED | OUTPATIENT
Start: 2023-01-30 | End: 2023-01-30

## 2023-01-30 RX ORDER — AMOXICILLIN AND CLAVULANATE POTASSIUM 875; 125 MG/1; MG/1
TABLET, FILM COATED ORAL
COMMUNITY
Start: 2023-01-28

## 2023-01-30 ASSESSMENT — ENCOUNTER SYMPTOMS
SHORTNESS OF BREATH: 0
SINUS PAIN: 0
ABDOMINAL PAIN: 0
RHINORRHEA: 0
SINUS PRESSURE: 0
COUGH: 0
SORE THROAT: 0

## 2023-01-30 NOTE — PROGRESS NOTES
1/30/2023    This is a 27 y.o. female   Chief Complaint   Patient presents with    Otalgia     Rt ear. Started Friday night. Went to urgent care Saturday morning and given Augmentin. Has gotten worse. Had numbness on rt side of face this morning. Loss of hearing. Fever. Danae Balling HPI  Patient reports that last week started with sinus congestion. Friday 1/27 started with right ear pain and low grade fever. Went to urgent care on Sat and given augmentin for an ear infection. Feels that her hearing is decreased. Reports that sinus congestion, rhinorrhea, PND has resolved. Reports that right ear pain is stabbing and taking ibuprofen with improvement in pain. Temp at night is 99. Reports that she typically gets a yeast infection with antibiotic use and would like to have diflucan to take if needed.       Patient Active Problem List   Diagnosis    GERD (gastroesophageal reflux disease)    Irritable bowel    Insomnia    ADHD (attention deficit hyperactivity disorder)    Allergic rhinitis    Needs flu shot    Dysmenorrhea    Menorrhagia    PMS (premenstrual syndrome)    Anxiety    Tinea versicolor    Atypical depression    Herpes stomatitis    Hypothyroidism    Moderate persistent asthma without complication    Prediabetes    Moderate episode of recurrent major depressive disorder (HCC)    MARV (obstructive sleep apnea)    PLMD (periodic limb movement disorder)    Vitamin D deficiency    Obstructive sleep apnea, adult    Chronic GERD    Bipolar I disorder, most recent episode (or current) depressed (HCC)    Urge incontinence    Urinary retention    Simple chronic bronchitis (HCC)          Current Outpatient Medications   Medication Sig Dispense Refill    amoxicillin-clavulanate (AUGMENTIN) 875-125 MG per tablet       gabapentin (NEURONTIN) 300 MG capsule TAKE ONE CAPSULE BY MOUTH ONCE NIGHTLY 30 capsule 3    pantoprazole (PROTONIX) 40 MG tablet Take 1 tablet by mouth 2 times daily 60 tablet 5    melatonin 3 MG TABS tablet Take 1 tablet by mouth daily 90 tablet 1    loratadine (CLARITIN) 10 MG tablet Take 1 tablet by mouth daily 30 tablet 5    atomoxetine (STRATTERA) 10 MG capsule       NIKIA 3-0.02 MG per tablet       Elastic Bandages & Supports (WRIST SPLINT/COCK-UP/LEFT M) MISC Wear nightly. Dx: carpal tunnel syndrome (G56.01) 1 each 0    Elastic Bandages & Supports (WRIST SPLINT/COCK-UP/RIGHT M) MISC Wear nightly. Dx: carpal tunnel syndrome (G56.01) 1 each 0    tiZANidine (ZANAFLEX) 4 MG tablet Take 1 tablet by mouth nightly as needed (muscle pain) 10 tablet 0    oxybutynin (DITROPAN-XL) 10 MG extended release tablet TAKE ONE TABLET BY MOUTH TWICE A  tablet 1    fluticasone-salmeterol (ADVAIR) 100-50 MCG/DOSE diskus inhaler INHALE ONE PUFF BY MOUTH TWICE A DAY 1 each 1    albuterol sulfate HFA (VENTOLIN HFA) 108 (90 Base) MCG/ACT inhaler Inhale 2 puffs into the lungs daily as needed for Wheezing or Shortness of Breath 1 each 2    levothyroxine (SYNTHROID) 25 MCG tablet TAKE ONE TABLET BY MOUTH DAILY 30 tablet 5    dicyclomine (BENTYL) 20 MG tablet TAKE ONE TABLET BY MOUTH FOUR TIMES A  tablet 2    lamoTRIgine  MG TB24 TAKE ONE TABLET BY MOUTH ONCE NIGHTLY 30 tablet 1    hydrOXYzine (ATARAX) 25 MG tablet       lurasidone (LATUDA) 20 MG TABS tablet Take 20 mg by mouth daily (Patient not taking: Reported on 1/30/2023)      EPINEPHrine (EPIPEN 2-KATIANA) 0.3 MG/0.3ML SOAJ injection Inject 0.3 mLs into the muscle once for 1 dose Use as directed for allergic reaction 0.3 mL 0    busPIRone (BUSPAR) 5 MG tablet  (Patient not taking: Reported on 1/30/2023)      albuterol (PROVENTIL) (2.5 MG/3ML) 0.083% nebulizer solution Take 3 mLs by nebulization every 6 hours as needed for Wheezing 50 each 1     No current facility-administered medications for this visit.        Allergies   Allergen Reactions    Latex Swelling and Rash    Kiwi Extract Shortness Of Breath    Shellfish-Derived Products Swelling, Rash and Other (See Comments)     Tongue swelled up. Tramadol Other (See Comments)     Hallucinations. Zoloft [Sertraline Hcl]      overstim    Fluoxetine Nausea Only    Wellbutrin [Bupropion Hcl] Nausea Only       Review of Systems   Constitutional:  Positive for activity change, fatigue and fever. HENT:  Positive for ear pain. Negative for congestion, ear discharge, postnasal drip, rhinorrhea, sinus pressure, sinus pain and sore throat. Respiratory:  Negative for cough and shortness of breath. Cardiovascular:  Negative for chest pain. Gastrointestinal:  Negative for abdominal pain. Neurological:  Negative for dizziness and headaches. Vitals:    01/30/23 1406 01/30/23 1420   BP: 124/82    Site: Right Upper Arm    Position: Sitting    Cuff Size: Medium Adult    Pulse: (!) 116 88   Resp: 16    Temp: 98.4 °F (36.9 °C)    TempSrc: Oral    SpO2: 98%    Weight: 196 lb (88.9 kg)    Height: 5' 6\" (1.676 m)        Body mass index is 31.64 kg/m². Wt Readings from Last 3 Encounters:   01/30/23 196 lb (88.9 kg)   09/28/22 196 lb 3.2 oz (89 kg)   03/30/22 201 lb 9.6 oz (91.4 kg)       BP Readings from Last 3 Encounters:   01/30/23 124/82   12/28/22 120/80   09/28/22 118/76       Physical Exam  Vitals and nursing note reviewed. Constitutional:       General: She is not in acute distress. Appearance: She is well-developed. HENT:      Head: Normocephalic and atraumatic. Right Ear: Ear canal and external ear normal. Tympanic membrane is erythematous and bulging. Left Ear: Tympanic membrane, ear canal and external ear normal. Tympanic membrane is not erythematous or bulging. Cardiovascular:      Rate and Rhythm: Normal rate and regular rhythm. Heart sounds: Normal heart sounds. No murmur heard. No friction rub. No gallop. Pulmonary:      Effort: Pulmonary effort is normal. No respiratory distress. Breath sounds: Normal breath sounds. Musculoskeletal:      Cervical back: Neck supple. Lymphadenopathy:      Cervical: Cervical adenopathy present. Right cervical: Superficial cervical adenopathy present. Left cervical: Superficial cervical adenopathy present. Skin:     General: Skin is warm and dry. Neurological:      Mental Status: She is alert and oriented to person, place, and time. Psychiatric:         Behavior: Behavior normal.         Thought Content: Thought content normal.         Judgment: Judgment normal.        Rayshawn Collazo was seen today for otalgia. Diagnoses and all orders for this visit:    Non-recurrent acute suppurative otitis media of right ear without spontaneous rupture of tympanic membrane  Should complete the augmentin 875 mg BID for 10 days. Add flonase   -     fluticasone (FLONASE) 50 MCG/ACT nasal spray; 2 sprays by Each Nostril route daily  Patient is to call if symptoms worsen or fail to improve within a couple of days     Sent in diflucan in case she would develop a yeast infection from antibiotic use. -     fluconazole (DIFLUCAN) 150 MG tablet; Take 1 tablet by mouth once for 1 dose May repeat in 5 days, if symptoms persist or recur. For treatment of vaginal yeast infection. Return in about 1 week (around 2/6/2023), or if symptoms worsen or fail to improve, for ear check .

## 2023-01-31 ENCOUNTER — TELEPHONE (OUTPATIENT)
Dept: BEHAVIORAL/MENTAL HEALTH CLINIC | Age: 33
End: 2023-01-31

## 2023-01-31 ENCOUNTER — TELEMEDICINE (OUTPATIENT)
Dept: BEHAVIORAL/MENTAL HEALTH CLINIC | Age: 33
End: 2023-01-31

## 2023-01-31 DIAGNOSIS — F43.10 PTSD (POST-TRAUMATIC STRESS DISORDER): Primary | ICD-10-CM
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
## 2023-02-06 ENCOUNTER — OFFICE VISIT (OUTPATIENT)
Dept: ORTHOPEDIC SURGERY | Age: 33
End: 2023-02-06
Payer: COMMERCIAL

## 2023-02-06 VITALS — WEIGHT: 196 LBS | RESPIRATION RATE: 16 BRPM | HEIGHT: 66 IN | BODY MASS INDEX: 31.5 KG/M2

## 2023-02-06 DIAGNOSIS — M65.4 DE QUERVAIN'S TENOSYNOVITIS: Primary | ICD-10-CM

## 2023-02-06 DIAGNOSIS — G56.21 ENTRAPMENT OF RIGHT ULNAR NERVE: ICD-10-CM

## 2023-02-06 PROCEDURE — 1036F TOBACCO NON-USER: CPT | Performed by: PHYSICIAN ASSISTANT

## 2023-02-06 PROCEDURE — L3908 WHO COCK-UP NONMOLDE PRE OTS: HCPCS | Performed by: PHYSICIAN ASSISTANT

## 2023-02-06 PROCEDURE — G8417 CALC BMI ABV UP PARAM F/U: HCPCS | Performed by: PHYSICIAN ASSISTANT

## 2023-02-06 PROCEDURE — G8427 DOCREV CUR MEDS BY ELIG CLIN: HCPCS | Performed by: PHYSICIAN ASSISTANT

## 2023-02-06 PROCEDURE — 99203 OFFICE O/P NEW LOW 30 MIN: CPT | Performed by: PHYSICIAN ASSISTANT

## 2023-02-06 PROCEDURE — G8484 FLU IMMUNIZE NO ADMIN: HCPCS | Performed by: PHYSICIAN ASSISTANT

## 2023-02-06 NOTE — PROGRESS NOTES
Ms. Emelyn Flores is a 27 y.o. right handed woman  who is seen today in Hand Surgical Consultation at the request of Debra Jurado MD.    She presents today regarding Right symptoms which have been present for approximately 6 months. A history of antecedent trauma or injury is Absent. She reports symptoms to include moderate numbness & tingling in the Whole Hand. Neurologic symptoms do Frequently awaken her from sleep. She reports no pain located in the Medial right elbow, with retrograde and antegrade radiation . Symptoms are worsening over time. She also today reports right sided dorso-radial wrist symptoms which have been present for approximately 6 months. A history of antecedent trauma or injury is Absent. She reports symptoms to include moderate pain along the  right first Extensor Compartment Sheath. Wrist symptoms are worse with certain twisting or gripping activities. Symptoms are Daily worse with use. She reports moderate pain with thumb extension or pinch. Symptoms are worsening over time. Previous treatment has included conservative measures, activity modification, avoidance of bothersome tasks, and OTC medication. She does claim relation of her symptoms to her required work activities. She has undergone electrodiagnostic testing. I have today reviewed with Emelyn Flores the clinically relevant, past medical history, medications, allergies,  family history, social history, and Review Of Systems & I have documented any details relevant to today's presenting complaints in my history above. Ms. Josefina Irvin's self-reported past medical history, medications, allergies,  family history, social history, and Review Of Systems have been scanned into the chart under the \"Media\" tab. Physical Exam:  Ms. Josefina Irvin's most recent vitals:  Vitals  Resp: 16  Height: 5' 6\" (167.6 cm)  Weight: 196 lb (88.9 kg)    She is well nourished, oriented to person, place & time.   She demonstrates appropriate mood and affect as well as normal gait and station. Skin: Normal in appearance, Normal Color, and Free of Lesions Bilaterally   Digital range of motion is equal bilaterally   Wrist range of motion is equal bilaterally   Elbow range of motion is equal bilaterally   Sensation is subjectively tingling in the Whole Hand and objectively present in the same distribution bilaterally. All other digits show normal sensation  Vascular examination reveals good capillary refill and good color bilaterally  Swelling is minimal about the elbow on the Right, normal on the Left  There is no evidence of gross joint instability bilaterally. Muscular strength is clinically appropriate bilaterally. Examination for Cubital Tunnel Syndrome on the right shows no tenderness to palpation at the Medial epicondyle. The Ulnar Nerve rests behind the medial epicondyle without subluxation upon elbow flexion. Elbow flexion-compression test is Negative, and there is an active Tinnel's Sign over the Cubital Tunnel . The Ulnar Nerve innervated intrinsic musculature is not atrophied & weakened. Examination of the right first dorsal extensor compartment of the wrist demonstrates little thickening and fullness. There is moderate tenderness to palpation over the extensor tendons. There is moderate pain with resisted thumb extension, and Finklestein's maneuver is positive right side. Wrist range of motion is accompanied by moderate pain in dorsiflexion greater than palmar flexion. Cervical Spine: Active Range of Motion  is Decreased with pain at extremes. Lateral bending does reproduce symptoms in the symptomatic extremity, Maximal rotation does reproduce symptoms in the symptomatic extremity. Review of Electrodiagnostic Testing:  Electrodiagnostic Studies performed by another Physician outside of my practice were Personally Reviewed & Interpreted by myself today.     Test performed on: 10/6/22    NERVE CONDUCTION STUDY:  RIGHT   Median Nerve: Sensory Latency: 3.18  Motor Latency: 2.71  Ulnar Nerve:  Conduction Velocity:  46.1    EMG:  RIGHT  Normal    My Interpretation: This study is consistent with: No RIGHT Median Nerve Entrapment at the Carpal Tunnel and Mild RIGHT Ulnar Nerve Entrapment at the Cubital Tunnel    Impression:  Ms. Lashell Ventura has developed Ulnar Nerve entrapment at the Cubital Tunnel and DeQuervain Tenosynovitis, which is currently exacerbated, and presents requesting further treatment. Plan:  I have had a thorough discussion with Ms. Lashell Ventura regarding the treatment options available for her initially presenting right  DeQuervain's Tenosynovitis, which is causing her significant symptoms and difficulty. I have outlined for Ms. Lashell Ventura the risk, benefits and consequences of the various treatment modalities, including a reasonable expectation for the long term success of each. We have discussed the likelihood that further, more aggressive treatment may be required for her current presenting condition. Based upon our current discussion and a reasonable understating of the options available to her, Ms. Lashell Ventura has selected to proceed with a conservative plan of treatment consisting of: the use of protective splints, activity modification, and the judicious use of over-the-counter anti-inflammatory medications if allowed by her primary care physician. An appropriately sized Removable forearm based Thumb-Spica orthosis was provided. Instructions were given regarding splint use and wear as well as suggestions for use of the other modalities were discussed. I have clearly explained to her that the above outlined treatment plan should not be expected to 'cure' her  DeQuervain's Tenosynovitis, but we are rather treating the symptoms with which she presents.   She has understood that in order to achieve more durable relief of her symptoms and to prevent future worsening or further damage, that definitive surgical treatment would be required. Ms. Raissa Rosado  voiced an appropriate understanding of our discussion, the options available to her, and of the expectations of her selected  treatment. I have had a thorough discussion with Ms. Raissa Rosado regarding the treatment options available for her initially presenting right  cubital tunnel syndrome, which is causing her significant symptoms and difficulty. I have outlined for Ms. Raissa Rosado the risk, benefits and consequences of the various treatment modalities, including a reasonable expectation for the long term success of each. We have discussed the likelihood that further, more aggressive treatment may be required for her current presenting condition. Based upon our current discussion and a reasonable understating of the options available to her, Ms. Raissa Rosado has selected to proceed with a conservative plan of treatment consisting of: attention to elbow positioning and pressure,  activity modification, and the judicious use of over-the-counter anti-inflammatory medications if allowed by her primary care physician. Instructions were given regarding the use of other modalities as appropriate. I have clearly explained to her that the above outlined treatment plan should not be expected to 'cure' her  cubital tunnel syndrome, but we are rather treating the symptoms with which she presents. She has understood that in order to achieve more durable relief of her symptoms and to prevent future worsening or further damage, that definitive surgical treatment would be required. Ms. Raissa Rosado  voiced an appropriate understanding of our discussion, the options available to her, and of the expectations of her selected  treatment. Procedures    Margi Parry Titan Wrist and Thumb Brace     Patient was prescribed a Margi Parry Titan Wrist and Thumb Brace.   The right wrist and thumb will require stabilization / immobilization from this semi-rigid / rigid orthosis to improve their function. The orthosis will assist in protecting the affected area, provide functional support and facilitate healing. The patient was educated and fit by a healthcare professional with expert knowledge and specialization in brace application while under the direct supervision of the treating physician. Verbal and written instructions for the use of and application of this item were provided. They were instructed to contact the office immediately should the brace result in increased pain, decreased sensation, increased swelling or worsening of the condition. As I do not find an etiology for her symptoms in my evaluation of her hand & wrist, I will refer Ms. Blanco Olivier for evaluation of her cervical spine to see if there is an ongoing problem at that level which may explain her presenting complaints. I have also discussed with Ms. Blanco Olivier  the other treatment options available to her  for this condition. We have today selected to proceed with conservative management. She and I have agreed that if our current course of conservative treatment does not prove to be effective over the short term future, that she will schedule a follow-up appointment to discuss and select an alternate course of therapy including possibly injection or surgical treatment. Ms. Blanco Olivier has been given a full verbal list of instructions and precautions related to her present condition. I have asked her to followup with me in the office at the prescribed time. She is also specifically requested to call or return to the office sooner if her symptoms change or worsen prior to the next scheduled appointment.

## 2023-02-13 ENCOUNTER — TELEMEDICINE (OUTPATIENT)
Dept: BEHAVIORAL/MENTAL HEALTH CLINIC | Age: 33
End: 2023-02-13
Payer: COMMERCIAL

## 2023-02-13 DIAGNOSIS — F43.10 PTSD (POST-TRAUMATIC STRESS DISORDER): Primary | ICD-10-CM

## 2023-02-13 PROCEDURE — 90832 PSYTX W PT 30 MINUTES: CPT
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
## 2023-02-28 ENCOUNTER — OFFICE VISIT (OUTPATIENT)
Dept: BEHAVIORAL/MENTAL HEALTH CLINIC | Age: 33
End: 2023-02-28

## 2023-02-28 DIAGNOSIS — F43.10 PTSD (POST-TRAUMATIC STRESS DISORDER): Primary | ICD-10-CM
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
## 2023-03-14 ENCOUNTER — OFFICE VISIT (OUTPATIENT)
Dept: BEHAVIORAL/MENTAL HEALTH CLINIC | Age: 33
End: 2023-03-14

## 2023-03-14 DIAGNOSIS — F43.10 PTSD (POST-TRAUMATIC STRESS DISORDER): Primary | ICD-10-CM
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
## 2023-03-30 ENCOUNTER — OFFICE VISIT (OUTPATIENT)
Dept: FAMILY MEDICINE CLINIC | Age: 33
End: 2023-03-30
Payer: COMMERCIAL

## 2023-03-30 VITALS
BODY MASS INDEX: 31.34 KG/M2 | OXYGEN SATURATION: 97 % | HEIGHT: 66 IN | RESPIRATION RATE: 16 BRPM | WEIGHT: 195 LBS | HEART RATE: 120 BPM | SYSTOLIC BLOOD PRESSURE: 122 MMHG | DIASTOLIC BLOOD PRESSURE: 84 MMHG

## 2023-03-30 DIAGNOSIS — E03.9 ACQUIRED HYPOTHYROIDISM: ICD-10-CM

## 2023-03-30 DIAGNOSIS — F31.30 BIPOLAR DISORDER, CURRENT EPISODE DEPRESSED, MILD OR MODERATE SEVERITY, UNSPECIFIED (HCC): ICD-10-CM

## 2023-03-30 DIAGNOSIS — E55.9 VITAMIN D DEFICIENCY: ICD-10-CM

## 2023-03-30 DIAGNOSIS — G25.81 RESTLESS LEGS SYNDROME (RLS): ICD-10-CM

## 2023-03-30 DIAGNOSIS — F43.10 PTSD (POST-TRAUMATIC STRESS DISORDER): ICD-10-CM

## 2023-03-30 DIAGNOSIS — D49.2 VASCULAR NEOPLASM OF SKIN: ICD-10-CM

## 2023-03-30 DIAGNOSIS — Z00.00 ENCOUNTER FOR WELL ADULT EXAM WITHOUT ABNORMAL FINDINGS: Primary | ICD-10-CM

## 2023-03-30 DIAGNOSIS — J41.0 SIMPLE CHRONIC BRONCHITIS (HCC): ICD-10-CM

## 2023-03-30 DIAGNOSIS — R73.03 PREDIABETES: ICD-10-CM

## 2023-03-30 DIAGNOSIS — B36.0 TINEA VERSICOLOR: ICD-10-CM

## 2023-03-30 DIAGNOSIS — K21.9 CHRONIC GERD: ICD-10-CM

## 2023-03-30 PROBLEM — F33.1 MODERATE EPISODE OF RECURRENT MAJOR DEPRESSIVE DISORDER (HCC): Status: RESOLVED | Noted: 2018-10-10 | Resolved: 2023-03-30

## 2023-03-30 PROCEDURE — G8484 FLU IMMUNIZE NO ADMIN: HCPCS | Performed by: FAMILY MEDICINE

## 2023-03-30 PROCEDURE — 99395 PREV VISIT EST AGE 18-39: CPT | Performed by: FAMILY MEDICINE

## 2023-03-30 RX ORDER — KETOCONAZOLE 20 MG/ML
SHAMPOO TOPICAL
Qty: 2 EACH | Refills: 3 | Status: SHIPPED | OUTPATIENT
Start: 2023-03-30 | End: 2024-03-28

## 2023-03-30 RX ORDER — GABAPENTIN 400 MG/1
CAPSULE ORAL
Qty: 30 CAPSULE | Refills: 3 | Status: SHIPPED | OUTPATIENT
Start: 2023-03-30 | End: 2023-07-30

## 2023-03-30 RX ORDER — GABAPENTIN 300 MG/1
CAPSULE ORAL
Qty: 30 CAPSULE | Refills: 3 | Status: SHIPPED | OUTPATIENT
Start: 2023-03-30 | End: 2023-03-30 | Stop reason: SDUPTHER

## 2023-03-30 SDOH — ECONOMIC STABILITY: FOOD INSECURITY: WITHIN THE PAST 12 MONTHS, YOU WORRIED THAT YOUR FOOD WOULD RUN OUT BEFORE YOU GOT MONEY TO BUY MORE.: NEVER TRUE

## 2023-03-30 SDOH — ECONOMIC STABILITY: HOUSING INSECURITY
IN THE LAST 12 MONTHS, WAS THERE A TIME WHEN YOU DID NOT HAVE A STEADY PLACE TO SLEEP OR SLEPT IN A SHELTER (INCLUDING NOW)?: NO

## 2023-03-30 SDOH — ECONOMIC STABILITY: INCOME INSECURITY: HOW HARD IS IT FOR YOU TO PAY FOR THE VERY BASICS LIKE FOOD, HOUSING, MEDICAL CARE, AND HEATING?: NOT HARD AT ALL

## 2023-03-30 SDOH — ECONOMIC STABILITY: FOOD INSECURITY: WITHIN THE PAST 12 MONTHS, THE FOOD YOU BOUGHT JUST DIDN'T LAST AND YOU DIDN'T HAVE MONEY TO GET MORE.: NEVER TRUE

## 2023-03-30 ASSESSMENT — PATIENT HEALTH QUESTIONNAIRE - PHQ9
5. POOR APPETITE OR OVEREATING: 0
8. MOVING OR SPEAKING SO SLOWLY THAT OTHER PEOPLE COULD HAVE NOTICED. OR THE OPPOSITE, BEING SO FIGETY OR RESTLESS THAT YOU HAVE BEEN MOVING AROUND A LOT MORE THAN USUAL: 0
4. FEELING TIRED OR HAVING LITTLE ENERGY: 0
10. IF YOU CHECKED OFF ANY PROBLEMS, HOW DIFFICULT HAVE THESE PROBLEMS MADE IT FOR YOU TO DO YOUR WORK, TAKE CARE OF THINGS AT HOME, OR GET ALONG WITH OTHER PEOPLE: 0
6. FEELING BAD ABOUT YOURSELF - OR THAT YOU ARE A FAILURE OR HAVE LET YOURSELF OR YOUR FAMILY DOWN: 0
SUM OF ALL RESPONSES TO PHQ QUESTIONS 1-9: 0
7. TROUBLE CONCENTRATING ON THINGS, SUCH AS READING THE NEWSPAPER OR WATCHING TELEVISION: 0
SUM OF ALL RESPONSES TO PHQ QUESTIONS 1-9: 0
1. LITTLE INTEREST OR PLEASURE IN DOING THINGS: 0
3. TROUBLE FALLING OR STAYING ASLEEP: 0
SUM OF ALL RESPONSES TO PHQ QUESTIONS 1-9: 0
9. THOUGHTS THAT YOU WOULD BE BETTER OFF DEAD, OR OF HURTING YOURSELF: 0
SUM OF ALL RESPONSES TO PHQ9 QUESTIONS 1 & 2: 0
2. FEELING DOWN, DEPRESSED OR HOPELESS: 0
SUM OF ALL RESPONSES TO PHQ QUESTIONS 1-9: 0

## 2023-03-30 NOTE — PROGRESS NOTES
Alert and oriented  Normal reasoning, insight good  Facial expressions full, mood appropriate  No memory disturbance noted  MUSCULOSKEL:    Gait normal, assistive device: none  No significant finger or nail findings  Spine symmetric, no deformities, no kyphosis      Assessment and Plan:      Diagnosis Orders   1. Encounter for well adult exam without abnormal findings        2. Acquired hypothyroidism  TSH      3. Bipolar disorder, current episode depressed, mild or moderate severity, unspecified ([facility]        4. Simple chronic bronchitis ([facility]        5. Prediabetes  Comprehensive Metabolic Panel    Lipid Panel      6. Vitamin D deficiency  Vitamin D 25 Hydroxy      7. PTSD (post-traumatic stress disorder)        8. Chronic GERD        9. Tinea versicolor  ketoconazole (NIZORAL) 2 % shampoo      10. Vascular neoplasm of skin  Amb External Referral To Dermatology      11. Restless legs syndrome (RLS)  gabapentin (NEURONTIN) 400 MG capsule    DISCONTINUED: gabapentin (NEURONTIN) 300 MG capsule      Stable. Plan as above and below. INSTRUCTIONS  NEXT APPOINTMENT: Please schedule check-up in 6 months with Dr. Efrain Johnson or her NP, Vanessa Brizuela. PLEASE GET FASTING BLOODWORK DRAWN SOON. Lab is on first floor in [address]. Hours Monday to Friday 6:30 AM to 4 PM.    Please get flu vaccine when available in fall. Can get either at this office or at stores such as [employer] and [employer]. See dermatology. Increase gabapentin to 400 mg for legs. Can increase to 600 mg in a month if needed.

## 2023-03-30 NOTE — PATIENT INSTRUCTIONS
help by participating in exercise and having meals with you, even if they may be eating something different. Find what works best for you. If you do not have time or do not like to cook, a program that offers meal replacement bars or shakes may be better for you. Or if you like to prepare meals, finding a plan that includes daily menus and recipes may be best.  Ask your doctor about other health professionals who can help you achieve your weight loss goals. A dietitian can help you make healthy changes in your diet. An exercise specialist or  can help you develop a safe and effective exercise program.  A counselor or psychiatrist can help you cope with issues such as depression, anxiety, or family problems that can make it hard to focus on weight loss. Consider joining a support group for people who are trying to lose weight. Your doctor can suggest groups in your area. Where can you learn more? Go to http://www.woods.com/ and enter U357 to learn more about \"Starting a Weight Loss Plan: Care Instructions. \"  Current as of: May 9, 2022               Content Version: 13.6  © 9888-6389 Healthwise, Incorporated. Care instructions adapted under license by [facility] ([facility]). If you have questions about a medical condition or this instruction, always ask your healthcare professional. [address] any warranty or liability for your use of this information.

## 2023-04-03 ENCOUNTER — OFFICE VISIT (OUTPATIENT)
Dept: ORTHOPEDIC SURGERY | Age: 33
End: 2023-04-03

## 2023-04-03 VITALS — WEIGHT: 195 LBS | BODY MASS INDEX: 31.34 KG/M2 | RESPIRATION RATE: 16 BRPM | HEIGHT: 66 IN

## 2023-04-03 DIAGNOSIS — M79.644 THUMB PAIN, RIGHT: Primary | ICD-10-CM

## 2023-04-03 NOTE — PROGRESS NOTES
use of protective splints, activity modification, and the judicious use of over-the-counter anti-inflammatory medications if allowed by her primary care physician. An appropriately sized Removable forearm based Wrist orthosis was provided with hopes that this brace without not cause dorsal pressure. Instructions were given regarding splint use and wear as well as suggestions for use of the other modalities were discussed. I have clearly explained to her that the above outlined treatment plan should not be expected to 'cure' her  DeQuervain's Tenosynovitis, but we are rather treating the symptoms with which she presents. Ms. Fang Mata  voiced an appropriate understanding of our discussion, the options available to her, and of the expectations of her selected  treatment. I have also discussed with Ms. Fang Mata  the other treatment options available to her  for this condition. We have today selected to proceed with conservative management. She and I have agreed that if our current course of conservative treatment does not prove to be effective over the short term future, that she will schedule a follow-up appointment to discuss and select an alternate course of therapy including possibly injection or surgical treatment. I have asked Ms. Fang Mata  to feel free to contact me or schedule a follow-up appointment at any time that she feels the need for any further evaluation or treatment for her upper extremitiy condition. If she feels that she continues to be feeling and functioning well, she may choose not to seek any further follow-up or treatment at her discretion. I will remain available to continue her care at any time in the future.

## 2023-04-03 NOTE — Clinical Note
Dear  Mu Morales MD,  Thank you very much for your referral or Ms. Julio Rey to me for evaluation and treatment of her Hand & Wrist condition. I appreciate your confidence in me and thank you for allowing me the opportunity to care for your patients. If I can be of any further assistance to you on this or any other patient, please do not hesitate to contact me. Sincerely,  Miriam Hernandez.  Hayes Knowles MD

## 2023-04-20 DIAGNOSIS — M54.6 ACUTE RIGHT-SIDED THORACIC BACK PAIN: ICD-10-CM

## 2023-04-20 DIAGNOSIS — R51.9 FACIAL PAIN, ACUTE: ICD-10-CM

## 2023-04-20 DIAGNOSIS — J30.2 SEASONAL ALLERGIC RHINITIS, UNSPECIFIED TRIGGER: ICD-10-CM

## 2023-04-21 RX ORDER — LORATADINE 10 MG/1
10 TABLET ORAL DAILY
Qty: 30 TABLET | Refills: 5 | Status: SHIPPED | OUTPATIENT
Start: 2023-04-21

## 2023-04-21 RX ORDER — TIZANIDINE 4 MG/1
4 TABLET ORAL NIGHTLY PRN
Qty: 10 TABLET | Refills: 0 | Status: SHIPPED | OUTPATIENT
Start: 2023-04-21

## 2023-04-21 RX ORDER — FLUTICASONE PROPIONATE 50 MCG
2 SPRAY, SUSPENSION (ML) NASAL DAILY
Qty: 16 G | Refills: 0 | Status: SHIPPED | OUTPATIENT
Start: 2023-04-21

## 2023-04-25 ENCOUNTER — OFFICE VISIT (OUTPATIENT)
Dept: BEHAVIORAL/MENTAL HEALTH CLINIC | Age: 33
End: 2023-04-25

## 2023-04-25 DIAGNOSIS — F43.10 PTSD (POST-TRAUMATIC STRESS DISORDER): Primary | ICD-10-CM

## 2023-04-25 NOTE — PROGRESS NOTES
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 Today: Bhavna Farr reported 2 siblings were addicted to heroin, father was alcoholic, family has some  descent which causes Bhavna Farr to remain aware of genetic risks. Bhavna Farr has been abstaining from alcohol recently. Was down for 3 weeks with pneumonia, feeling unsupported at home. Therapist discussed current readiness and indications for Bhavna Farr and Spouse to transition to couples therapy. Re-offered names and contact info for couples therapists. Progress in Treatment: (4/25/23)  Focused on goal reduce depression: Presence and empathy for Adamaris's emotions and thoughts with daily living difficulties, parenting, household, and primary attachment relationship stresses. Planned for time outside role of mom and homemaker. Prompted Bhavna Farr to identify others who are supportive of her intentions and abilities. Focused on goal process and heal trauma: Discussed spousal differences with financial and survival needs, with poverty and middle class cultures. Sense-making with Adamaris's survival level distress at financial decisions. Empathic conjecture with Adamaris's distress, to include sense of self-leadership. Activity to increase Adamaris's insight into her own growth and developmental stage. Focused on goal healthy relationship skills: Discussed parenting response to 1 yr old natural curiosity with her own body parts. Activity for awareness of effects of prolonged resentment. Increased sense of autonomy and ability to meet own needs. Initially Presented Concerns:   Julio Rey is a 27 y. o. who was by her primary care physician, Mu Morales MD, due to concerns about depression and post-traumatic

## 2023-05-02 ENCOUNTER — OFFICE VISIT (OUTPATIENT)
Dept: ORTHOPEDIC SURGERY | Age: 33
End: 2023-05-02

## 2023-05-02 VITALS — BODY MASS INDEX: 31.34 KG/M2 | WEIGHT: 195 LBS | HEIGHT: 66 IN

## 2023-05-02 DIAGNOSIS — M43.02 CERVICAL SPONDYLOLYSIS: Primary | ICD-10-CM

## 2023-05-02 DIAGNOSIS — M54.2 NECK PAIN: ICD-10-CM
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
## 2023-05-09 ENCOUNTER — OFFICE VISIT (OUTPATIENT)
Dept: BEHAVIORAL/MENTAL HEALTH CLINIC | Age: 33
End: 2023-05-09

## 2023-05-09 DIAGNOSIS — F43.10 PTSD (POST-TRAUMATIC STRESS DISORDER): Primary | ICD-10-CM
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
## 2023-05-17 DIAGNOSIS — N39.41 URGENCY INCONTINENCE: ICD-10-CM

## 2023-05-17 RX ORDER — OXYBUTYNIN CHLORIDE 10 MG/1
10 TABLET, EXTENDED RELEASE ORAL 2 TIMES DAILY
Qty: 180 TABLET | Refills: 1 | Status: SHIPPED | OUTPATIENT
Start: 2023-05-17

## 2023-05-31 ENCOUNTER — HOSPITAL ENCOUNTER (OUTPATIENT)
Dept: PHYSICAL THERAPY | Age: 33
Setting detail: THERAPIES SERIES
Discharge: HOME | End: 2023-05-31

## 2023-05-31 NOTE — PROGRESS NOTES
[address]     Physical Therapy  Cancellation/No-show Note  Patient Name:  Chad Ying  :  1990   Date:  2023  Cancelled visits to date: 1  No-shows to date: 0    Patient status for today's appointment patient:  [x]  Cancelled: eval  []  Rescheduled appointment  []  No-show     Reason given by patient:  [x]  Patient ill  []  Conflicting appointment  []  No transportation    []  Conflict with work  []  No reason given  []  Other:     Comments:      Phone call information:   []  Phone call made today to patient at _ time at number provided:      []  Patient answered, conversation as follows:    []  Patient did not answer, message left as follows:  []  Phone call not made today  [x]  Phone call not needed - pt contacted us to cancel and provided reason for cancellation.      Electronically signed by:  Sallie Smith, PT, DPT

## 2023-06-06 ENCOUNTER — TELEPHONE (OUTPATIENT)
Dept: BEHAVIORAL/MENTAL HEALTH CLINIC | Age: 33
End: 2023-06-06

## 2023-06-06 NOTE — TELEPHONE ENCOUNTER
Spoke with VLAD Frederick as [employer] visual and audio did not work for her virtual appt. VLAD Frederick could be seen and heard online, but she could not see or hear Clinician. VLAD Frederick reported partial remission of depressive symptoms at this time. At [facility] request, today's appointment was cancelled and rescheduled for July.

## 2023-06-23 DIAGNOSIS — E03.9 ACQUIRED HYPOTHYROIDISM: ICD-10-CM

## 2023-06-23 DIAGNOSIS — K21.9 GASTROESOPHAGEAL REFLUX DISEASE WITHOUT ESOPHAGITIS: ICD-10-CM

## 2023-06-23 RX ORDER — LEVOTHYROXINE SODIUM 0.03 MG/1
TABLET ORAL
Qty: 60 TABLET | Refills: 2 | Status: SHIPPED | OUTPATIENT
Start: 2023-06-23

## 2023-06-23 RX ORDER — PANTOPRAZOLE SODIUM 40 MG/1
TABLET, DELAYED RELEASE ORAL
Qty: 60 TABLET | Refills: 5 | Status: SHIPPED | OUTPATIENT
Start: 2023-06-23

## 2023-06-24 ENCOUNTER — HOSPITAL ENCOUNTER (EMERGENCY)
Age: 33
Discharge: HOME | End: 2023-06-25
Payer: COMMERCIAL

## 2023-06-24 VITALS
WEIGHT: 196 LBS | TEMPERATURE: 97.3 F | HEART RATE: 111 BPM | OXYGEN SATURATION: 96 % | RESPIRATION RATE: 18 BRPM | BODY MASS INDEX: 31.64 KG/M2 | DIASTOLIC BLOOD PRESSURE: 88 MMHG | SYSTOLIC BLOOD PRESSURE: 130 MMHG

## 2023-06-24 DIAGNOSIS — R11.2 NAUSEA VOMITING AND DIARRHEA: Primary | ICD-10-CM

## 2023-06-24 DIAGNOSIS — R19.7 NAUSEA VOMITING AND DIARRHEA: Primary | ICD-10-CM

## 2023-06-24 LAB
ALBUMIN SERPL-MCNC: 4.6 G/DL (ref 3.4–5)
ALBUMIN/GLOB SERPL: 1.4 {RATIO} (ref 1.1–2.2)
ALP SERPL-CCNC: 99 U/L (ref 40–129)
ALT SERPL-CCNC: 23 U/L (ref 10–40)
ANION GAP SERPL CALCULATED.3IONS-SCNC: 16 MMOL/L (ref 3–16)
AST SERPL-CCNC: 19 U/L (ref 15–37)
BACTERIA URNS QL MICRO: ABNORMAL /HPF
BASOPHILS # BLD: 0 K/UL (ref 0–0.2)
BASOPHILS NFR BLD: 0.1 %
BILIRUB SERPL-MCNC: 0.4 MG/DL (ref 0–1)
BILIRUB UR QL STRIP.AUTO: NEGATIVE
BUN SERPL-MCNC: 10 MG/DL (ref 7–20)
CALCIUM SERPL-MCNC: 9.4 MG/DL (ref 8.3–10.6)
CHLORIDE SERPL-SCNC: 101 MMOL/L (ref 99–110)
CLARITY UR: CLEAR
CO2 SERPL-SCNC: 22 MMOL/L (ref 21–32)
COLOR UR: ABNORMAL
CREAT SERPL-MCNC: 0.5 MG/DL (ref 0.6–1.1)
DEPRECATED RDW RBC AUTO: 13.6 % (ref 12.4–15.4)
EOSINOPHIL # BLD: 0 K/UL (ref 0–0.6)
EOSINOPHIL NFR BLD: 0.4 %
EPI CELLS #/AREA URNS AUTO: 8 /HPF (ref 0–5)
GFR SERPLBLD CREATININE-BSD FMLA CKD-EPI: >60 ML/MIN/{1.73_M2}
GLUCOSE SERPL-MCNC: 137 MG/DL (ref 70–99)
GLUCOSE UR STRIP.AUTO-MCNC: NEGATIVE MG/DL
HCG SERPL QL: NEGATIVE
HCT VFR BLD AUTO: 49.1 % (ref 36–48)
HGB BLD-MCNC: 16.2 G/DL (ref 12–16)
HGB UR QL STRIP.AUTO: NEGATIVE
HYALINE CASTS #/AREA URNS AUTO: 3 /LPF (ref 0–8)
KETONES UR STRIP.AUTO-MCNC: 40 MG/DL
LEUKOCYTE ESTERASE UR QL STRIP.AUTO: NEGATIVE
LIPASE SERPL-CCNC: 22 U/L (ref 13–60)
LYMPHOCYTES # BLD: 0.5 K/UL (ref 1–5.1)
LYMPHOCYTES NFR BLD: 4.4 %
MCH RBC QN AUTO: 28 PG (ref 26–34)
MCHC RBC AUTO-ENTMCNC: 32.9 G/DL (ref 31–36)
MCV RBC AUTO: 84.9 FL (ref 80–100)
MONOCYTES # BLD: 0.3 K/UL (ref 0–1.3)
MONOCYTES NFR BLD: 2.8 %
NEUTROPHILS # BLD: 11 K/UL (ref 1.7–7.7)
NEUTROPHILS NFR BLD: 92.3 %
NITRITE UR QL STRIP.AUTO: NEGATIVE
PH UR STRIP.AUTO: 6.5 [PH] (ref 5–8)
PLATELET # BLD AUTO: 277 K/UL (ref 135–450)
PMV BLD AUTO: 8.4 FL (ref 5–10.5)
POTASSIUM SERPL-SCNC: 4.2 MMOL/L (ref 3.5–5.1)
PROT SERPL-MCNC: 7.8 G/DL (ref 6.4–8.2)
PROT UR STRIP.AUTO-MCNC: 30 MG/DL
RBC # BLD AUTO: 5.78 M/UL (ref 4–5.2)
RBC CLUMPS #/AREA URNS AUTO: 2 /HPF (ref 0–4)
SODIUM SERPL-SCNC: 139 MMOL/L (ref 136–145)
SP GR UR STRIP.AUTO: >=1.03 (ref 1–1.03)
UA COMPLETE W REFLEX CULTURE PNL UR: ABNORMAL
UA DIPSTICK W REFLEX MICRO PNL UR: YES
URN SPEC COLLECT METH UR: ABNORMAL
UROBILINOGEN UR STRIP-ACNC: 1 E.U./DL
WBC # BLD AUTO: 11.9 K/UL (ref 4–11)
WBC #/AREA URNS AUTO: 2 /HPF (ref 0–5)

## 2023-06-24 PROCEDURE — 83690 ASSAY OF LIPASE: CPT

## 2023-06-24 PROCEDURE — 80053 COMPREHEN METABOLIC PANEL: CPT

## 2023-06-24 PROCEDURE — 81001 URINALYSIS AUTO W/SCOPE: CPT

## 2023-06-24 PROCEDURE — 6370000000 HC RX 637 (ALT 250 FOR IP): Performed by: NURSE PRACTITIONER

## 2023-06-24 PROCEDURE — 99283 EMERGENCY DEPT VISIT LOW MDM: CPT

## 2023-06-24 PROCEDURE — 84703 CHORIONIC GONADOTROPIN ASSAY: CPT

## 2023-06-24 PROCEDURE — 85025 COMPLETE CBC W/AUTO DIFF WBC: CPT

## 2023-06-24 RX ORDER — 0.9 % SODIUM CHLORIDE 0.9 %
1000 INTRAVENOUS SOLUTION INTRAVENOUS ONCE
Status: DISCONTINUED | OUTPATIENT
Start: 2023-06-24 | End: 2023-06-25

## 2023-06-24 RX ORDER — ONDANSETRON 4 MG/1
4 TABLET, ORALLY DISINTEGRATING ORAL ONCE
Status: COMPLETED | OUTPATIENT
Start: 2023-06-24 | End: 2023-06-24

## 2023-06-24 RX ADMIN — ONDANSETRON 4 MG: 4 TABLET, ORALLY DISINTEGRATING ORAL at 23:05

## 2023-06-24 ASSESSMENT — PAIN SCALES - GENERAL: PAINLEVEL_OUTOF10: 3

## 2023-06-24 ASSESSMENT — ENCOUNTER SYMPTOMS
VOMITING: 1
CHEST TIGHTNESS: 0
NAUSEA: 1
DIARRHEA: 1
ABDOMINAL PAIN: 1
SHORTNESS OF BREATH: 0

## 2023-06-24 ASSESSMENT — PAIN - FUNCTIONAL ASSESSMENT: PAIN_FUNCTIONAL_ASSESSMENT: 0-10

## 2023-06-25 RX ORDER — ONDANSETRON 4 MG/1
4 TABLET, FILM COATED ORAL EVERY 8 HOURS PRN
Qty: 20 TABLET | Refills: 0 | Status: SHIPPED | OUTPATIENT
Start: 2023-06-25

## 2023-06-29 DIAGNOSIS — J45.40 MODERATE PERSISTENT ASTHMA WITHOUT COMPLICATION: ICD-10-CM

## 2023-06-29 RX ORDER — ALBUTEROL SULFATE 90 UG/1
2 AEROSOL, METERED RESPIRATORY (INHALATION) DAILY PRN
Qty: 1 EACH | Refills: 2 | Status: SHIPPED | OUTPATIENT
Start: 2023-06-29

## 2023-06-29 RX ORDER — FLUTICASONE PROPIONATE AND SALMETEROL XINAFOATE 45; 21 UG/1; UG/1
2 AEROSOL, METERED RESPIRATORY (INHALATION) 2 TIMES DAILY
Qty: 1 EACH | Refills: 3 | Status: SHIPPED | OUTPATIENT
Start: 2023-06-29

## 2023-07-25 ENCOUNTER — OFFICE VISIT (OUTPATIENT)
Dept: BEHAVIORAL/MENTAL HEALTH CLINIC | Age: 33
End: 2023-07-25
Payer: COMMERCIAL

## 2023-07-25 DIAGNOSIS — F43.10 PTSD (POST-TRAUMATIC STRESS DISORDER): Primary | ICD-10-CM

## 2023-07-25 PROCEDURE — 1036F TOBACCO NON-USER: CPT

## 2023-07-25 PROCEDURE — 90837 PSYTX W PT 60 MINUTES: CPT

## 2023-07-25 NOTE — PROGRESS NOTES
delusions   Perceptions: No perceptual disturbance   Behavior:   Open   Psychomotor: Within normal limits   Speech: Within normal limits   Eye Contact: Within normal limits   Orientation:  oriented to person, place, time, and general circumstances   Judgment & Insight:  Improving insight and judgment     Risk Assessment:  Current Suicide Risk:  No suicidal ideation  Current Homicide Risk:  no homicidal ideation  Duncan Daley disclosed past suicidal ideation, but not plan or intent. Diagnosis:   Diagnosis Orders   1. PTSD (post-traumatic stress disorder)            Initial Assessment, Impressions and Plan:  Duncan Daley is a 26 y.o. old female who presented with moderate depressive and post-traumatic stress symptoms that are interfering with her family and social functioning. Will benefit from TF-CBT to reduce depression and to process and heal trauma. Grief support in loss of father. DBT for emotional regulation and healthy relationship skills. Contact Ekaterina Moses  at this office as needed.

## 2023-07-28 DIAGNOSIS — G25.81 RESTLESS LEGS SYNDROME (RLS): ICD-10-CM

## 2023-07-28 RX ORDER — GABAPENTIN 400 MG/1
CAPSULE ORAL
Qty: 30 CAPSULE | Refills: 3 | OUTPATIENT
Start: 2023-07-28

## 2023-07-28 RX ORDER — GABAPENTIN 400 MG/1
CAPSULE ORAL
Qty: 30 CAPSULE | Refills: 3 | Status: SHIPPED | OUTPATIENT
Start: 2023-07-28 | End: 2023-11-27

## 2023-08-29 ENCOUNTER — OFFICE VISIT (OUTPATIENT)
Dept: BEHAVIORAL/MENTAL HEALTH CLINIC | Age: 33
End: 2023-08-29

## 2023-08-29 DIAGNOSIS — F43.10 PTSD (POST-TRAUMATIC STRESS DISORDER): Primary | ICD-10-CM
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
## 2023-09-28 ENCOUNTER — OFFICE VISIT (OUTPATIENT)
Dept: FAMILY MEDICINE CLINIC | Age: 33
End: 2023-09-28
Payer: COMMERCIAL

## 2023-09-28 VITALS
DIASTOLIC BLOOD PRESSURE: 86 MMHG | WEIGHT: 198 LBS | RESPIRATION RATE: 14 BRPM | BODY MASS INDEX: 31.82 KG/M2 | OXYGEN SATURATION: 98 % | SYSTOLIC BLOOD PRESSURE: 134 MMHG | HEIGHT: 66 IN | HEART RATE: 90 BPM

## 2023-09-28 DIAGNOSIS — J45.40 MODERATE PERSISTENT ASTHMA WITHOUT COMPLICATION: Primary | ICD-10-CM

## 2023-09-28 DIAGNOSIS — R73.03 PREDIABETES: ICD-10-CM

## 2023-09-28 DIAGNOSIS — G47.19 EXCESSIVE DAYTIME SLEEPINESS: ICD-10-CM

## 2023-09-28 DIAGNOSIS — H91.90 HEARING LOSS, UNSPECIFIED HEARING LOSS TYPE, UNSPECIFIED LATERALITY: ICD-10-CM

## 2023-09-28 DIAGNOSIS — Z91.013 SHELLFISH ALLERGY: ICD-10-CM

## 2023-09-28 DIAGNOSIS — E55.9 VITAMIN D DEFICIENCY: ICD-10-CM

## 2023-09-28 DIAGNOSIS — E03.9 ACQUIRED HYPOTHYROIDISM: ICD-10-CM

## 2023-09-28 LAB — HBA1C MFR BLD: 5.6 %

## 2023-09-28 PROCEDURE — G8417 CALC BMI ABV UP PARAM F/U: HCPCS | Performed by: FAMILY MEDICINE

## 2023-09-28 PROCEDURE — 1036F TOBACCO NON-USER: CPT | Performed by: FAMILY MEDICINE

## 2023-09-28 PROCEDURE — G8427 DOCREV CUR MEDS BY ELIG CLIN: HCPCS | Performed by: FAMILY MEDICINE

## 2023-09-28 PROCEDURE — 99213 OFFICE O/P EST LOW 20 MIN: CPT | Performed by: FAMILY MEDICINE

## 2023-09-28 PROCEDURE — 83036 HEMOGLOBIN GLYCOSYLATED A1C: CPT | Performed by: FAMILY MEDICINE

## 2023-09-28 RX ORDER — EPINEPHRINE 0.3 MG/.3ML
0.3 INJECTION SUBCUTANEOUS ONCE
Qty: 0.3 ML | Refills: 0 | Status: SHIPPED | OUTPATIENT
Start: 2023-09-28 | End: 2023-09-28

## 2023-09-28 NOTE — PROGRESS NOTES
CHRONIC CONDITION FOLLOW-UP     Assessment and Plan:      Diagnosis Orders   1. Moderate persistent asthma without complication        2. Shellfish allergy        3. Vitamin D deficiency        4. Acquired hypothyroidism        5. Excessive daytime sleepiness        Stable     Continue current Tx plan. Any changes marked below. INSTRUCTIONS  NEXT APPOINTMENT: Please schedule annual complete physical (30 minutes) in 6 months with Dr. Darlene Angel or her NP, Nacho Landaverde. PLEASE TAKE THIS FORM TO CHECK-OUT WINDOW TO SCHEDULE NEXT VISIT. Please get annual flu and covid vaccines when available in fall. Can get at stores such as [employer]. Also, get a Prevnar 20 pneumonia vaccine. Reduce hydroxyzine by half. Can continue melatonin. Work on weight loss. See audiology. Subjective:      Chief Complaint   Patient presents with    Medication Check     Didi Moise is an 26 y.o. female who presents for follow up    Complaints:   Asthma doing better on preventive Advair  Still often sleepy. On hydroxyzine over a year. Needing caffeine. Had sleep study when 25 lbs heavier but gained 10 lbs heavier. CHART REVIEW   reports that she has never smoked. She has never used smokeless tobacco.  Health Maintenance Due   Topic Date Due    COVID-19 Vaccine (1) Never done    Pneumococcal 0-64 years Vaccine (2 - PCV) 12/12/2013    A1C test (Diabetic or Prediabetic)  04/19/2023    Flu vaccine (1) 08/01/2023     Current Outpatient Medications   Medication Instructions    albuterol (PROVENTIL) 2.5 mg, Nebulization, EVERY 6 HOURS PRN    albuterol sulfate HFA (VENTOLIN HFA) 108 (90 Base) MCG/ACT inhaler 2 puffs, Inhalation, DAILY PRN    atomoxetine (STRATTERA) 10 MG capsule No dose, route, or frequency recorded.     dicyclomine (BENTYL) 20 MG tablet TAKE ONE TABLET BY MOUTH FOUR TIMES A DAY    EPINEPHrine (EPIPEN 2-KATIANA) 0.3 mg, IntraMUSCular, ONCE, Use as directed for allergic reaction    fluticasone (FLONASE) 50 MCG/ACT

## 2023-09-28 NOTE — PATIENT INSTRUCTIONS
INSTRUCTIONS  NEXT APPOINTMENT: Please schedule annual complete physical (30 minutes) in 6 months with Dr. Fredo Peterson or her NP, Radha Hernandez. PLEASE TAKE THIS FORM TO CHECK-OUT WINDOW TO SCHEDULE NEXT VISIT. Please get annual flu and covid vaccines when available in fall. Can get at stores such as [employer]. Also, get a Prevnar 20 pneumonia vaccine. Reduce hydroxyzine by half. Can continue melatonin. Work on weight loss. See audiology.

## 2023-09-29 ENCOUNTER — OFFICE VISIT (OUTPATIENT)
Dept: BEHAVIORAL/MENTAL HEALTH CLINIC | Age: 33
End: 2023-09-29
Payer: COMMERCIAL

## 2023-09-29 DIAGNOSIS — F43.10 PTSD (POST-TRAUMATIC STRESS DISORDER): Primary | ICD-10-CM

## 2023-09-29 PROCEDURE — 90837 PSYTX W PT 60 MINUTES: CPT

## 2023-09-29 PROCEDURE — 1036F TOBACCO NON-USER: CPT
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
## 2023-10-19 NOTE — PLAN OF CARE
Problem: Depressive Behavior With or Without Suicide Precautions:  Goal: Able to verbalize acceptance of life and situations over which he or she has no control  Description: Able to verbalize acceptance of life and situations over which he or she has no control  Outcome: Ongoing    Problem: Depressive Behavior With or Without Suicide Precautions:  Goal: Able to verbalize and/or display a decrease in depressive symptoms  Description: Able to verbalize and/or display a decrease in depressive symptoms  Outcome: Ongoing   Patient has been visible out on the unit off and on through the shift. She is A&Ox4 and currently denies that she is having thoughts to want to harm self or others at this time. She denies that she is experiencing hallucinations. She did not make any delusional statements during interaction with staff. She was cooperative with taking medications and went to some groups. She is social with select peers on the unit. She has been no issue on the unit. Procedure End

## 2023-11-10 ENCOUNTER — OFFICE VISIT (OUTPATIENT)
Dept: BEHAVIORAL/MENTAL HEALTH CLINIC | Age: 33
End: 2023-11-10
Payer: COMMERCIAL

## 2023-11-10 DIAGNOSIS — F32.A DEPRESSIVE DISORDER: ICD-10-CM

## 2023-11-10 DIAGNOSIS — F43.10 PTSD (POST-TRAUMATIC STRESS DISORDER): Primary | ICD-10-CM

## 2023-11-10 PROCEDURE — 90837 PSYTX W PT 60 MINUTES: CPT

## 2023-11-10 PROCEDURE — 1036F TOBACCO NON-USER: CPT
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
## 2023-11-13 ENCOUNTER — APPOINTMENT (OUTPATIENT)
Dept: GENERAL RADIOLOGY | Age: 33
End: 2023-11-13
Payer: COMMERCIAL

## 2023-11-13 ENCOUNTER — HOSPITAL ENCOUNTER (EMERGENCY)
Age: 33
Discharge: HOME | End: 2023-11-13
Payer: COMMERCIAL

## 2023-11-13 VITALS
WEIGHT: 196 LBS | HEART RATE: 98 BPM | DIASTOLIC BLOOD PRESSURE: 84 MMHG | HEIGHT: 66 IN | BODY MASS INDEX: 31.5 KG/M2 | OXYGEN SATURATION: 100 % | SYSTOLIC BLOOD PRESSURE: 170 MMHG | RESPIRATION RATE: 16 BRPM | TEMPERATURE: 98 F

## 2023-11-13 DIAGNOSIS — S63.601A SPRAIN OF RIGHT THUMB, UNSPECIFIED SITE OF DIGIT, INITIAL ENCOUNTER: Primary | ICD-10-CM

## 2023-11-13 PROCEDURE — 73140 X-RAY EXAM OF FINGER(S): CPT

## 2023-11-13 PROCEDURE — 29125 APPL SHORT ARM SPLINT STATIC: CPT

## 2023-11-13 PROCEDURE — 6370000000 HC RX 637 (ALT 250 FOR IP): Performed by: PHYSICIAN ASSISTANT

## 2023-11-13 PROCEDURE — 99283 EMERGENCY DEPT VISIT LOW MDM: CPT

## 2023-11-13 RX ORDER — NAPROXEN 500 MG/1
500 TABLET ORAL 2 TIMES DAILY WITH MEALS
Qty: 60 TABLET | Refills: 0 | Status: SHIPPED | OUTPATIENT
Start: 2023-11-13

## 2023-11-13 RX ORDER — IBUPROFEN 800 MG/1
800 TABLET ORAL ONCE
Status: COMPLETED | OUTPATIENT
Start: 2023-11-13 | End: 2023-11-13

## 2023-11-13 RX ADMIN — IBUPROFEN 800 MG: 800 TABLET, FILM COATED ORAL at 15:41

## 2023-11-13 ASSESSMENT — ENCOUNTER SYMPTOMS
CHEST TIGHTNESS: 0
SHORTNESS OF BREATH: 0
DIARRHEA: 0
VOMITING: 0
NAUSEA: 0
ABDOMINAL PAIN: 0

## 2023-11-13 ASSESSMENT — PAIN - FUNCTIONAL ASSESSMENT: PAIN_FUNCTIONAL_ASSESSMENT: 0-10

## 2023-11-13 ASSESSMENT — PAIN DESCRIPTION - ORIENTATION: ORIENTATION: RIGHT

## 2023-11-13 ASSESSMENT — PAIN SCALES - GENERAL: PAINLEVEL_OUTOF10: 7

## 2023-11-13 ASSESSMENT — PAIN DESCRIPTION - DESCRIPTORS: DESCRIPTORS: ACHING

## 2023-11-13 ASSESSMENT — PAIN DESCRIPTION - LOCATION: LOCATION: HAND

## 2023-11-13 NOTE — ED PROVIDER NOTES
[facility]        Pt Name: Melva Nichols  MRN: [id number]  [address] [place] 1990  Date of evaluation: 2023  Provider: Jason Mares PA-C  PCP: Alex Mike MD  Note Started: 3:49 PM EST 23      SHAYY. I have evaluated this patient. CHIEF COMPLAINT       Chief Complaint   Patient presents with    Hand Pain     Table fell on right thumb. HISTORY OF PRESENT ILLNESS: 1 or more Elements     History from : Patient    Limitations to history : None    Melva Nichols is a 26 y.o. female who presents to the emergency department today complaining of right thumb pain stating just prior to arrival she was putting up tables at school when a table fell on her right thumb. Patient denies injury to the remainder of her right hand or wrist.  She denies numbness or tingling. Nursing Notes were all reviewed and agreed with or any disagreements were addressed in the HPI. REVIEW OF SYSTEMS :      Review of Systems   Constitutional:  Negative for chills and fever. Respiratory:  Negative for chest tightness and shortness of breath. Cardiovascular:  Negative for chest pain. Gastrointestinal:  Negative for abdominal pain, diarrhea, nausea and vomiting. Genitourinary:  Negative for dysuria. Musculoskeletal:  Positive for arthralgias. Neurological:  Negative for numbness. All other systems reviewed and are negative. Positives and Pertinent negatives as per HPI. SURGICAL HISTORY     Past Surgical History:   Procedure Laterality Date    BREAST SURGERY  2007    reduction     SECTION      LTCS for suspected macrosomia and PIH.        CURRENTMEDICATIONS       Previous Medications    ALBUTEROL (PROVENTIL) (2.5 MG/3ML) 0.083% NEBULIZER SOLUTION    Take 3 mLs by nebulization every 6 hours as needed for Wheezing    ALBUTEROL SULFATE HFA (VENTOLIN HFA) 108 (90 BASE) MCG/ACT INHALER    Inhale 2 puffs into the

## 2023-11-22 DIAGNOSIS — N39.41 URGENCY INCONTINENCE: ICD-10-CM

## 2023-11-22 DIAGNOSIS — G25.81 RESTLESS LEGS SYNDROME (RLS): ICD-10-CM

## 2023-11-22 RX ORDER — OXYBUTYNIN CHLORIDE 10 MG/1
10 TABLET, EXTENDED RELEASE ORAL 2 TIMES DAILY
Qty: 180 TABLET | Refills: 1 | Status: SHIPPED | OUTPATIENT
Start: 2023-11-22

## 2023-11-22 RX ORDER — GABAPENTIN 400 MG/1
CAPSULE ORAL
Qty: 30 CAPSULE | Refills: 5 | Status: SHIPPED | OUTPATIENT
Start: 2023-11-22 | End: 2024-05-20

## 2023-11-26 DIAGNOSIS — J30.2 SEASONAL ALLERGIC RHINITIS, UNSPECIFIED TRIGGER: ICD-10-CM

## 2023-11-26 DIAGNOSIS — G25.81 RESTLESS LEGS SYNDROME (RLS): ICD-10-CM

## 2023-11-27 RX ORDER — LORATADINE 10 MG/1
10 TABLET ORAL DAILY
Qty: 30 TABLET | Refills: 5 | Status: SHIPPED | OUTPATIENT
Start: 2023-11-27

## 2023-11-27 RX ORDER — GABAPENTIN 400 MG/1
400 CAPSULE ORAL NIGHTLY
Qty: 30 CAPSULE | Refills: 5 | OUTPATIENT
Start: 2023-11-27 | End: 2024-05-25

## 2023-11-28 ENCOUNTER — TELEMEDICINE (OUTPATIENT)
Dept: BEHAVIORAL/MENTAL HEALTH CLINIC | Age: 33
End: 2023-11-28
Payer: COMMERCIAL

## 2023-11-28 DIAGNOSIS — F31.9 BIPOLAR 1 DISORDER, DEPRESSED (HCC): ICD-10-CM

## 2023-11-28 DIAGNOSIS — F43.10 PTSD (POST-TRAUMATIC STRESS DISORDER): Primary | ICD-10-CM

## 2023-11-28 DIAGNOSIS — G25.81 RESTLESS LEGS SYNDROME (RLS): ICD-10-CM

## 2023-11-28 PROCEDURE — 90837 PSYTX W PT 60 MINUTES: CPT

## 2023-11-28 PROCEDURE — 1036F TOBACCO NON-USER: CPT

## 2023-11-28 RX ORDER — GABAPENTIN 400 MG/1
400 CAPSULE ORAL NIGHTLY
Qty: 30 CAPSULE | Refills: 5 | OUTPATIENT
Start: 2023-11-28 | End: 2024-05-26
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
## 2023-12-18 DIAGNOSIS — J45.40 MODERATE PERSISTENT ASTHMA WITHOUT COMPLICATION: Primary | ICD-10-CM

## 2023-12-18 RX ORDER — FLUTICASONE PROPIONATE AND SALMETEROL 113; 14 UG/1; UG/1
POWDER, METERED RESPIRATORY (INHALATION)
Qty: 1 EACH | Refills: 2 | Status: SHIPPED | OUTPATIENT
Start: 2023-12-18

## 2023-12-27 DIAGNOSIS — G25.81 RESTLESS LEGS SYNDROME (RLS): ICD-10-CM

## 2023-12-27 DIAGNOSIS — G47.00 INSOMNIA, UNSPECIFIED TYPE: ICD-10-CM

## 2023-12-27 RX ORDER — GABAPENTIN 400 MG/1
400 CAPSULE ORAL NIGHTLY
Qty: 30 CAPSULE | Refills: 5 | Status: SHIPPED | OUTPATIENT
Start: 2023-12-27 | End: 2023-12-28 | Stop reason: SDUPTHER

## 2023-12-27 RX ORDER — LANOLIN ALCOHOL/MO/W.PET/CERES
3 CREAM (GRAM) TOPICAL DAILY
Qty: 90 TABLET | Refills: 1 | Status: SHIPPED | OUTPATIENT
Start: 2023-12-27 | End: 2023-12-28 | Stop reason: SDUPTHER

## 2023-12-28 DIAGNOSIS — G47.00 INSOMNIA, UNSPECIFIED TYPE: ICD-10-CM

## 2023-12-28 DIAGNOSIS — G25.81 RESTLESS LEGS SYNDROME (RLS): ICD-10-CM

## 2023-12-28 RX ORDER — GABAPENTIN 400 MG/1
400 CAPSULE ORAL NIGHTLY
Qty: 30 CAPSULE | Refills: 5 | Status: SHIPPED | OUTPATIENT
Start: 2023-12-28 | End: 2024-06-25

## 2023-12-28 RX ORDER — LANOLIN ALCOHOL/MO/W.PET/CERES
3 CREAM (GRAM) TOPICAL DAILY
Qty: 90 TABLET | Refills: 1 | Status: SHIPPED | OUTPATIENT
Start: 2023-12-28

## 2024-02-24 ENCOUNTER — APPOINTMENT (OUTPATIENT)
Dept: GENERAL RADIOLOGY | Age: 34
End: 2024-02-24

## 2024-02-24 ENCOUNTER — HOSPITAL ENCOUNTER (EMERGENCY)
Age: 34
Discharge: HOME | End: 2024-02-24
Attending: EMERGENCY MEDICINE

## 2024-02-24 VITALS
DIASTOLIC BLOOD PRESSURE: 90 MMHG | TEMPERATURE: 97.7 F | BODY MASS INDEX: 32.56 KG/M2 | SYSTOLIC BLOOD PRESSURE: 123 MMHG | WEIGHT: 202.6 LBS | RESPIRATION RATE: 20 BRPM | HEART RATE: 93 BPM | OXYGEN SATURATION: 97 % | HEIGHT: 66 IN

## 2024-02-24 DIAGNOSIS — R05.9 PAIN AGGRAVATED BY COUGHING AND DEEP BREATHING: ICD-10-CM

## 2024-02-24 DIAGNOSIS — J10.1 INFLUENZA A: ICD-10-CM

## 2024-02-24 DIAGNOSIS — R52 PAIN AGGRAVATED BY COUGHING AND DEEP BREATHING: ICD-10-CM

## 2024-02-24 DIAGNOSIS — J45.21 MILD INTERMITTENT ASTHMA WITH ACUTE EXACERBATION: Primary | ICD-10-CM

## 2024-02-24 PROCEDURE — 71046 X-RAY EXAM CHEST 2 VIEWS: CPT

## 2024-02-24 PROCEDURE — 99283 EMERGENCY DEPT VISIT LOW MDM: CPT

## 2024-02-24 RX ORDER — PREDNISONE 20 MG/1
40 TABLET ORAL DAILY
Qty: 10 TABLET | Refills: 0 | Status: SHIPPED | OUTPATIENT
Start: 2024-02-24 | End: 2024-02-29

## 2024-02-24 RX ORDER — AMOXICILLIN 875 MG/1
875 TABLET, COATED ORAL 2 TIMES DAILY
Qty: 20 TABLET | Refills: 0 | Status: SHIPPED | OUTPATIENT
Start: 2024-02-24 | End: 2024-03-05

## 2024-02-24 ASSESSMENT — PAIN SCALES - GENERAL
PAINLEVEL_OUTOF10: 2
PAINLEVEL_OUTOF10: 1

## 2024-02-24 ASSESSMENT — PAIN - FUNCTIONAL ASSESSMENT
PAIN_FUNCTIONAL_ASSESSMENT: 0-10
PAIN_FUNCTIONAL_ASSESSMENT: 0-10

## 2024-02-24 NOTE — ED PROVIDER NOTES
[facility]    Pt Name: Adamaris Irvin   MRN: [id number]   Birthdate 1990   Date of evaluation: 2/24/2024   Provider: Uri Elena MD   PCP: No primary care provider on file.   Note Started: 12:28 PM EST 2/24/24       CHIEF COMPLAINT  Cough and Chest Congestion       HISTORY OF PRESENT ILLNESS  Adamaris Irvin is a 33 y.o. female who  has a past medical history of Abnormal Pap smear, Abnormal Pap smear of cervix, Abnormal umbilical cord, ADHD (attention deficit hyperactivity disorder), Anxiety, Asthma, Bipolar 1 disorder (HCC), Chronic GERD, Diabetes mellitus (HCC), Endometriosis, Febrile convulsions (simple), unspecified, History of chicken pox, HPV in female, Irritable bowel, Obstructive sleep apnea, adult, Positive PPD, treated, Postpartum depression, Pre-eclampsia in third trimester, Prediabetes, PTSD (post-traumatic stress disorder), Thyroid disease, and Vertigo.   who presents to the ED complaining of cough and pleuritic pain.  Just recently diagnosed with influenza A.  She has a history of asthma.  Last night she had increased chest congestion and pleuritic pain bilaterally.  She has had fever with this.  No nausea or vomiting.  Denies any chance of pregnancy.    I have reviewed the following from the nursing documentation:        HISTORY :      Past Medical History:   Diagnosis Date    Abnormal Pap smear     Colposcopy; HPV.    Abnormal Pap smear of cervix     Colposcopy positive for HPV.    Abnormal umbilical cord     peripheral cord insertion.  monitor growth.    ADHD (attention deficit hyperactivity disorder)     no meds currently.    Anxiety     Currently taking Fluoxitine    Asthma     uses daily inhaler and rescue inhaler PRN.    Bipolar 1 disorder (HCC)     was taking prozac early in pregnancy.  switched to Quetiapine, Class C, consider weaning in 3rd trimester.    Chronic GERD     Diabetes mellitus (HCC)     GDM-insulin dependent    Endometriosis     Febrile   Head: Normocephalic and atraumatic.      Right Ear: External ear normal.      Left Ear: External ear normal.      Mouth/Throat:      Pharynx: Pharyngeal swelling and posterior oropharyngeal erythema present.   Eyes:      General: No scleral icterus.        Right eye: No discharge.         Left eye: No discharge.   Neck:      Thyroid: No thyromegaly.      Vascular: No JVD.      Trachea: No tracheal deviation.   Cardiovascular:      Rate and Rhythm: Normal rate and regular rhythm.      Heart sounds: No murmur heard.     No friction rub. No gallop.   Pulmonary:      Effort: Pulmonary effort is normal. No respiratory distress.      Breath sounds: Normal breath sounds. Decreased air movement present. No stridor. No wheezing or rales.   Abdominal:      Palpations: Abdomen is soft.   Musculoskeletal:         General: No tenderness.      Cervical back: Normal range of motion.      Right lower leg: No edema.      Left lower leg: No edema.   Skin:     General: Skin is warm and dry.      Findings: No rash (On exposed body surfaces).   Neurological:      Mental Status: She is alert and oriented to person, place, and time.      Coordination: Coordination normal.   Psychiatric:         Behavior: Behavior normal.         Thought Content: Thought content normal.         LABS  I have reviewed all labs for this visit.   No results found for this visit on 02/24/24.      RADIOLOGY  Non-plain film images such as CT, Ultrasound and MRI are read by the radiologist. Plain radiographic images are visualized and preliminarily interpreted by the ED Provider with the below findings:    2 view chest x-ray PA and lateral visualized and preliminarily interpreted by myself.  Lungs are clear.  Cardiac and mediastinal silhouette is normal.  Soft tissues appear normal.    Interpretation per the Radiologist below, if available at the time of this note:    XR CHEST (2 VW)    Result Date: 2/24/2024  EXAMINATION: TWO XRAY VIEWS OF THE CHEST 2/24/2024  COUGH

## 2024-02-24 NOTE — ED NOTES
Discharge and education instructions reviewed with patient. Teach-back successful.  Pt verbalized understanding. Pt denied questions at this time. No acute distress noted. Patient instructed to follow-up as noted - return to emergency department if symptoms worsen. Patient verbalized understanding. Discharged per EDMD with discharge instructions. Pt discharged to private vehicle. Patient stable upon departure. Thanked patient for choosing [facility] care.

## 2024-02-24 NOTE — ED TRIAGE NOTES
Patient came to ER was diagnosed with the flu last week and now feels like she has pneumonia.  Patient stated has pneumonia before.

## 2024-04-16 DIAGNOSIS — K21.9 GASTROESOPHAGEAL REFLUX DISEASE WITHOUT ESOPHAGITIS: ICD-10-CM

## 2024-04-17 RX ORDER — PANTOPRAZOLE SODIUM 40 MG/1
40 TABLET, DELAYED RELEASE ORAL 2 TIMES DAILY
Qty: 60 TABLET | Refills: 0 | Status: SHIPPED | OUTPATIENT
Start: 2024-04-17

## 2024-04-27 ENCOUNTER — HOSPITAL ENCOUNTER (EMERGENCY)
Age: 34
Discharge: HOME | End: 2024-04-27

## 2024-04-27 ENCOUNTER — APPOINTMENT (OUTPATIENT)
Dept: GENERAL RADIOLOGY | Age: 34
End: 2024-04-27

## 2024-04-27 VITALS
WEIGHT: 199 LBS | TEMPERATURE: 98.1 F | RESPIRATION RATE: 18 BRPM | SYSTOLIC BLOOD PRESSURE: 147 MMHG | HEART RATE: 93 BPM | DIASTOLIC BLOOD PRESSURE: 99 MMHG | BODY MASS INDEX: 32.12 KG/M2 | OXYGEN SATURATION: 98 %

## 2024-04-27 DIAGNOSIS — J45.901 EXACERBATION OF ASTHMA, UNSPECIFIED ASTHMA SEVERITY, UNSPECIFIED WHETHER PERSISTENT: ICD-10-CM

## 2024-04-27 DIAGNOSIS — J18.9 PNEUMONIA OF LEFT LOWER LOBE DUE TO INFECTIOUS ORGANISM: Primary | ICD-10-CM

## 2024-04-27 LAB
ALBUMIN SERPL-MCNC: 3.9 G/DL (ref 3.4–5)
ALBUMIN/GLOB SERPL: 1.3 {RATIO} (ref 1.1–2.2)
ALP SERPL-CCNC: 77 U/L (ref 40–129)
ALT SERPL-CCNC: 13 U/L (ref 10–40)
ANION GAP SERPL CALCULATED.3IONS-SCNC: 11 MMOL/L (ref 3–16)
AST SERPL-CCNC: 15 U/L (ref 15–37)
BASOPHILS # BLD: 0 K/UL (ref 0–0.2)
BASOPHILS NFR BLD: 0.5 %
BILIRUB SERPL-MCNC: <0.2 MG/DL (ref 0–1)
BUN SERPL-MCNC: 6 MG/DL (ref 7–20)
CALCIUM SERPL-MCNC: 8.5 MG/DL (ref 8.3–10.6)
CHLORIDE SERPL-SCNC: 103 MMOL/L (ref 99–110)
CO2 SERPL-SCNC: 26 MMOL/L (ref 21–32)
CREAT SERPL-MCNC: 0.5 MG/DL (ref 0.6–1.1)
DEPRECATED RDW RBC AUTO: 13.4 % (ref 12.4–15.4)
EOSINOPHIL # BLD: 0.1 K/UL (ref 0–0.6)
EOSINOPHIL NFR BLD: 1.4 %
FLUAV RNA RESP QL NAA+PROBE: NOT DETECTED
FLUBV RNA RESP QL NAA+PROBE: NOT DETECTED
GFR SERPLBLD CREATININE-BSD FMLA CKD-EPI: >90 ML/MIN/{1.73_M2}
GLUCOSE SERPL-MCNC: 99 MG/DL (ref 70–99)
HCT VFR BLD AUTO: 38.7 % (ref 36–48)
HGB BLD-MCNC: 13.1 G/DL (ref 12–16)
LYMPHOCYTES # BLD: 2.6 K/UL (ref 1–5.1)
LYMPHOCYTES NFR BLD: 33.7 %
MAGNESIUM SERPL-MCNC: 1.8 MG/DL (ref 1.8–2.4)
MCH RBC QN AUTO: 28.8 PG (ref 26–34)
MCHC RBC AUTO-ENTMCNC: 34 G/DL (ref 31–36)
MCV RBC AUTO: 84.9 FL (ref 80–100)
MONOCYTES # BLD: 0.5 K/UL (ref 0–1.3)
MONOCYTES NFR BLD: 7 %
NEUTROPHILS # BLD: 4.5 K/UL (ref 1.7–7.7)
NEUTROPHILS NFR BLD: 57.4 %
PLATELET # BLD AUTO: 283 K/UL (ref 135–450)
PMV BLD AUTO: 7.9 FL (ref 5–10.5)
POTASSIUM SERPL-SCNC: 3.4 MMOL/L (ref 3.5–5.1)
PROCALCITONIN SERPL IA-MCNC: 0.05 NG/ML (ref 0–0.15)
PROT SERPL-MCNC: 7 G/DL (ref 6.4–8.2)
RBC # BLD AUTO: 4.56 M/UL (ref 4–5.2)
SARS-COV-2 RNA RESP QL NAA+PROBE: NOT DETECTED
SODIUM SERPL-SCNC: 140 MMOL/L (ref 136–145)
WBC # BLD AUTO: 7.8 K/UL (ref 4–11)

## 2024-04-27 PROCEDURE — 2580000003 HC RX 258: Performed by: PHYSICIAN ASSISTANT

## 2024-04-27 PROCEDURE — 96374 THER/PROPH/DIAG INJ IV PUSH: CPT

## 2024-04-27 PROCEDURE — 94640 AIRWAY INHALATION TREATMENT: CPT

## 2024-04-27 PROCEDURE — 99285 EMERGENCY DEPT VISIT HI MDM: CPT

## 2024-04-27 PROCEDURE — 6360000002 HC RX W HCPCS: Performed by: PHYSICIAN ASSISTANT

## 2024-04-27 PROCEDURE — 80053 COMPREHEN METABOLIC PANEL: CPT

## 2024-04-27 PROCEDURE — 85025 COMPLETE CBC W/AUTO DIFF WBC: CPT

## 2024-04-27 PROCEDURE — 71046 X-RAY EXAM CHEST 2 VIEWS: CPT

## 2024-04-27 PROCEDURE — 6370000000 HC RX 637 (ALT 250 FOR IP): Performed by: PHYSICIAN ASSISTANT

## 2024-04-27 PROCEDURE — 84145 PROCALCITONIN (PCT): CPT

## 2024-04-27 PROCEDURE — 83735 ASSAY OF MAGNESIUM: CPT

## 2024-04-27 PROCEDURE — 87636 SARSCOV2 & INF A&B AMP PRB: CPT

## 2024-04-27 RX ORDER — DESVENLAFAXINE SUCCINATE 50 MG/1
50 TABLET, EXTENDED RELEASE ORAL DAILY
COMMUNITY

## 2024-04-27 RX ORDER — PREDNISONE 50 MG/1
50 TABLET ORAL DAILY
Qty: 5 TABLET | Refills: 0 | Status: SHIPPED | OUTPATIENT
Start: 2024-04-27 | End: 2024-05-02

## 2024-04-27 RX ORDER — ALBUTEROL SULFATE 2.5 MG/3ML
2.5 SOLUTION RESPIRATORY (INHALATION) EVERY 6 HOURS PRN
Qty: 30 EACH | Refills: 1 | Status: SHIPPED | OUTPATIENT
Start: 2024-04-27

## 2024-04-27 RX ORDER — DOXYCYCLINE HYCLATE 100 MG
100 TABLET ORAL 2 TIMES DAILY
Qty: 14 TABLET | Refills: 0 | Status: SHIPPED | OUTPATIENT
Start: 2024-04-27 | End: 2024-05-04

## 2024-04-27 RX ORDER — AMOXICILLIN 250 MG/1
1000 CAPSULE ORAL ONCE
Status: COMPLETED | OUTPATIENT
Start: 2024-04-27 | End: 2024-04-27

## 2024-04-27 RX ORDER — DOXYCYCLINE HYCLATE 100 MG
100 TABLET ORAL ONCE
Status: COMPLETED | OUTPATIENT
Start: 2024-04-27 | End: 2024-04-27

## 2024-04-27 RX ORDER — AMOXICILLIN 500 MG/1
1000 CAPSULE ORAL 3 TIMES DAILY
Qty: 42 CAPSULE | Refills: 0 | Status: SHIPPED | OUTPATIENT
Start: 2024-04-27 | End: 2024-05-04

## 2024-04-27 RX ORDER — IPRATROPIUM BROMIDE AND ALBUTEROL SULFATE 2.5; .5 MG/3ML; MG/3ML
1 SOLUTION RESPIRATORY (INHALATION) ONCE
Status: COMPLETED | OUTPATIENT
Start: 2024-04-27 | End: 2024-04-27

## 2024-04-27 RX ORDER — LAMOTRIGINE 200 MG/1
200 TABLET ORAL NIGHTLY
COMMUNITY

## 2024-04-27 RX ADMIN — DOXYCYCLINE HYCLATE 100 MG: 100 TABLET, COATED ORAL at 17:27

## 2024-04-27 RX ADMIN — WATER 125 MG: 1 INJECTION INTRAMUSCULAR; INTRAVENOUS; SUBCUTANEOUS at 15:32

## 2024-04-27 RX ADMIN — AMOXICILLIN 1000 MG: 250 CAPSULE ORAL at 17:27

## 2024-04-27 RX ADMIN — IPRATROPIUM BROMIDE AND ALBUTEROL SULFATE 1 DOSE: .5; 3 SOLUTION RESPIRATORY (INHALATION) at 15:41

## 2024-04-27 ASSESSMENT — ENCOUNTER SYMPTOMS
STRIDOR: 0
EYE DISCHARGE: 0
SHORTNESS OF BREATH: 1
BACK PAIN: 0
ABDOMINAL PAIN: 0
EYE ITCHING: 0
COUGH: 1
NAUSEA: 0
DIARRHEA: 0
EYE REDNESS: 0
RHINORRHEA: 0
SORE THROAT: 0
VOMITING: 0

## 2024-04-27 NOTE — PROGRESS NOTES
Pharmacy Home Medication Reconciliation Note    A medication reconciliation has been completed for Adamaris Irvin 1990    Pharmacy: [facility] [id number] [place], [place]    Information provided by: Patient    The patient's home medication list is as follows:  No current facility-administered medications on file prior to encounter.     Current Outpatient Medications on File Prior to Encounter   Medication Sig Dispense Refill    lamoTRIgine (LAMICTAL) 200 MG tablet Take 1 tablet by mouth nightly      desvenlafaxine succinate (PRISTIQ) 50 MG TB24 extended release tablet Take 1 tablet by mouth daily      pantoprazole (PROTONIX) 40 MG tablet Take 1 tablet by mouth 2 times daily 60 tablet 0    gabapentin (NEURONTIN) 400 MG capsule Take 1 capsule by mouth nightly for 180 days. 30 capsule 5    melatonin 3 MG TABS tablet Take 1 tablet by mouth daily 90 tablet 1    Fluticasone-Salmeterol 113-14 MCG/ACT AEPB One inhalation twice daily (Patient not taking: Reported on 2/24/2024) 1 each 2    loratadine (CLARITIN) 10 MG tablet Take 1 tablet by mouth daily (Patient not taking: Reported on 4/27/2024) 30 tablet 5    oxybutynin (DITROPAN-XL) 10 MG extended release tablet TAKE 1 TABLET BY MOUTH TWICE A  tablet 1    naproxen (NAPROSYN) 500 MG tablet Take 1 tablet by mouth 2 times daily (with meals) (Patient not taking: Reported on 4/27/2024) 60 tablet 0    EPINEPHrine (EPIPEN 2-KATIANA) 0.3 MG/0.3ML SOAJ injection Inject 0.3 mLs into the muscle once for 1 dose Use as directed for allergic reaction 0.3 mL 0    Cholecalciferol (VITAMIN D3) 250 MCG (35177 UT) CAPS Take 1 capsule by mouth daily (Patient not taking: Reported on 4/27/2024) 100 capsule 3    albuterol sulfate HFA (VENTOLIN HFA) 108 (90 Base) MCG/ACT inhaler Inhale 2 puffs into the lungs daily as needed for Wheezing or Shortness of Breath 1 each 2    ondansetron (ZOFRAN) 4 MG tablet Take 1 tablet by mouth every 8 hours as needed for Nausea (Patient not

## 2024-04-27 NOTE — ED PROVIDER NOTES
[facility]  [address] [facility] [address]  [phone]        [facility] Emergency Department  [address] [facility] [address]  [phone]    As needed, If symptoms worsen      DISCHARGE MEDICATIONS:  New Prescriptions    ALBUTEROL (PROVENTIL) (2.5 MG/3ML) 0.083% NEBULIZER SOLUTION    Take 3 mLs by nebulization every 6 hours as needed for Wheezing    AMOXICILLIN (AMOXIL) 500 MG CAPSULE    Take 2 capsules by mouth 3 times daily for 7 days    DOXYCYCLINE HYCLATE (VIBRA-TABS) 100 MG TABLET    Take 1 tablet by mouth 2 times daily for 7 days    PREDNISONE (DELTASONE) 50 MG TABLET    Take 1 tablet by mouth daily for 5 days       DISCONTINUED MEDICATIONS:  Discontinued Medications    ALBUTEROL (PROVENTIL) (2.5 MG/3ML) 0.083% NEBULIZER SOLUTION    Take 3 mLs by nebulization every 6 hours as needed for Wheezing              (Please note that portions of this note were completed with a voice recognition program.  Efforts were made to edit the dictations but occasionally words are mis-transcribed.)    NILSA Moran (electronically signed)           Merissa Horton PA  04/27/24 [id number]

## 2024-04-28 LAB
EKG ATRIAL RATE: 85 BPM
EKG DIAGNOSIS: NORMAL
EKG P AXIS: 49 DEGREES
EKG P-R INTERVAL: 128 MS
EKG Q-T INTERVAL: 374 MS
EKG QRS DURATION: 84 MS
EKG QTC CALCULATION (BAZETT): 445 MS
EKG R AXIS: 10 DEGREES
EKG T AXIS: 5 DEGREES
EKG VENTRICULAR RATE: 85 BPM

## 2024-05-09 DIAGNOSIS — E55.9 VITAMIN D DEFICIENCY: ICD-10-CM

## 2024-05-09 DIAGNOSIS — E03.9 ACQUIRED HYPOTHYROIDISM: ICD-10-CM

## 2024-05-09 DIAGNOSIS — N39.41 URGENCY INCONTINENCE: ICD-10-CM

## 2024-05-09 DIAGNOSIS — G25.81 RESTLESS LEGS SYNDROME (RLS): ICD-10-CM

## 2024-05-09 DIAGNOSIS — J30.2 SEASONAL ALLERGIC RHINITIS, UNSPECIFIED TRIGGER: ICD-10-CM

## 2024-05-09 RX ORDER — GABAPENTIN 400 MG/1
400 CAPSULE ORAL NIGHTLY
Qty: 30 CAPSULE | Refills: 5 | Status: SHIPPED | OUTPATIENT
Start: 2024-05-09 | End: 2024-11-05

## 2024-05-09 RX ORDER — LEVOTHYROXINE SODIUM 0.03 MG/1
25 TABLET ORAL DAILY
Qty: 60 TABLET | Refills: 2 | Status: SHIPPED | OUTPATIENT
Start: 2024-05-09

## 2024-05-09 RX ORDER — LORATADINE 10 MG/1
10 TABLET ORAL DAILY
Qty: 30 TABLET | Refills: 5 | Status: SHIPPED | OUTPATIENT
Start: 2024-05-09

## 2024-05-09 RX ORDER — OXYBUTYNIN CHLORIDE 10 MG/1
10 TABLET, EXTENDED RELEASE ORAL 2 TIMES DAILY
Qty: 180 TABLET | Refills: 1 | Status: SHIPPED | OUTPATIENT
Start: 2024-05-09

## 2024-06-14 DIAGNOSIS — J45.40 MODERATE PERSISTENT ASTHMA WITHOUT COMPLICATION: ICD-10-CM

## 2024-06-14 RX ORDER — FLUTICASONE PROPIONATE AND SALMETEROL 113; 14 UG/1; UG/1
POWDER, METERED RESPIRATORY (INHALATION)
Qty: 1 EACH | Refills: 2 | Status: SHIPPED | OUTPATIENT
Start: 2024-06-14

## 2024-08-20 ENCOUNTER — TELEMEDICINE (OUTPATIENT)
Dept: FAMILY MEDICINE CLINIC | Age: 34
End: 2024-08-20

## 2024-08-20 DIAGNOSIS — R19.5 CHANGE IN STOOL: Primary | ICD-10-CM

## 2024-08-20 PROCEDURE — 99213 OFFICE O/P EST LOW 20 MIN: CPT | Performed by: FAMILY MEDICINE

## 2024-08-20 ASSESSMENT — PATIENT HEALTH QUESTIONNAIRE - PHQ9
10. IF YOU CHECKED OFF ANY PROBLEMS, HOW DIFFICULT HAVE THESE PROBLEMS MADE IT FOR YOU TO DO YOUR WORK, TAKE CARE OF THINGS AT HOME, OR GET ALONG WITH OTHER PEOPLE: NOT DIFFICULT AT ALL
8. MOVING OR SPEAKING SO SLOWLY THAT OTHER PEOPLE COULD HAVE NOTICED. OR THE OPPOSITE, BEING SO FIGETY OR RESTLESS THAT YOU HAVE BEEN MOVING AROUND A LOT MORE THAN USUAL: NOT AT ALL
5. POOR APPETITE OR OVEREATING: NOT AT ALL
SUM OF ALL RESPONSES TO PHQ9 QUESTIONS 1 & 2: 0
1. LITTLE INTEREST OR PLEASURE IN DOING THINGS: NOT AT ALL
9. THOUGHTS THAT YOU WOULD BE BETTER OFF DEAD, OR OF HURTING YOURSELF: NOT AT ALL
4. FEELING TIRED OR HAVING LITTLE ENERGY: NOT AT ALL
3. TROUBLE FALLING OR STAYING ASLEEP: NOT AT ALL
SUM OF ALL RESPONSES TO PHQ QUESTIONS 1-9: 3
SUM OF ALL RESPONSES TO PHQ QUESTIONS 1-9: 3
6. FEELING BAD ABOUT YOURSELF - OR THAT YOU ARE A FAILURE OR HAVE LET YOURSELF OR YOUR FAMILY DOWN: NOT AT ALL
2. FEELING DOWN, DEPRESSED OR HOPELESS: NOT AT ALL
SUM OF ALL RESPONSES TO PHQ QUESTIONS 1-9: 3
7. TROUBLE CONCENTRATING ON THINGS, SUCH AS READING THE NEWSPAPER OR WATCHING TELEVISION: NEARLY EVERY DAY
SUM OF ALL RESPONSES TO PHQ QUESTIONS 1-9: 3

## 2024-08-20 NOTE — PROGRESS NOTES
Family History   Problem Relation Age of Onset    Diabetes Mother     Alcohol Abuse Mother     Depression Mother     Arthritis Mother     Mental Illness Father         bipolar    Heart Disease Father 42        MI x 3    Hypertension Father     Elevated Lipids Father     Coronary Art Dis Father     Depression Father     COPD Father     Arthritis Father     Cancer Sister         cervical    Depression Sister     Heart Failure Maternal Grandmother     Asthma Maternal Grandmother      Social History     Tobacco Use    Smoking status: Never    Smokeless tobacco: Never    Tobacco comments:     congratulated on nonsmoking status.   Vaping Use    Vaping status: Never Used   Substance Use Topics    Alcohol use: No     Alcohol/week: 0.0 standard drinks of alcohol    Drug use: No      LAST LABS  Cholesterol, Total   Date Value Ref Range Status   05/26/2021 186 0 - 199 mg/dL Final     LDL Calculated   Date Value Ref Range Status   05/26/2021 113 (H) <100 mg/dL Final     HDL   Date Value Ref Range Status   05/26/2021 39 (L) 40 - 60 mg/dL Final     Triglycerides   Date Value Ref Range Status   05/26/2021 170 (H) 0 - 150 mg/dL Final     Lab Results   Component Value Date    GLUCOSE 99 04/27/2024     Lab Results   Component Value Date     04/27/2024    K 3.4 (L) 04/27/2024    CREATININE 0.5 (L) 04/27/2024     Lab Results   Component Value Date    WBC 7.8 04/27/2024    HGB 13.1 04/27/2024    HCT 38.7 04/27/2024    MCV 84.9 04/27/2024     04/27/2024     Lab Results   Component Value Date    ALT 13 04/27/2024    AST 15 04/27/2024    ALKPHOS 77 04/27/2024    BILITOT <0.2 04/27/2024     TSH (uIU/mL)   Date Value   04/19/2022 1.28   04/18/2019 3.77     Lab Results   Component Value Date    LABA1C 5.6 09/28/2023      Objective:   PHYSICAL EXAM:  Vitals (if available)    BP Readings from Last 3 Encounters:   04/27/24 (!) 147/99   02/24/24 (!) 123/90   11/13/23 (!) 170/84     Wt Readings from Last 3 Encounters:   04/27/24

## 2024-09-24 ENCOUNTER — TELEPHONE (OUTPATIENT)
Dept: FAMILY MEDICINE CLINIC | Age: 34
End: 2024-09-24

## 2024-09-24 ENCOUNTER — OFFICE VISIT (OUTPATIENT)
Dept: FAMILY MEDICINE CLINIC | Age: 34
End: 2024-09-24

## 2024-09-24 VITALS
HEIGHT: 66 IN | SYSTOLIC BLOOD PRESSURE: 136 MMHG | OXYGEN SATURATION: 98 % | HEART RATE: 88 BPM | TEMPERATURE: 98.3 F | BODY MASS INDEX: 31.66 KG/M2 | WEIGHT: 197 LBS | DIASTOLIC BLOOD PRESSURE: 84 MMHG

## 2024-09-24 DIAGNOSIS — B96.89 ACUTE BACTERIAL SINUSITIS: Primary | ICD-10-CM

## 2024-09-24 DIAGNOSIS — B37.9 ANTIBIOTIC-INDUCED YEAST INFECTION: ICD-10-CM

## 2024-09-24 DIAGNOSIS — T36.95XA ANTIBIOTIC-INDUCED YEAST INFECTION: ICD-10-CM

## 2024-09-24 DIAGNOSIS — J01.90 ACUTE BACTERIAL SINUSITIS: Primary | ICD-10-CM

## 2024-09-24 PROCEDURE — 99213 OFFICE O/P EST LOW 20 MIN: CPT

## 2024-09-24 RX ORDER — FLUCONAZOLE 150 MG/1
150 TABLET ORAL ONCE
Qty: 2 TABLET | Refills: 0 | Status: SHIPPED | OUTPATIENT
Start: 2024-09-24 | End: 2024-09-24

## 2024-09-24 SDOH — ECONOMIC STABILITY: FOOD INSECURITY: WITHIN THE PAST 12 MONTHS, THE FOOD YOU BOUGHT JUST DIDN'T LAST AND YOU DIDN'T HAVE MONEY TO GET MORE.: NEVER TRUE

## 2024-09-24 SDOH — ECONOMIC STABILITY: INCOME INSECURITY: HOW HARD IS IT FOR YOU TO PAY FOR THE VERY BASICS LIKE FOOD, HOUSING, MEDICAL CARE, AND HEATING?: NOT HARD AT ALL

## 2024-09-24 SDOH — ECONOMIC STABILITY: FOOD INSECURITY: WITHIN THE PAST 12 MONTHS, YOU WORRIED THAT YOUR FOOD WOULD RUN OUT BEFORE YOU GOT MONEY TO BUY MORE.: NEVER TRUE

## 2024-09-24 ASSESSMENT — ENCOUNTER SYMPTOMS
SINUS PAIN: 1
ABDOMINAL PAIN: 0
RHINORRHEA: 1
SHORTNESS OF BREATH: 0
SORE THROAT: 0
WHEEZING: 0
FACIAL SWELLING: 0
COUGH: 0

## 2024-09-25 DIAGNOSIS — B96.89 ACUTE BACTERIAL SINUSITIS: ICD-10-CM

## 2024-09-25 DIAGNOSIS — J01.90 ACUTE BACTERIAL SINUSITIS: ICD-10-CM

## 2024-11-27 DIAGNOSIS — N39.41 URGENCY INCONTINENCE: ICD-10-CM

## 2024-11-27 RX ORDER — OXYBUTYNIN CHLORIDE 10 MG/1
10 TABLET, EXTENDED RELEASE ORAL 2 TIMES DAILY
Qty: 180 TABLET | Refills: 0 | Status: SHIPPED | OUTPATIENT
Start: 2024-11-27

## 2025-01-10 ENCOUNTER — PATIENT MESSAGE (OUTPATIENT)
Dept: FAMILY MEDICINE CLINIC | Age: 35
End: 2025-01-10

## 2025-01-10 RX ORDER — HYDROXYZINE HYDROCHLORIDE 50 MG/1
50 TABLET, FILM COATED ORAL 2 TIMES DAILY
Qty: 60 TABLET | Refills: 0 | Status: SHIPPED | OUTPATIENT
Start: 2025-01-10

## 2025-02-08 DIAGNOSIS — G25.81 RESTLESS LEGS SYNDROME (RLS): ICD-10-CM

## 2025-02-10 RX ORDER — GABAPENTIN 400 MG/1
CAPSULE ORAL
Qty: 30 CAPSULE | Refills: 2 | Status: SHIPPED | OUTPATIENT
Start: 2025-02-10 | End: 2025-05-11

## 2025-03-03 ENCOUNTER — PATIENT MESSAGE (OUTPATIENT)
Dept: FAMILY MEDICINE CLINIC | Age: 35
End: 2025-03-03

## 2025-03-03 ENCOUNTER — TELEMEDICINE (OUTPATIENT)
Dept: FAMILY MEDICINE CLINIC | Age: 35
End: 2025-03-03

## 2025-03-03 DIAGNOSIS — J30.9 ALLERGIC RHINITIS, UNSPECIFIED SEASONALITY, UNSPECIFIED TRIGGER: ICD-10-CM

## 2025-03-03 DIAGNOSIS — B96.89 ACUTE BACTERIAL SINUSITIS: Primary | ICD-10-CM

## 2025-03-03 DIAGNOSIS — J01.90 ACUTE BACTERIAL SINUSITIS: Primary | ICD-10-CM

## 2025-03-03 PROCEDURE — G2211 COMPLEX E/M VISIT ADD ON: HCPCS | Performed by: FAMILY MEDICINE

## 2025-03-03 PROCEDURE — 99213 OFFICE O/P EST LOW 20 MIN: CPT | Performed by: FAMILY MEDICINE

## 2025-03-03 SDOH — ECONOMIC STABILITY: FOOD INSECURITY: WITHIN THE PAST 12 MONTHS, YOU WORRIED THAT YOUR FOOD WOULD RUN OUT BEFORE YOU GOT MONEY TO BUY MORE.: NEVER TRUE

## 2025-03-03 SDOH — ECONOMIC STABILITY: FOOD INSECURITY: WITHIN THE PAST 12 MONTHS, THE FOOD YOU BOUGHT JUST DIDN'T LAST AND YOU DIDN'T HAVE MONEY TO GET MORE.: NEVER TRUE

## 2025-03-03 ASSESSMENT — PATIENT HEALTH QUESTIONNAIRE - PHQ9
1. LITTLE INTEREST OR PLEASURE IN DOING THINGS: NOT AT ALL
5. POOR APPETITE OR OVEREATING: NOT AT ALL
3. TROUBLE FALLING OR STAYING ASLEEP: NOT AT ALL
2. FEELING DOWN, DEPRESSED OR HOPELESS: NOT AT ALL
6. FEELING BAD ABOUT YOURSELF - OR THAT YOU ARE A FAILURE OR HAVE LET YOURSELF OR YOUR FAMILY DOWN: NOT AT ALL
SUM OF ALL RESPONSES TO PHQ QUESTIONS 1-9: 1
8. MOVING OR SPEAKING SO SLOWLY THAT OTHER PEOPLE COULD HAVE NOTICED. OR THE OPPOSITE, BEING SO FIGETY OR RESTLESS THAT YOU HAVE BEEN MOVING AROUND A LOT MORE THAN USUAL: NOT AT ALL
4. FEELING TIRED OR HAVING LITTLE ENERGY: NOT AT ALL
SUM OF ALL RESPONSES TO PHQ QUESTIONS 1-9: 1
7. TROUBLE CONCENTRATING ON THINGS, SUCH AS READING THE NEWSPAPER OR WATCHING TELEVISION: SEVERAL DAYS
SUM OF ALL RESPONSES TO PHQ QUESTIONS 1-9: 1
SUM OF ALL RESPONSES TO PHQ QUESTIONS 1-9: 1
9. THOUGHTS THAT YOU WOULD BE BETTER OFF DEAD, OR OF HURTING YOURSELF: NOT AT ALL
10. IF YOU CHECKED OFF ANY PROBLEMS, HOW DIFFICULT HAVE THESE PROBLEMS MADE IT FOR YOU TO DO YOUR WORK, TAKE CARE OF THINGS AT HOME, OR GET ALONG WITH OTHER PEOPLE: SOMEWHAT DIFFICULT

## 2025-03-03 NOTE — PROGRESS NOTES
tablet Take 1 tablet by mouth nightly as needed (muscle pain)  Patient not taking: Reported on 9/24/2024  Chava Chua MD      Family History   Problem Relation Age of Onset    Diabetes Mother     Alcohol Abuse Mother     Depression Mother     Arthritis Mother     Mental Illness Father         bipolar    Heart Disease Father 42        MI x 3    Hypertension Father     Elevated Lipids Father     Coronary Art Dis Father     Depression Father     COPD Father     Arthritis Father     Cancer Sister         cervical    Depression Sister     Heart Failure Maternal Grandmother     Asthma Maternal Grandmother      Social History     Tobacco Use    Smoking status: Never    Smokeless tobacco: Never    Tobacco comments:     congratulated on nonsmoking status.   Vaping Use    Vaping status: Never Used   Substance Use Topics    Alcohol use: No     Alcohol/week: 0.0 standard drinks of alcohol    Drug use: No      LAST LABS  Cholesterol, Total   Date Value Ref Range Status   05/26/2021 186 0 - 199 mg/dL Final     LDL Calculated   Date Value Ref Range Status   05/26/2021 113 (H) <100 mg/dL Final     HDL   Date Value Ref Range Status   05/26/2021 39 (L) 40 - 60 mg/dL Final     Triglycerides   Date Value Ref Range Status   05/26/2021 170 (H) 0 - 150 mg/dL Final     Lab Results   Component Value Date    GLUCOSE 99 04/27/2024     Lab Results   Component Value Date     04/27/2024    K 3.4 (L) 04/27/2024    CREATININE 0.5 (L) 04/27/2024     Lab Results   Component Value Date    WBC 7.8 04/27/2024    HGB 13.1 04/27/2024    HCT 38.7 04/27/2024    MCV 84.9 04/27/2024     04/27/2024     Lab Results   Component Value Date    ALT 13 04/27/2024    AST 15 04/27/2024    ALKPHOS 77 04/27/2024    BILITOT <0.2 04/27/2024     TSH (uIU/mL)   Date Value   04/19/2022 1.28   04/18/2019 3.77     Lab Results   Component Value Date    LABA1C 5.6 09/28/2023      Objective:   PHYSICAL EXAM:  Vitals (if available)    BP Readings from Last

## 2025-03-05 ENCOUNTER — PATIENT MESSAGE (OUTPATIENT)
Dept: FAMILY MEDICINE CLINIC | Age: 35
End: 2025-03-05

## 2025-03-06 RX ORDER — DESVENLAFAXINE 50 MG/1
50 TABLET, FILM COATED, EXTENDED RELEASE ORAL DAILY
Qty: 30 TABLET | Refills: 0 | Status: SHIPPED | OUTPATIENT
Start: 2025-03-06

## 2025-03-06 RX ORDER — HYDROXYZINE HYDROCHLORIDE 50 MG/1
50 TABLET, FILM COATED ORAL 2 TIMES DAILY
Qty: 60 TABLET | Refills: 0 | Status: SHIPPED | OUTPATIENT
Start: 2025-03-06

## 2025-03-06 RX ORDER — LAMOTRIGINE 200 MG/1
200 TABLET ORAL NIGHTLY
Qty: 30 TABLET | Refills: 0 | Status: SHIPPED | OUTPATIENT
Start: 2025-03-06

## 2025-03-14 ENCOUNTER — OFFICE VISIT (OUTPATIENT)
Dept: FAMILY MEDICINE CLINIC | Age: 35
End: 2025-03-14

## 2025-03-14 VITALS
HEART RATE: 91 BPM | WEIGHT: 192.4 LBS | HEIGHT: 66 IN | BODY MASS INDEX: 30.92 KG/M2 | RESPIRATION RATE: 18 BRPM | TEMPERATURE: 98.1 F | OXYGEN SATURATION: 97 % | SYSTOLIC BLOOD PRESSURE: 116 MMHG | DIASTOLIC BLOOD PRESSURE: 82 MMHG

## 2025-03-14 DIAGNOSIS — F31.30 BIPOLAR I DISORDER, MOST RECENT EPISODE (OR CURRENT) DEPRESSED (HCC): ICD-10-CM

## 2025-03-14 DIAGNOSIS — E55.9 VITAMIN D DEFICIENCY: ICD-10-CM

## 2025-03-14 DIAGNOSIS — E03.9 ACQUIRED HYPOTHYROIDISM: ICD-10-CM

## 2025-03-14 DIAGNOSIS — F90.2 ATTENTION DEFICIT HYPERACTIVITY DISORDER (ADHD), COMBINED TYPE: ICD-10-CM

## 2025-03-14 DIAGNOSIS — K21.9 GASTROESOPHAGEAL REFLUX DISEASE WITHOUT ESOPHAGITIS: ICD-10-CM

## 2025-03-14 DIAGNOSIS — Z12.4 SCREENING FOR CERVICAL CANCER: ICD-10-CM

## 2025-03-14 DIAGNOSIS — F41.9 ANXIETY: ICD-10-CM

## 2025-03-14 DIAGNOSIS — Z01.419 ENCOUNTER FOR WELL WOMAN EXAM WITH ROUTINE GYNECOLOGICAL EXAM: ICD-10-CM

## 2025-03-14 DIAGNOSIS — Z01.419 ENCOUNTER FOR WELL WOMAN EXAM WITH ROUTINE GYNECOLOGICAL EXAM: Primary | ICD-10-CM

## 2025-03-14 DIAGNOSIS — R73.03 PREDIABETES: ICD-10-CM

## 2025-03-14 DIAGNOSIS — J45.40 MODERATE PERSISTENT ASTHMA WITHOUT COMPLICATION: ICD-10-CM

## 2025-03-14 LAB
25(OH)D3 SERPL-MCNC: 24 NG/ML
ALBUMIN SERPL-MCNC: 4.6 G/DL (ref 3.4–5)
ALBUMIN/GLOB SERPL: 2 {RATIO} (ref 1.1–2.2)
ALP SERPL-CCNC: 85 U/L (ref 40–129)
ALT SERPL-CCNC: 14 U/L (ref 10–40)
ANION GAP SERPL CALCULATED.3IONS-SCNC: 11 MMOL/L (ref 3–16)
AST SERPL-CCNC: 16 U/L (ref 15–37)
BILIRUB SERPL-MCNC: 0.3 MG/DL (ref 0–1)
BUN SERPL-MCNC: 8 MG/DL (ref 7–20)
CALCIUM SERPL-MCNC: 9.4 MG/DL (ref 8.3–10.6)
CHLORIDE SERPL-SCNC: 101 MMOL/L (ref 99–110)
CHOLEST SERPL-MCNC: 193 MG/DL (ref 0–199)
CO2 SERPL-SCNC: 26 MMOL/L (ref 21–32)
CREAT SERPL-MCNC: 0.6 MG/DL (ref 0.6–1.1)
EST. AVERAGE GLUCOSE BLD GHB EST-MCNC: 111.2 MG/DL
GFR SERPLBLD CREATININE-BSD FMLA CKD-EPI: >90 ML/MIN/{1.73_M2}
GLUCOSE SERPL-MCNC: 95 MG/DL (ref 70–99)
HBA1C MFR BLD: 5.5 %
HDLC SERPL-MCNC: 49 MG/DL (ref 40–60)
LDLC SERPL CALC-MCNC: 126 MG/DL
POTASSIUM SERPL-SCNC: 4.2 MMOL/L (ref 3.5–5.1)
PROT SERPL-MCNC: 6.9 G/DL (ref 6.4–8.2)
SODIUM SERPL-SCNC: 138 MMOL/L (ref 136–145)
TRIGL SERPL-MCNC: 91 MG/DL (ref 0–150)
TSH SERPL DL<=0.005 MIU/L-ACNC: 1.25 UIU/ML (ref 0.27–4.2)
VLDLC SERPL CALC-MCNC: 18 MG/DL

## 2025-03-14 RX ORDER — PANTOPRAZOLE SODIUM 40 MG/1
40 TABLET, DELAYED RELEASE ORAL 2 TIMES DAILY
Qty: 60 TABLET | Refills: 0 | Status: SHIPPED | OUTPATIENT
Start: 2025-03-14

## 2025-03-14 RX ORDER — ALBUTEROL SULFATE 90 UG/1
2 INHALANT RESPIRATORY (INHALATION) DAILY PRN
Qty: 1 EACH | Refills: 2 | Status: SHIPPED | OUTPATIENT
Start: 2025-03-14

## 2025-03-14 RX ORDER — GUANFACINE 1 MG/1
TABLET, EXTENDED RELEASE ORAL
Qty: 93 TABLET | Refills: 0 | Status: SHIPPED | OUTPATIENT
Start: 2025-03-14 | End: 2025-04-13

## 2025-03-14 RX ORDER — FLUTICASONE PROPIONATE AND SALMETEROL XINAFOATE 115; 21 UG/1; UG/1
2 AEROSOL, METERED RESPIRATORY (INHALATION) 2 TIMES DAILY
Qty: 1 EACH | Refills: 5 | Status: SHIPPED | OUTPATIENT
Start: 2025-03-14

## 2025-03-14 RX ORDER — GUANFACINE 4 MG/1
4 TABLET, EXTENDED RELEASE ORAL DAILY
Qty: 30 TABLET | Refills: 2 | Status: SHIPPED | OUTPATIENT
Start: 2025-03-14

## 2025-03-14 NOTE — PATIENT INSTRUCTIONS
INSTRUCTIONS  NEXT APPOINTMENT: Please schedule check-up in 3 months     PLEASE TAKE THIS FORM TO CHECK-OUT WINDOW TO SCHEDULE NEXT VISIT.   PLEASE GET BLOODWORK DRAWN TODAY ON FIRST FLOOR in 170.  Take orders with you.  RESULTS- most blood tests back in couple days.  We will call you if any problems.  If bloodwork good, you will get letter in mail or notified thru MyChart (if signed up) within 2 weeks.  If you do not, please call office.   Start GUANFACINE tapering u to 4 mg.  STOP stratterra.

## 2025-03-14 NOTE — PROGRESS NOTES
GYN EXAM  Subjective:     Chief Complaint   Patient presents with    Gynecologic Exam     Routine exam.  No issues.      Adamaris Irvin is a 34 y.o. female who presents for annual testing/preventive review and check-up of medical problems listed below:  1. Encounter for well woman exam with routine gynecological exam    2. Acquired hypothyroidism    3. Moderate persistent asthma without complication    4. Anxiety    5. Vitamin D deficiency    6. Prediabetes    7. Screening for cervical cancer    8. Bipolar I disorder, most recent episode (or current) depressed (HCC)    9. Gastroesophageal reflux disease without esophagitis    10. Attention deficit hyperactivity disorder (ADHD), combined type       Gynecologic History  Patient's last menstrual period was 2025 (approximate).  OB History          2    Para   2    Term   1       0    AB   0    Living   1         SAB   0    IAB   0    Ectopic   0    Molar        Multiple   0    Live Births   1              Pap ever had abnormal? Yes, HPV pos once  Irregular or heavy menses? Yes  Abnormal vaginal discharge? No  Hx STD or pelvic infections? No  Urinary problems or leakage? No  Breast lumps or discharge? No  Contraception: tubal ligation  Any history of abuse? No    CHART REVIEW  Health Maintenance   Topic Date Due    COVID-19 Vaccine (2024-25 season) Never done    A1C test (Diabetic or Prediabetic)  2024    Cervical cancer screen  2025    Depression Monitoring  2026    DTaP/Tdap/Td vaccine (9 - Td or Tdap) 2028    Hepatitis B vaccine  Completed    Hib vaccine  Completed    HPV vaccine  Completed    Polio vaccine  Completed    Flu vaccine  Completed    Pneumococcal 0-49 years Vaccine  Completed    Hepatitis C screen  Completed    HIV screen  Completed    Hepatitis A vaccine  Aged Out    Meningococcal (ACWY) vaccine  Aged Out    Meningococcal B vaccine  Aged Out    Varicella vaccine  Discontinued     The ASCVD Risk score

## 2025-03-15 LAB
BV BACTERIA DNA VAG QL NAA+PROBE: NOT DETECTED
C GLABRATA DNA VAG QL NAA+PROBE: NORMAL
C GLABRATA DNA VAG QL NAA+PROBE: NOT DETECTED
C KRUSEI DNA VAG QL NAA+PROBE: NOT DETECTED
CANDIDA DNA VAG QL NAA+PROBE: NOT DETECTED
HPV16+18+H RISK 12 DNA SPEC-IMP: NORMAL
T VAGINALIS DNA VAG QL NAA+PROBE: NOT DETECTED

## 2025-03-17 ENCOUNTER — RESULTS FOLLOW-UP (OUTPATIENT)
Dept: FAMILY MEDICINE CLINIC | Age: 35
End: 2025-03-17

## 2025-03-17 DIAGNOSIS — E55.9 VITAMIN D DEFICIENCY: ICD-10-CM

## 2025-03-18 LAB
HPV HR 12 DNA SPEC QL NAA+PROBE: NOT DETECTED
HPV16 DNA SPEC QL NAA+PROBE: NOT DETECTED
HPV16+18+H RISK 12 DNA SPEC-IMP: NORMAL
HPV18 DNA SPEC QL NAA+PROBE: NOT DETECTED

## 2025-04-20 DIAGNOSIS — K21.9 GASTROESOPHAGEAL REFLUX DISEASE WITHOUT ESOPHAGITIS: ICD-10-CM

## 2025-04-20 DIAGNOSIS — J30.2 SEASONAL ALLERGIC RHINITIS, UNSPECIFIED TRIGGER: ICD-10-CM

## 2025-04-20 DIAGNOSIS — E03.9 ACQUIRED HYPOTHYROIDISM: ICD-10-CM

## 2025-04-21 DIAGNOSIS — K58.0 IRRITABLE BOWEL SYNDROME WITH DIARRHEA: ICD-10-CM

## 2025-04-21 DIAGNOSIS — G47.00 INSOMNIA, UNSPECIFIED TYPE: ICD-10-CM

## 2025-04-21 DIAGNOSIS — K21.9 GASTROESOPHAGEAL REFLUX DISEASE WITHOUT ESOPHAGITIS: ICD-10-CM

## 2025-04-21 RX ORDER — PANTOPRAZOLE SODIUM 40 MG/1
40 TABLET, DELAYED RELEASE ORAL 2 TIMES DAILY
Qty: 180 TABLET | Refills: 1 | Status: SHIPPED | OUTPATIENT
Start: 2025-04-21

## 2025-04-21 RX ORDER — PANTOPRAZOLE SODIUM 40 MG/1
40 TABLET, DELAYED RELEASE ORAL 2 TIMES DAILY
Qty: 180 TABLET | Refills: 1 | Status: CANCELLED | OUTPATIENT
Start: 2025-04-21

## 2025-04-21 RX ORDER — HYDROXYZINE HYDROCHLORIDE 50 MG/1
50 TABLET, FILM COATED ORAL 2 TIMES DAILY
Qty: 180 TABLET | Refills: 1 | Status: CANCELLED | OUTPATIENT
Start: 2025-04-21

## 2025-04-21 RX ORDER — LEVOTHYROXINE SODIUM 25 UG/1
25 TABLET ORAL DAILY
Qty: 90 TABLET | Refills: 3 | Status: SHIPPED | OUTPATIENT
Start: 2025-04-21

## 2025-04-21 RX ORDER — LORATADINE 10 MG/1
10 TABLET ORAL DAILY
Qty: 90 TABLET | Refills: 3 | Status: SHIPPED | OUTPATIENT
Start: 2025-04-21

## 2025-04-21 RX ORDER — HYDROXYZINE HYDROCHLORIDE 50 MG/1
50 TABLET, FILM COATED ORAL 2 TIMES DAILY
Qty: 180 TABLET | Refills: 1 | Status: SHIPPED | OUTPATIENT
Start: 2025-04-21

## 2025-04-22 RX ORDER — LAMOTRIGINE 200 MG/1
200 TABLET ORAL NIGHTLY
Qty: 30 TABLET | Refills: 3 | Status: SHIPPED | OUTPATIENT
Start: 2025-04-22

## 2025-04-22 RX ORDER — DICYCLOMINE HCL 20 MG
20 TABLET ORAL 4 TIMES DAILY PRN
Qty: 120 TABLET | Refills: 2 | Status: SHIPPED | OUTPATIENT
Start: 2025-04-22

## 2025-04-22 RX ORDER — DESVENLAFAXINE 50 MG/1
TABLET, FILM COATED, EXTENDED RELEASE ORAL
Qty: 30 TABLET | Refills: 2 | Status: SHIPPED | OUTPATIENT
Start: 2025-04-22

## 2025-04-22 RX ORDER — FLUTICASONE PROPIONATE 50 MCG
2 SPRAY, SUSPENSION (ML) NASAL DAILY
Qty: 16 G | Refills: 11 | Status: SHIPPED | OUTPATIENT
Start: 2025-04-22

## 2025-04-23 ENCOUNTER — TELEPHONE (OUTPATIENT)
Dept: ADMINISTRATIVE | Age: 35
End: 2025-04-23

## 2025-04-23 NOTE — TELEPHONE ENCOUNTER
Submitted PA for Desvenlafaxine Succinate ER 50MG er tablets   Via CM (Key: [id number])  STATUS: NOT SENT    Follow up done daily; if no decision with in three days we will refax.  If another three days goes by with no decision will call the insurance for status.

## 2025-04-24 NOTE — TELEPHONE ENCOUNTER
Submitted. (Key: [id number])    The medication is APPROVED THRU 04/24/2026    If this requires a response please respond to the pool ( P [facility] MEDICATION PRE-AUTH).      Thank you please advise patient.

## 2025-04-28 DIAGNOSIS — N39.41 URGENCY INCONTINENCE: ICD-10-CM

## 2025-04-28 RX ORDER — OXYBUTYNIN CHLORIDE 10 MG/1
10 TABLET, EXTENDED RELEASE ORAL 2 TIMES DAILY
Qty: 180 TABLET | Refills: 1 | Status: SHIPPED | OUTPATIENT
Start: 2025-04-28

## 2025-05-18 DIAGNOSIS — G25.81 RESTLESS LEGS SYNDROME (RLS): ICD-10-CM

## 2025-05-19 DIAGNOSIS — F90.2 ATTENTION DEFICIT HYPERACTIVITY DISORDER (ADHD), COMBINED TYPE: ICD-10-CM

## 2025-05-19 RX ORDER — GABAPENTIN 400 MG/1
CAPSULE ORAL
Qty: 30 CAPSULE | Refills: 0 | Status: SHIPPED | OUTPATIENT
Start: 2025-05-19 | End: 2025-08-17

## 2025-05-19 RX ORDER — GUANFACINE 1 MG/1
TABLET, EXTENDED RELEASE ORAL
Qty: 30 TABLET | Refills: 1 | Status: SHIPPED | OUTPATIENT
Start: 2025-05-19

## 2025-06-20 DIAGNOSIS — G25.81 RESTLESS LEGS SYNDROME (RLS): ICD-10-CM

## 2025-06-20 RX ORDER — GABAPENTIN 400 MG/1
CAPSULE ORAL
Qty: 30 CAPSULE | Refills: 0 | Status: SHIPPED | OUTPATIENT
Start: 2025-06-20 | End: 2025-09-18

## 2025-06-20 RX ORDER — GABAPENTIN 400 MG/1
CAPSULE ORAL
Qty: 30 CAPSULE | Refills: 0 | OUTPATIENT
Start: 2025-06-20 | End: 2025-09-18

## 2025-06-27 ENCOUNTER — OFFICE VISIT (OUTPATIENT)
Dept: FAMILY MEDICINE CLINIC | Age: 35
End: 2025-06-27
Payer: COMMERCIAL

## 2025-06-27 VITALS
SYSTOLIC BLOOD PRESSURE: 122 MMHG | WEIGHT: 204 LBS | OXYGEN SATURATION: 98 % | RESPIRATION RATE: 16 BRPM | BODY MASS INDEX: 32.78 KG/M2 | HEIGHT: 66 IN | HEART RATE: 89 BPM | DIASTOLIC BLOOD PRESSURE: 84 MMHG

## 2025-06-27 DIAGNOSIS — F90.2 ATTENTION DEFICIT HYPERACTIVITY DISORDER (ADHD), COMBINED TYPE: Primary | ICD-10-CM

## 2025-06-27 DIAGNOSIS — J45.40 MODERATE PERSISTENT ASTHMA WITHOUT COMPLICATION: ICD-10-CM

## 2025-06-27 PROBLEM — N39.41 URGE INCONTINENCE: Status: RESOLVED | Noted: 2021-12-15 | Resolved: 2025-06-27

## 2025-06-27 PROBLEM — R33.9 URINARY RETENTION: Status: RESOLVED | Noted: 2021-12-15 | Resolved: 2025-06-27

## 2025-06-27 PROCEDURE — 99214 OFFICE O/P EST MOD 30 MIN: CPT | Performed by: FAMILY MEDICINE

## 2025-06-27 RX ORDER — ATOMOXETINE 40 MG/1
40 CAPSULE ORAL DAILY
Qty: 30 CAPSULE | Refills: 3 | Status: SHIPPED | OUTPATIENT
Start: 2025-06-27

## 2025-06-27 RX ORDER — BUDESONIDE AND FORMOTEROL FUMARATE DIHYDRATE 80; 4.5 UG/1; UG/1
2 AEROSOL RESPIRATORY (INHALATION) 2 TIMES DAILY
Qty: 10.2 G | Refills: 3 | Status: SHIPPED | OUTPATIENT
Start: 2025-06-27

## 2025-06-27 NOTE — PATIENT INSTRUCTIONS
INSTRUCTIONS  NEXT APPOINTMENT: Please schedule check-up in 4 months.    PLEASE TAKE THIS FORM TO CHECK-OUT WINDOW TO SCHEDULE NEXT VISIT.   Please get flu vaccine when available in fall.  Can get either at this office or at stores such as [employer].   Start Strattera at 40 mg. Let me know in a few weeks or so if want to double it.  Start preventive asthma inhaler. Check coverage.

## 2025-06-27 NOTE — PROGRESS NOTES
Mood Visit   Assessment and Plan:      Diagnosis Orders   1. Attention deficit hyperactivity disorder (ADHD), combined type  atomoxetine (STRATTERA) 40 MG capsule      2. Moderate persistent asthma without complication  budesonide-formoterol (SYMBICORT) 80-4.5 MCG/ACT AERO      Poor control of both. Plan as above and below.    COUNSELLING  Discussed use, benefit, and side effects of prescribed medications.  Barriers to medication compliance addressed.  All patient questions answered.  Pt voiced understanding.     INSTRUCTIONS  NEXT APPOINTMENT: Please schedule check-up in 4 months.    PLEASE TAKE THIS FORM TO CHECK-OUT WINDOW TO SCHEDULE NEXT VISIT.   Please get flu vaccine when available in fall.  Can get either at this office or at stores such as [employer] and [employer].   Start Strattera at 40 mg. Let me know in a few weeks or so if want to double it.  Start preventive asthma inhaler. Check coverage.     Subjective:     Chief Complaint   Patient presents with    Anxiety     Pt is here for a f/u     Medication Check      Adamaris Irvin is a 34 y.o. female who presents for follow up of mood issue.     Not able to afford advair. Using albuterol once a week but daily now that humid out    CHART REVIEW  Health Maintenance   Topic Date Due    COVID-19 Vaccine (1 - 2024-25 season) Never done    Depression Monitoring  03/03/2026    A1C test (Diabetic or Prediabetic)  03/14/2026    DTaP/Tdap/Td vaccine (9 - Td or Tdap) 08/11/2028    Cervical cancer screen  03/14/2030    Hepatitis B vaccine  Completed    Hib vaccine  Completed    HPV vaccine  Completed    Polio vaccine  Completed    Flu vaccine  Completed    Pneumococcal 0-49 years Vaccine  Completed    Hepatitis C screen  Completed    HIV screen  Completed    Hepatitis A vaccine  Aged Out    Meningococcal (ACWY) vaccine  Aged Out    Meningococcal B vaccine  Aged Out    Varicella vaccine  Discontinued     Prior to Visit Medications    Medication Sig Taking? Authorizing

## 2025-07-03 ENCOUNTER — PATIENT MESSAGE (OUTPATIENT)
Dept: FAMILY MEDICINE CLINIC | Age: 35
End: 2025-07-03

## 2025-07-03 DIAGNOSIS — L24.7 IRRITANT CONTACT DERMATITIS DUE TO PLANTS, EXCEPT FOOD: Primary | ICD-10-CM

## 2025-07-03 RX ORDER — PREDNISONE 10 MG/1
TABLET ORAL
Qty: 39 TABLET | Refills: 0 | Status: SHIPPED | OUTPATIENT
Start: 2025-07-03 | End: 2025-07-03 | Stop reason: SDUPTHER

## 2025-07-03 RX ORDER — PREDNISONE 10 MG/1
TABLET ORAL
Qty: 39 TABLET | Refills: 0 | Status: SHIPPED | OUTPATIENT
Start: 2025-07-03 | End: 2025-07-15

## 2025-07-20 DIAGNOSIS — G25.81 RESTLESS LEGS SYNDROME (RLS): ICD-10-CM

## 2025-07-21 RX ORDER — GABAPENTIN 400 MG/1
CAPSULE ORAL
Qty: 30 CAPSULE | Refills: 0 | Status: SHIPPED | OUTPATIENT
Start: 2025-07-21 | End: 2025-07-23 | Stop reason: SDUPTHER

## 2025-07-23 DIAGNOSIS — J30.2 SEASONAL ALLERGIC RHINITIS, UNSPECIFIED TRIGGER: ICD-10-CM

## 2025-07-23 DIAGNOSIS — G25.81 RESTLESS LEGS SYNDROME (RLS): ICD-10-CM

## 2025-07-23 DIAGNOSIS — E03.9 ACQUIRED HYPOTHYROIDISM: ICD-10-CM

## 2025-07-23 DIAGNOSIS — K21.9 GASTROESOPHAGEAL REFLUX DISEASE WITHOUT ESOPHAGITIS: ICD-10-CM

## 2025-07-25 RX ORDER — LEVOTHYROXINE SODIUM 25 UG/1
25 TABLET ORAL DAILY
Qty: 90 TABLET | Refills: 3 | Status: SHIPPED | OUTPATIENT
Start: 2025-07-25

## 2025-07-25 RX ORDER — DESVENLAFAXINE 50 MG/1
TABLET, FILM COATED, EXTENDED RELEASE ORAL
Qty: 30 TABLET | Refills: 2 | Status: SHIPPED | OUTPATIENT
Start: 2025-07-25

## 2025-07-25 RX ORDER — PANTOPRAZOLE SODIUM 40 MG/1
40 TABLET, DELAYED RELEASE ORAL 2 TIMES DAILY
Qty: 180 TABLET | Refills: 1 | Status: SHIPPED | OUTPATIENT
Start: 2025-07-25

## 2025-07-25 RX ORDER — LORATADINE 10 MG/1
10 TABLET ORAL DAILY
Qty: 90 TABLET | Refills: 3 | Status: SHIPPED | OUTPATIENT
Start: 2025-07-25

## 2025-07-25 RX ORDER — GABAPENTIN 400 MG/1
CAPSULE ORAL
Qty: 30 CAPSULE | Refills: 2 | Status: SHIPPED | OUTPATIENT
Start: 2025-07-25 | End: 2025-10-23

## 2025-07-25 RX ORDER — HYDROXYZINE HYDROCHLORIDE 50 MG/1
50 TABLET, FILM COATED ORAL 2 TIMES DAILY
Qty: 180 TABLET | Refills: 1 | Status: SHIPPED | OUTPATIENT
Start: 2025-07-25

## 2025-08-22 DIAGNOSIS — J30.2 SEASONAL ALLERGIC RHINITIS, UNSPECIFIED TRIGGER: ICD-10-CM

## 2025-08-22 DIAGNOSIS — K21.9 GASTROESOPHAGEAL REFLUX DISEASE WITHOUT ESOPHAGITIS: ICD-10-CM

## 2025-08-22 DIAGNOSIS — G25.81 RESTLESS LEGS SYNDROME (RLS): ICD-10-CM

## 2025-08-22 RX ORDER — HYDROXYZINE HYDROCHLORIDE 50 MG/1
50 TABLET, FILM COATED ORAL 2 TIMES DAILY
Qty: 180 TABLET | Refills: 1 | Status: SHIPPED | OUTPATIENT
Start: 2025-08-22

## 2025-08-22 RX ORDER — LORATADINE 10 MG/1
10 TABLET ORAL DAILY
Qty: 90 TABLET | Refills: 1 | Status: SHIPPED | OUTPATIENT
Start: 2025-08-22

## 2025-08-22 RX ORDER — GABAPENTIN 400 MG/1
CAPSULE ORAL
Qty: 30 CAPSULE | Refills: 2 | Status: SHIPPED | OUTPATIENT
Start: 2025-08-22 | End: 2025-11-20

## 2025-08-22 RX ORDER — PANTOPRAZOLE SODIUM 40 MG/1
40 TABLET, DELAYED RELEASE ORAL 2 TIMES DAILY
Qty: 180 TABLET | Refills: 1 | Status: SHIPPED | OUTPATIENT
Start: 2025-08-22

## 2025-09-05 RX ORDER — LAMOTRIGINE 200 MG/1
200 TABLET ORAL NIGHTLY
Qty: 30 TABLET | Refills: 1 | Status: SHIPPED | OUTPATIENT
Start: 2025-09-05